# Patient Record
Sex: FEMALE | Race: WHITE | Employment: OTHER | URBAN - METROPOLITAN AREA
[De-identification: names, ages, dates, MRNs, and addresses within clinical notes are randomized per-mention and may not be internally consistent; named-entity substitution may affect disease eponyms.]

---

## 2017-02-07 ENCOUNTER — OFFICE VISIT (OUTPATIENT)
Dept: FAMILY MEDICINE CLINIC | Age: 72
End: 2017-02-07

## 2017-02-07 VITALS
RESPIRATION RATE: 12 BRPM | SYSTOLIC BLOOD PRESSURE: 108 MMHG | HEART RATE: 82 BPM | BODY MASS INDEX: 23.39 KG/M2 | DIASTOLIC BLOOD PRESSURE: 66 MMHG | HEIGHT: 64 IN | WEIGHT: 137 LBS

## 2017-02-07 DIAGNOSIS — L02.91 ABSCESS: Primary | ICD-10-CM

## 2017-02-07 PROCEDURE — 99213 OFFICE O/P EST LOW 20 MIN: CPT | Performed by: FAMILY MEDICINE

## 2017-02-07 PROCEDURE — 10060 I&D ABSCESS SIMPLE/SINGLE: CPT | Performed by: FAMILY MEDICINE

## 2017-02-07 RX ORDER — SULFAMETHOXAZOLE AND TRIMETHOPRIM 800; 160 MG/1; MG/1
1 TABLET ORAL 2 TIMES DAILY
Qty: 20 TABLET | Refills: 0 | Status: SHIPPED | OUTPATIENT
Start: 2017-02-07 | End: 2017-02-17

## 2017-03-05 DIAGNOSIS — J40 BRONCHITIS: ICD-10-CM

## 2017-03-05 DIAGNOSIS — R06.02 SOB (SHORTNESS OF BREATH): ICD-10-CM

## 2017-03-05 DIAGNOSIS — J45.901 REACTIVE AIRWAY DISEASE WITH ACUTE EXACERBATION: ICD-10-CM

## 2017-03-06 RX ORDER — DEXAMETHASONE 4 MG/1
TABLET ORAL
Qty: 12 G | Refills: 0 | Status: SHIPPED | OUTPATIENT
Start: 2017-03-06 | End: 2017-03-29 | Stop reason: SDUPTHER

## 2017-03-13 ENCOUNTER — OFFICE VISIT (OUTPATIENT)
Dept: FAMILY MEDICINE CLINIC | Age: 72
End: 2017-03-13

## 2017-03-13 ENCOUNTER — HOSPITAL ENCOUNTER (OUTPATIENT)
Dept: OTHER | Age: 72
Discharge: HOME | End: 2017-03-13
Attending: FAMILY MEDICINE | Admitting: FAMILY MEDICINE

## 2017-03-13 VITALS
HEIGHT: 65 IN | DIASTOLIC BLOOD PRESSURE: 64 MMHG | WEIGHT: 139 LBS | SYSTOLIC BLOOD PRESSURE: 134 MMHG | HEART RATE: 72 BPM | RESPIRATION RATE: 12 BRPM | BODY MASS INDEX: 23.16 KG/M2

## 2017-03-13 DIAGNOSIS — M53.3 COCCYGEAL PAIN: Primary | ICD-10-CM

## 2017-03-13 DIAGNOSIS — M53.3 COCCYGEAL PAIN: ICD-10-CM

## 2017-03-13 PROCEDURE — 99213 OFFICE O/P EST LOW 20 MIN: CPT | Performed by: FAMILY MEDICINE

## 2017-03-21 ENCOUNTER — OFFICE VISIT (OUTPATIENT)
Dept: PULMONOLOGY | Age: 72
End: 2017-03-21

## 2017-03-21 VITALS
SYSTOLIC BLOOD PRESSURE: 120 MMHG | HEART RATE: 79 BPM | DIASTOLIC BLOOD PRESSURE: 70 MMHG | HEIGHT: 64 IN | WEIGHT: 139 LBS | OXYGEN SATURATION: 98 % | TEMPERATURE: 97.4 F | BODY MASS INDEX: 23.73 KG/M2 | RESPIRATION RATE: 16 BRPM

## 2017-03-21 DIAGNOSIS — R06.02 SOB (SHORTNESS OF BREATH): ICD-10-CM

## 2017-03-21 DIAGNOSIS — J45.40 REACTIVE AIRWAY DISEASE, MODERATE PERSISTENT, UNCOMPLICATED: Primary | ICD-10-CM

## 2017-03-21 PROCEDURE — 99214 OFFICE O/P EST MOD 30 MIN: CPT | Performed by: INTERNAL MEDICINE

## 2017-03-21 RX ORDER — MONTELUKAST SODIUM 10 MG/1
10 TABLET ORAL NIGHTLY
Qty: 30 TABLET | Refills: 11 | Status: SHIPPED | OUTPATIENT
Start: 2017-03-21 | End: 2017-09-20 | Stop reason: SDUPTHER

## 2017-03-21 RX ORDER — NAPROXEN SODIUM 220 MG
220 TABLET ORAL 2 TIMES DAILY WITH MEALS
COMMUNITY
End: 2020-06-17 | Stop reason: ALTCHOICE

## 2017-03-21 RX ORDER — FLUTICASONE PROPIONATE 110 UG/1
2 AEROSOL, METERED RESPIRATORY (INHALATION) 2 TIMES DAILY
Qty: 1 INHALER | Refills: 11 | Status: SHIPPED | OUTPATIENT
Start: 2017-03-21 | End: 2018-04-09 | Stop reason: SDUPTHER

## 2017-03-21 ASSESSMENT — ASTHMA QUESTIONNAIRES
QUESTION_2 LAST FOUR WEEKS HOW OFTEN HAVE YOU HAD SHORTNESS OF BREATH: 4
QUESTION_4 LAST FOUR WEEKS HOW OFTEN HAVE YOU USED YOUR RESCUE INHALER OR NEBULIZER MEDICATION (SUCH AS ALBUTEROL): 2
QUESTION_3 LAST FOUR WEEKS HOW OFTEN DID YOUR ASTHMA SYMPTOMS (WHEEZING, COUGHING, SHORTNESS OF BREATH, CHEST TIGHTNESS OR PAIN) WAKE YOU UP AT NIGHT OR EARLIER THAN USUAL IN THE MORNING: 5
QUESTION_5 LAST FOUR WEEKS HOW WOULD YOU RATE YOUR ASTHMA CONTROL: 4
ACT_TOTALSCORE: 20
QUESTION_1 LAST FOUR WEEKS HOW MUCH OF THE TIME DID YOUR ASTHMA KEEP YOU FROM GETTING AS MUCH DONE AT WORK, SCHOOL OR AT HOME: 5

## 2017-03-28 ENCOUNTER — HOSPITAL ENCOUNTER (OUTPATIENT)
Dept: OTHER | Age: 72
Discharge: HOME | End: 2017-03-31
Attending: FAMILY MEDICINE | Admitting: FAMILY MEDICINE

## 2017-03-29 DIAGNOSIS — J45.901 REACTIVE AIRWAY DISEASE WITH ACUTE EXACERBATION: ICD-10-CM

## 2017-03-29 DIAGNOSIS — J40 BRONCHITIS: ICD-10-CM

## 2017-03-29 DIAGNOSIS — R06.02 SOB (SHORTNESS OF BREATH): ICD-10-CM

## 2017-03-30 RX ORDER — DEXAMETHASONE 4 MG/1
TABLET ORAL
Qty: 12 G | Refills: 0 | Status: SHIPPED | OUTPATIENT
Start: 2017-03-30 | End: 2017-04-25 | Stop reason: SDUPTHER

## 2017-04-25 DIAGNOSIS — R06.02 SOB (SHORTNESS OF BREATH): ICD-10-CM

## 2017-04-25 DIAGNOSIS — J40 BRONCHITIS: ICD-10-CM

## 2017-04-25 DIAGNOSIS — J45.901 REACTIVE AIRWAY DISEASE WITH ACUTE EXACERBATION: ICD-10-CM

## 2017-04-25 RX ORDER — DEXAMETHASONE 4 MG/1
TABLET ORAL
Qty: 12 G | Refills: 0 | Status: SHIPPED | OUTPATIENT
Start: 2017-04-25 | End: 2017-09-20 | Stop reason: SDUPTHER

## 2017-07-13 ENCOUNTER — HOSPITAL ENCOUNTER (OUTPATIENT)
Dept: WOMENS IMAGING | Age: 72
Discharge: HOME | End: 2017-07-13
Attending: OBSTETRICS & GYNECOLOGY | Admitting: OBSTETRICS & GYNECOLOGY

## 2017-07-13 DIAGNOSIS — Z12.31 VISIT FOR SCREENING MAMMOGRAM: ICD-10-CM

## 2017-08-22 ENCOUNTER — OFFICE VISIT (OUTPATIENT)
Dept: DERMATOLOGY | Age: 72
End: 2017-08-22

## 2017-08-22 DIAGNOSIS — D22.9 MULTIPLE NEVI: ICD-10-CM

## 2017-08-22 DIAGNOSIS — L73.9 FOLLICULITIS: ICD-10-CM

## 2017-08-22 DIAGNOSIS — L57.0 AK (ACTINIC KERATOSIS): Primary | ICD-10-CM

## 2017-08-22 PROCEDURE — 17000 DESTRUCT PREMALG LESION: CPT | Performed by: DERMATOLOGY

## 2017-08-22 PROCEDURE — 99213 OFFICE O/P EST LOW 20 MIN: CPT | Performed by: DERMATOLOGY

## 2017-08-22 RX ORDER — CLINDAMYCIN PHOSPHATE 11.9 MG/ML
SOLUTION TOPICAL
Qty: 60 ML | Refills: 3 | Status: SHIPPED | OUTPATIENT
Start: 2017-08-22 | End: 2019-10-15 | Stop reason: SDUPTHER

## 2017-09-20 ENCOUNTER — OFFICE VISIT (OUTPATIENT)
Dept: FAMILY MEDICINE CLINIC | Age: 72
End: 2017-09-20

## 2017-09-20 VITALS
BODY MASS INDEX: 23.82 KG/M2 | HEIGHT: 65 IN | WEIGHT: 143 LBS | DIASTOLIC BLOOD PRESSURE: 80 MMHG | HEART RATE: 60 BPM | RESPIRATION RATE: 12 BRPM | SYSTOLIC BLOOD PRESSURE: 120 MMHG

## 2017-09-20 DIAGNOSIS — H35.30 MACULAR DEGENERATION: ICD-10-CM

## 2017-09-20 DIAGNOSIS — J45.20 RAD (REACTIVE AIRWAY DISEASE), MILD INTERMITTENT, UNCOMPLICATED: Primary | ICD-10-CM

## 2017-09-20 DIAGNOSIS — Z23 NEED FOR INFLUENZA VACCINATION: ICD-10-CM

## 2017-09-20 DIAGNOSIS — K21.9 GASTROESOPHAGEAL REFLUX DISEASE WITHOUT ESOPHAGITIS: ICD-10-CM

## 2017-09-20 PROCEDURE — 90662 IIV NO PRSV INCREASED AG IM: CPT | Performed by: FAMILY MEDICINE

## 2017-09-20 PROCEDURE — 99214 OFFICE O/P EST MOD 30 MIN: CPT | Performed by: FAMILY MEDICINE

## 2017-09-20 PROCEDURE — G0008 ADMIN INFLUENZA VIRUS VAC: HCPCS | Performed by: FAMILY MEDICINE

## 2017-09-20 RX ORDER — RANITIDINE 150 MG/1
150 TABLET ORAL 2 TIMES DAILY
Qty: 60 TABLET | Refills: 3 | Status: SHIPPED | OUTPATIENT
Start: 2017-09-20 | End: 2020-06-17

## 2017-09-26 ENCOUNTER — OFFICE VISIT (OUTPATIENT)
Dept: PULMONOLOGY | Age: 72
End: 2017-09-26

## 2017-09-26 VITALS
HEART RATE: 81 BPM | RESPIRATION RATE: 16 BRPM | BODY MASS INDEX: 23.49 KG/M2 | HEIGHT: 65 IN | WEIGHT: 141 LBS | SYSTOLIC BLOOD PRESSURE: 122 MMHG | OXYGEN SATURATION: 96 % | DIASTOLIC BLOOD PRESSURE: 62 MMHG | TEMPERATURE: 97.7 F

## 2017-09-26 DIAGNOSIS — J45.40 REACTIVE AIRWAY DISEASE, MODERATE PERSISTENT, UNCOMPLICATED: Primary | ICD-10-CM

## 2017-09-26 PROCEDURE — 99213 OFFICE O/P EST LOW 20 MIN: CPT | Performed by: INTERNAL MEDICINE

## 2017-09-26 RX ORDER — FLUTICASONE PROPIONATE 110 UG/1
2 AEROSOL, METERED RESPIRATORY (INHALATION) 2 TIMES DAILY
Qty: 1 INHALER | Refills: 11 | Status: SHIPPED | OUTPATIENT
Start: 2017-09-26 | End: 2018-03-21 | Stop reason: SDUPTHER

## 2017-09-26 RX ORDER — ALBUTEROL SULFATE 90 UG/1
2 AEROSOL, METERED RESPIRATORY (INHALATION) EVERY 4 HOURS PRN
Qty: 1 INHALER | Refills: 11 | Status: SHIPPED | OUTPATIENT
Start: 2017-09-26 | End: 2018-10-19 | Stop reason: SDUPTHER

## 2017-09-26 RX ORDER — MONTELUKAST SODIUM 10 MG/1
10 TABLET ORAL NIGHTLY
Qty: 30 TABLET | Refills: 6 | Status: SHIPPED | OUTPATIENT
Start: 2017-09-26 | End: 2018-03-21 | Stop reason: SDUPTHER

## 2017-09-26 ASSESSMENT — ASTHMA QUESTIONNAIRES
QUESTION_3 LAST FOUR WEEKS HOW OFTEN DID YOUR ASTHMA SYMPTOMS (WHEEZING, COUGHING, SHORTNESS OF BREATH, CHEST TIGHTNESS OR PAIN) WAKE YOU UP AT NIGHT OR EARLIER THAN USUAL IN THE MORNING: 5
QUESTION_1 LAST FOUR WEEKS HOW MUCH OF THE TIME DID YOUR ASTHMA KEEP YOU FROM GETTING AS MUCH DONE AT WORK, SCHOOL OR AT HOME: 4
ACT_TOTALSCORE: 19
QUESTION_5 LAST FOUR WEEKS HOW WOULD YOU RATE YOUR ASTHMA CONTROL: 4
QUESTION_4 LAST FOUR WEEKS HOW OFTEN HAVE YOU USED YOUR RESCUE INHALER OR NEBULIZER MEDICATION (SUCH AS ALBUTEROL): 2
QUESTION_2 LAST FOUR WEEKS HOW OFTEN HAVE YOU HAD SHORTNESS OF BREATH: 4

## 2017-12-01 ENCOUNTER — TELEPHONE (OUTPATIENT)
Dept: PULMONOLOGY | Age: 72
End: 2017-12-01

## 2017-12-01 DIAGNOSIS — R05.9 COUGH: Primary | ICD-10-CM

## 2017-12-01 RX ORDER — BENZONATATE 100 MG/1
200 CAPSULE ORAL 3 TIMES DAILY PRN
Qty: 60 CAPSULE | Refills: 2 | Status: SHIPPED | OUTPATIENT
Start: 2017-12-01 | End: 2017-12-08

## 2017-12-01 NOTE — TELEPHONE ENCOUNTER
Pt called c/o productive cough clear but constant, no wheezing, no fevers, just coughing so much causing to hurt in chest area. Would like a cough med called in.

## 2018-03-21 ENCOUNTER — OFFICE VISIT (OUTPATIENT)
Dept: FAMILY MEDICINE CLINIC | Age: 73
End: 2018-03-21

## 2018-03-21 VITALS
DIASTOLIC BLOOD PRESSURE: 71 MMHG | HEART RATE: 80 BPM | WEIGHT: 139.4 LBS | HEIGHT: 65 IN | SYSTOLIC BLOOD PRESSURE: 137 MMHG | RESPIRATION RATE: 16 BRPM | BODY MASS INDEX: 23.22 KG/M2

## 2018-03-21 DIAGNOSIS — Z23 NEED FOR PROPHYLACTIC VACCINATION AND INOCULATION AGAINST VARICELLA: ICD-10-CM

## 2018-03-21 DIAGNOSIS — Z13.220 SCREENING FOR HYPERLIPIDEMIA: ICD-10-CM

## 2018-03-21 DIAGNOSIS — Z13.1 SCREENING FOR DIABETES MELLITUS: ICD-10-CM

## 2018-03-21 DIAGNOSIS — Z00.00 ROUTINE GENERAL MEDICAL EXAMINATION AT A HEALTH CARE FACILITY: Primary | ICD-10-CM

## 2018-03-21 LAB
CHOLESTEROL, TOTAL: 239 MG/DL (ref 0–199)
GLUCOSE BLD-MCNC: 92 MG/DL (ref 70–99)
HDLC SERPL-MCNC: 162 MG/DL (ref 40–60)
LDL CHOLESTEROL CALCULATED: 69 MG/DL
TRIGL SERPL-MCNC: 42 MG/DL (ref 0–150)
VLDLC SERPL CALC-MCNC: 8 MG/DL

## 2018-03-21 PROCEDURE — G0439 PPPS, SUBSEQ VISIT: HCPCS | Performed by: FAMILY MEDICINE

## 2018-03-21 PROCEDURE — 36415 COLL VENOUS BLD VENIPUNCTURE: CPT | Performed by: FAMILY MEDICINE

## 2018-03-21 ASSESSMENT — LIFESTYLE VARIABLES
AUDIT TOTAL SCORE: 1
HOW OFTEN DURING THE LAST YEAR HAVE YOU FOUND THAT YOU WERE NOT ABLE TO STOP DRINKING ONCE YOU HAD STARTED: 0
AUDIT-C TOTAL SCORE: 1
HOW OFTEN DURING THE LAST YEAR HAVE YOU FAILED TO DO WHAT WAS NORMALLY EXPECTED FROM YOU BECAUSE OF DRINKING: 0
HOW OFTEN DO YOU HAVE SIX OR MORE DRINKS ON ONE OCCASION: 0
HAS A RELATIVE, FRIEND, DOCTOR, OR ANOTHER HEALTH PROFESSIONAL EXPRESSED CONCERN ABOUT YOUR DRINKING OR SUGGESTED YOU CUT DOWN: 0
HOW OFTEN DURING THE LAST YEAR HAVE YOU BEEN UNABLE TO REMEMBER WHAT HAPPENED THE NIGHT BEFORE BECAUSE YOU HAD BEEN DRINKING: 0
HOW OFTEN DURING THE LAST YEAR HAVE YOU HAD A FEELING OF GUILT OR REMORSE AFTER DRINKING: 0
HOW OFTEN DO YOU HAVE A DRINK CONTAINING ALCOHOL: 1
HOW OFTEN DURING THE LAST YEAR HAVE YOU NEEDED AN ALCOHOLIC DRINK FIRST THING IN THE MORNING TO GET YOURSELF GOING AFTER A NIGHT OF HEAVY DRINKING: 0
HAVE YOU OR SOMEONE ELSE BEEN INJURED AS A RESULT OF YOUR DRINKING: 0
HOW MANY STANDARD DRINKS CONTAINING ALCOHOL DO YOU HAVE ON A TYPICAL DAY: 0

## 2018-03-21 ASSESSMENT — PATIENT HEALTH QUESTIONNAIRE - PHQ9
SUM OF ALL RESPONSES TO PHQ9 QUESTIONS 1 & 2: 0
SUM OF ALL RESPONSES TO PHQ QUESTIONS 1-9: 0
1. LITTLE INTEREST OR PLEASURE IN DOING THINGS: 0
SUM OF ALL RESPONSES TO PHQ QUESTIONS 1-9: 0
2. FEELING DOWN, DEPRESSED OR HOPELESS: 0

## 2018-03-21 ASSESSMENT — ANXIETY QUESTIONNAIRES: GAD7 TOTAL SCORE: 0

## 2018-03-21 NOTE — PROGRESS NOTES
Provider, MD   polyethyl glycol-propyl glycol 0.4-0.3 % (SYSTANE) 0.4-0.3 % ophthalmic solution 1 drop as needed for Dry Eyes. Yes Historical Provider, MD   Multiple Vitamins-Minerals (CENTRUM SILVER) TABS Take  by mouth daily. Yes Historical Provider, MD   cetirizine (ZYRTEC ALLERGY) 10 MG tablet Take 10 mg by mouth daily. Yes Historical Provider, MD   Calcium Carbonate-Vitamin D (CALTRATE 600+D PO) Take  by mouth. Yes Historical Provider, MD   aspirin 81 MG chewable tablet Take 81 mg by mouth daily. Yes Historical Provider, MD   ranitidine (ZANTAC) 150 MG tablet Take 1 tablet by mouth 2 times daily  Ami Villalobos MD   Spacer/Aero-Holding Chambers (E-Z SPACER) CONCEPCION 1 Device by Does not apply route daily as needed  Martine John DO   Spacer/Aero Chamber Mouthpiece MISC 1 each by Does not apply route once as needed  Magdiel Wheeler MD       Past Medical History:   Diagnosis Date    Actinic keratosis     goes to derm annually    Cataract mild    Diverticulosis     Dry eye     Hyperlipidemia     Macular degeneration     dr Shelley Lew Mitral valve prolapse     diagnosed [facility] early 1990s. asymptomatic    RAD (reactive airway disease)     treated by dr Jose López.     Seasonal allergies      Past Surgical History:   Procedure Laterality Date    APPENDECTOMY  1948    DILATION AND CURETTAGE OF UTERUS      1979    EYE SURGERY      to remove ptyergium    COCO AND BSO  1989    secondary to fibroids    TUBAL LIGATION  1979       Family History   Problem Relation Age of Onset    Cancer Mother 66     lung (was a smoker)    Heart Disease Mother     Diabetes Father     High Blood Pressure Sister     High Blood Pressure Sister     Dementia Sister      PARTIALLY    Diabetes Maternal Aunt     Diabetes Paternal Uncle        CareTeam (Including outside providers/suppliers regularly involved in providing care):   Patient Care Team:  Magdiel Wheeler MD as PCP - General ([facility]) 11/06/2015    Pneumococcal Polysaccharide ([id number]) 11/28/2011    Tdap (Boostrix, Adacel) 07/10/2006, 02/01/2009, 02/26/2009    Typhoid Vaccine, unspecified formulation 06/14/2008    Yellow Fever 08/30/2006    Zoster Live (Zostavax) 09/22/2011        Health Maintenance   Topic Date Due    Shingles Vaccine (1 of 2 - 2 Dose Series) 04/07/1994    DTaP/Tdap/Td vaccine (3 - Td) 02/26/2019    Breast cancer screen  07/13/2019    Lipid screen  05/06/2021    Colon cancer screen colonoscopy  02/08/2022    DEXA (modify frequency per FRAX score)  Completed    Flu vaccine  Completed    Pneumococcal low/med risk  Completed     Recommendations for Preventive Services Due: see orders.   Recommended screening schedule for the next 5-10 years is provided to the patient in written form: see Patient Instructions/AVS.

## 2018-03-26 ENCOUNTER — OFFICE VISIT (OUTPATIENT)
Dept: PULMONOLOGY | Age: 73
End: 2018-03-26

## 2018-03-26 VITALS
BODY MASS INDEX: 23.16 KG/M2 | SYSTOLIC BLOOD PRESSURE: 139 MMHG | OXYGEN SATURATION: 99 % | TEMPERATURE: 96.7 F | HEART RATE: 68 BPM | RESPIRATION RATE: 16 BRPM | DIASTOLIC BLOOD PRESSURE: 80 MMHG | WEIGHT: 139 LBS | HEIGHT: 65 IN

## 2018-03-26 DIAGNOSIS — J45.40 REACTIVE AIRWAY DISEASE, MODERATE PERSISTENT, UNCOMPLICATED: Primary | ICD-10-CM

## 2018-03-26 PROCEDURE — 99213 OFFICE O/P EST LOW 20 MIN: CPT | Performed by: INTERNAL MEDICINE

## 2018-03-26 ASSESSMENT — ASTHMA QUESTIONNAIRES
QUESTION_1 LAST FOUR WEEKS HOW MUCH OF THE TIME DID YOUR ASTHMA KEEP YOU FROM GETTING AS MUCH DONE AT WORK, SCHOOL OR AT HOME: 5
QUESTION_5 LAST FOUR WEEKS HOW WOULD YOU RATE YOUR ASTHMA CONTROL: 5
QUESTION_4 LAST FOUR WEEKS HOW OFTEN HAVE YOU USED YOUR RESCUE INHALER OR NEBULIZER MEDICATION (SUCH AS ALBUTEROL): 2
QUESTION_3 LAST FOUR WEEKS HOW OFTEN DID YOUR ASTHMA SYMPTOMS (WHEEZING, COUGHING, SHORTNESS OF BREATH, CHEST TIGHTNESS OR PAIN) WAKE YOU UP AT NIGHT OR EARLIER THAN USUAL IN THE MORNING: 5
QUESTION_2 LAST FOUR WEEKS HOW OFTEN HAVE YOU HAD SHORTNESS OF BREATH: 4
ACT_TOTALSCORE: 21

## 2018-03-26 NOTE — PROGRESS NOTES
Chief complaint  This is a 67y.o. year old female  who comes to see me with a chief complaint of   Chief Complaint   Patient presents with    Asthma       HPI  Here with cc on follow up on asthma/RADs. Doing well. A family member brought up the possibility she could have CAD when he saw her gasping for air the other day. Boyd Hammer became worried but she felt this was normal for her and normal for her asthma. Still uses albuterol on prn and prior to exercise and uses flovent. No other changes. Has enough refills    Past Medical History:   Diagnosis Date    Actinic keratosis     goes to derm annually    Cataract mild    Diverticulosis     Dry eye     Hyperlipidemia     Macular degeneration     dr Madeline Virk Mitral valve prolapse     diagnosed [facility] early 1990s. asymptomatic    RAD (reactive airway disease)     treated by dr Micah Watkins.     Seasonal allergies        Past Surgical History:   Procedure Laterality Date    APPENDECTOMY  1948    DILATION AND CURETTAGE OF UTERUS      1979    EYE SURGERY      to remove ptyergium    COCO AND BSO  1989    secondary to fibroids    TUBAL LIGATION  1979       Current Outpatient Prescriptions   Medication Sig Dispense Refill    albuterol sulfate  (90 Base) MCG/ACT inhaler Inhale 2 puffs into the lungs every 4 hours as needed for Wheezing or Shortness of Breath (or cough) 1 Inhaler 11    ranitidine (ZANTAC) 150 MG tablet Take 1 tablet by mouth 2 times daily 60 tablet 3    clindamycin (CLEOCIN T) 1 % external solution Apply to affected area BID prn flares [id number], pt prefers this one 60 mL 3    VENTOLIN  (90 BASE) MCG/ACT inhaler INHALE 2 PUFFS BY MOUTH EVERY 4 HOURS AS NEEDED FOR WHEEZING 18 g 0    naproxen sodium (ANAPROX) 220 MG tablet Take 220 mg by mouth 2 times daily (with meals)      montelukast (SINGULAIR) 10 MG tablet TAKE 1 TABLET BY MOUTH EVERY EVENING 30 tablet 4    Psyllium (METAMUCIL PO) Take by mouth     

## 2018-04-09 DIAGNOSIS — J45.40 REACTIVE AIRWAY DISEASE, MODERATE PERSISTENT, UNCOMPLICATED: ICD-10-CM

## 2018-04-09 RX ORDER — DEXAMETHASONE 4 MG/1
TABLET ORAL
Qty: 1 INHALER | Refills: 0 | Status: SHIPPED | OUTPATIENT
Start: 2018-04-09 | End: 2018-05-01 | Stop reason: SDUPTHER

## 2018-04-17 DIAGNOSIS — J45.40 REACTIVE AIRWAY DISEASE, MODERATE PERSISTENT, UNCOMPLICATED: ICD-10-CM

## 2018-04-17 RX ORDER — MONTELUKAST SODIUM 10 MG/1
TABLET ORAL
Qty: 30 TABLET | Refills: 5 | Status: SHIPPED | OUTPATIENT
Start: 2018-04-17 | End: 2018-05-19 | Stop reason: SDUPTHER

## 2018-05-01 DIAGNOSIS — J45.40 REACTIVE AIRWAY DISEASE, MODERATE PERSISTENT, UNCOMPLICATED: ICD-10-CM

## 2018-05-01 DIAGNOSIS — J45.909 REACTIVE AIRWAY DISEASE WITHOUT COMPLICATION, UNSPECIFIED ASTHMA SEVERITY, UNSPECIFIED WHETHER PERSISTENT: ICD-10-CM

## 2018-05-01 RX ORDER — DEXAMETHASONE 4 MG/1
TABLET ORAL
Qty: 12 G | Refills: 0 | Status: SHIPPED | OUTPATIENT
Start: 2018-05-01 | End: 2020-01-30 | Stop reason: SDUPTHER

## 2018-05-19 DIAGNOSIS — J45.40 REACTIVE AIRWAY DISEASE, MODERATE PERSISTENT, UNCOMPLICATED: ICD-10-CM

## 2018-05-21 RX ORDER — MONTELUKAST SODIUM 10 MG/1
10 TABLET ORAL NIGHTLY
Qty: 90 TABLET | Refills: 5 | Status: SHIPPED | OUTPATIENT
Start: 2018-05-21 | End: 2019-03-26 | Stop reason: SDUPTHER

## 2018-07-17 ENCOUNTER — HOSPITAL ENCOUNTER (OUTPATIENT)
Dept: WOMENS IMAGING | Age: 73
Discharge: HOME | End: 2018-07-17
Attending: OBSTETRICS & GYNECOLOGY | Admitting: OBSTETRICS & GYNECOLOGY

## 2018-07-17 DIAGNOSIS — Z12.39 BREAST CANCER SCREENING: ICD-10-CM

## 2018-08-09 ENCOUNTER — TELEPHONE (OUTPATIENT)
Dept: FAMILY MEDICINE CLINIC | Age: 73
End: 2018-08-09

## 2018-08-09 NOTE — TELEPHONE ENCOUNTER
Please call patient back, and let her know she should get vaccine at [facility]. Please document call and then close encounter.   thanks

## 2018-09-25 ENCOUNTER — TELEPHONE (OUTPATIENT)
Dept: FAMILY MEDICINE CLINIC | Age: 73
End: 2018-09-25

## 2018-09-25 ENCOUNTER — OFFICE VISIT (OUTPATIENT)
Dept: PULMONOLOGY | Age: 73
End: 2018-09-25
Payer: COMMERCIAL

## 2018-09-25 VITALS
SYSTOLIC BLOOD PRESSURE: 138 MMHG | RESPIRATION RATE: 16 BRPM | HEIGHT: 65 IN | DIASTOLIC BLOOD PRESSURE: 79 MMHG | OXYGEN SATURATION: 100 % | HEART RATE: 72 BPM | WEIGHT: 141 LBS | TEMPERATURE: 97.5 F | BODY MASS INDEX: 23.49 KG/M2

## 2018-09-25 DIAGNOSIS — R06.09 DOE (DYSPNEA ON EXERTION): Primary | ICD-10-CM

## 2018-09-25 DIAGNOSIS — J45.40 REACTIVE AIRWAY DISEASE, MODERATE PERSISTENT, UNCOMPLICATED: ICD-10-CM

## 2018-09-25 PROCEDURE — 99214 OFFICE O/P EST MOD 30 MIN: CPT | Performed by: INTERNAL MEDICINE

## 2018-09-25 ASSESSMENT — ASTHMA QUESTIONNAIRES
QUESTION_1 LAST FOUR WEEKS HOW MUCH OF THE TIME DID YOUR ASTHMA KEEP YOU FROM GETTING AS MUCH DONE AT WORK, SCHOOL OR AT HOME: 3
QUESTION_5 LAST FOUR WEEKS HOW WOULD YOU RATE YOUR ASTHMA CONTROL: 4
QUESTION_3 LAST FOUR WEEKS HOW OFTEN DID YOUR ASTHMA SYMPTOMS (WHEEZING, COUGHING, SHORTNESS OF BREATH, CHEST TIGHTNESS OR PAIN) WAKE YOU UP AT NIGHT OR EARLIER THAN USUAL IN THE MORNING: 5
QUESTION_4 LAST FOUR WEEKS HOW OFTEN HAVE YOU USED YOUR RESCUE INHALER OR NEBULIZER MEDICATION (SUCH AS ALBUTEROL): 2
QUESTION_2 LAST FOUR WEEKS HOW OFTEN HAVE YOU HAD SHORTNESS OF BREATH: 3
ACT_TOTALSCORE: 17

## 2018-09-25 NOTE — Clinical Note
Dr. Arleth Bello was in today. She more profoundly SOB with any exertion and it seems out of proportion to her lung disease.   I told her to call you about cardiac stress test.  If that is negative she will need cardio-pulmonary stress test at [facility] (I would order that)  Thanks Sacha Zambrano

## 2018-09-25 NOTE — PROGRESS NOTES
Chief complaint  This is a 75y.o. year old female  who comes to see me with a chief complaint of   Chief Complaint   Patient presents with    Asthma       HPI  Here with cc on follow up on asthma/RADs. Not doing well. She is profoundly SOB with any exertional activities. She went to [facility] and had trouble going up any flights of stairs without having to stop and catch her breath. She would stop, improve and then continue on again. She has been using flovent, albuterol and singulair. Did admit to the heat affecting her but did not seem to feel that albuterol would help these events. She was also noticing severe issues breathing when pulling her luggage. There were a lot of nurses with her on the trip and everyone had an opinion about what was wrong    Past Medical History:   Diagnosis Date    Actinic keratosis     goes to derm annually    Cataract mild    Diverticulosis     Dry eye     Hyperlipidemia     Macular degeneration     dr Rachael Abernathy Mitral valve prolapse     diagnosed [facility] early 1990s. asymptomatic    RAD (reactive airway disease)     treated by dr Blake Ferguson.     Seasonal allergies        Past Surgical History:   Procedure Laterality Date    APPENDECTOMY  1948    DILATION AND CURETTAGE OF UTERUS      1979    EYE SURGERY      to remove ptyergium    COCO AND BSO  1989    secondary to fibroids    TUBAL LIGATION  1979       Current Outpatient Prescriptions   Medication Sig Dispense Refill    montelukast (SINGULAIR) 10 MG tablet Take 1 tablet by mouth nightly 90 tablet 5    FLOVENT  MCG/ACT inhaler INHALE 2 PUFFS BY MOUTH TWICE DAILY 12 g 0    albuterol sulfate  (90 Base) MCG/ACT inhaler Inhale 2 puffs into the lungs every 4 hours as needed for Wheezing or Shortness of Breath (or cough) 1 Inhaler 11    ranitidine (ZANTAC) 150 MG tablet Take 1 tablet by mouth 2 times daily 60 tablet 3    clindamycin (CLEOCIN T) 1 % external solution Apply to affected area BID

## 2018-09-25 NOTE — TELEPHONE ENCOUNTER
Inform patient that Dr. Liudmila Uribe does not believe her shortness of breath is due to her lungs. He would like us to get a stress test.  I placed an order. Given number to patient to schedule. Please document call and then close encounter.   thanks

## 2018-10-01 ENCOUNTER — HOSPITAL ENCOUNTER (OUTPATIENT)
Dept: NON INVASIVE DIAGNOSTICS | Age: 73
Discharge: HOME | End: 2018-10-01
Payer: COMMERCIAL

## 2018-10-01 ENCOUNTER — TELEPHONE (OUTPATIENT)
Dept: PULMONOLOGY | Age: 73
End: 2018-10-01

## 2018-10-01 ENCOUNTER — TELEPHONE (OUTPATIENT)
Dept: FAMILY MEDICINE CLINIC | Age: 73
End: 2018-10-01

## 2018-10-01 DIAGNOSIS — R06.09 DOE (DYSPNEA ON EXERTION): ICD-10-CM

## 2018-10-01 LAB
LV EF: 79 %
LVEF MODALITY: NORMAL

## 2018-10-01 PROCEDURE — 78452 HT MUSCLE IMAGE SPECT MULT: CPT

## 2018-10-01 PROCEDURE — 3430000000 HC RX DIAGNOSTIC RADIOPHARMACEUTICAL: Performed by: FAMILY MEDICINE

## 2018-10-01 PROCEDURE — 6360000002 HC RX W HCPCS: Performed by: FAMILY MEDICINE

## 2018-10-01 PROCEDURE — A9502 TC99M TETROFOSMIN: HCPCS | Performed by: FAMILY MEDICINE

## 2018-10-01 PROCEDURE — 93017 CV STRESS TEST TRACING ONLY: CPT

## 2018-10-01 RX ADMIN — REGADENOSON 0.4 MG: 0.08 INJECTION, SOLUTION INTRAVENOUS at 10:53

## 2018-10-01 RX ADMIN — TETROFOSMIN 30 MILLICURIE: 0.23 INJECTION, POWDER, LYOPHILIZED, FOR SOLUTION INTRAVENOUS at 10:50

## 2018-10-01 RX ADMIN — TETROFOSMIN 10 MILLICURIE: 0.23 INJECTION, POWDER, LYOPHILIZED, FOR SOLUTION INTRAVENOUS at 09:35

## 2018-10-02 NOTE — TELEPHONE ENCOUNTER
I need to send her to [facility] for Cardiopulmonary testing . Can you get me a script and place on my desk so I can sign it and we can fax it over?   Thanks

## 2018-10-05 DIAGNOSIS — J45.40 REACTIVE AIRWAY DISEASE, MODERATE PERSISTENT, UNCOMPLICATED: ICD-10-CM

## 2018-10-05 RX ORDER — DEXAMETHASONE 4 MG/1
TABLET ORAL
Qty: 12 G | Refills: 3 | Status: SHIPPED | OUTPATIENT
Start: 2018-10-05 | End: 2019-02-01 | Stop reason: SDUPTHER

## 2018-10-11 ENCOUNTER — TELEPHONE (OUTPATIENT)
Dept: PULMONOLOGY | Age: 73
End: 2018-10-11

## 2018-10-11 ENCOUNTER — OFFICE VISIT (OUTPATIENT)
Dept: DERMATOLOGY | Age: 73
End: 2018-10-11
Payer: COMMERCIAL

## 2018-10-11 DIAGNOSIS — D48.5 NEOPLASM OF UNCERTAIN BEHAVIOR OF SKIN: ICD-10-CM

## 2018-10-11 DIAGNOSIS — L57.0 AK (ACTINIC KERATOSIS): ICD-10-CM

## 2018-10-11 DIAGNOSIS — D22.9 MULTIPLE NEVI: Primary | ICD-10-CM

## 2018-10-11 DIAGNOSIS — L73.9 FOLLICULITIS: ICD-10-CM

## 2018-10-11 PROCEDURE — 99213 OFFICE O/P EST LOW 20 MIN: CPT | Performed by: DERMATOLOGY

## 2018-10-11 PROCEDURE — 17000 DESTRUCT PREMALG LESION: CPT | Performed by: DERMATOLOGY

## 2018-10-11 PROCEDURE — 17003 DESTRUCT PREMALG LES 2-14: CPT | Performed by: DERMATOLOGY

## 2018-10-11 PROCEDURE — 11100 PR BIOPSY OF SKIN LESION: CPT | Performed by: DERMATOLOGY

## 2018-10-11 NOTE — PROGRESS NOTES
[facility]  Tyler Patiño MD  [phone]      Ro Hammond  1945    75 y.o. female     Date of Visit: 10/11/2018    Chief Complaint: moles, f/u AK's  Chief Complaint   Patient presents with    Skin Exam     TBSE  No new concerns     Last seen: 8-2017  she previously had seen Dr. Patricia Lyn and Dr. Asad Carrillo at [facility]    History of Present Illness:    1. Here for evaluation of multiple asx pigmented lesions on the trunk and extremities, present for many years; no change in size/shape/color of any lesions; no bleeding lesions. 2. She has a history of AK's. She has a new rough lesion on the FH. Asx. She has had previous lesions treated with efudex and cryotherapy. 3. F/u scalp folliculitis  - intermittent itchy lesions on the scalp (going on for several years) -Theramycin (erythromycin) helped some in the past and she has used topical clinda more recently. No triggers noted. Well controlled with use of clinda. 4. She has a persistent pink lesion on the L nasal tip x several mos. Asx. Review of Systems:  Gen: Feels well, good sense of health. Skin: No changing moles or lesions. Past Medical History, Family History, Surgical History, Medications and Allergies reviewed. Past Medical History:   Diagnosis Date    Actinic keratosis     goes to derm annually    Cataract mild    Diverticulosis     Dry eye     Hyperlipidemia     Macular degeneration     dr Rick Maravilla Mitral valve prolapse     diagnosed [facility] early 1990s. asymptomatic    RAD (reactive airway disease)     treated by dr Neisha Mari.     Seasonal allergies        Past Surgical History:   Procedure Laterality Date    APPENDECTOMY  1948    DILATION AND CURETTAGE OF UTERUS      1979    EYE SURGERY      to remove ptyergium    COCO AND BSO  1989    secondary to fibroids    TUBAL LIGATION  1979       Outpatient Prescriptions Marked as Taking for the 10/11/18 encounter (Office Visit) with Josie Norton

## 2018-10-15 LAB — DERMATOLOGY PATHOLOGY REPORT: NORMAL

## 2018-10-19 DIAGNOSIS — J45.40 REACTIVE AIRWAY DISEASE, MODERATE PERSISTENT, UNCOMPLICATED: ICD-10-CM

## 2018-11-20 DIAGNOSIS — J45.40 REACTIVE AIRWAY DISEASE, MODERATE PERSISTENT, UNCOMPLICATED: ICD-10-CM

## 2018-11-20 RX ORDER — MONTELUKAST SODIUM 10 MG/1
TABLET ORAL
Qty: 90 TABLET | Refills: 5 | Status: SHIPPED | OUTPATIENT
Start: 2018-11-20 | End: 2020-02-03

## 2019-02-01 DIAGNOSIS — J45.40 REACTIVE AIRWAY DISEASE, MODERATE PERSISTENT, UNCOMPLICATED: ICD-10-CM

## 2019-02-04 RX ORDER — DEXAMETHASONE 4 MG/1
TABLET ORAL
Qty: 1 INHALER | Refills: 3 | Status: SHIPPED | OUTPATIENT
Start: 2019-02-04 | End: 2019-06-05 | Stop reason: SDUPTHER

## 2019-03-26 ENCOUNTER — OFFICE VISIT (OUTPATIENT)
Dept: PULMONOLOGY | Age: 74
End: 2019-03-26
Payer: COMMERCIAL

## 2019-03-26 VITALS
HEART RATE: 71 BPM | BODY MASS INDEX: 23.82 KG/M2 | HEIGHT: 65 IN | OXYGEN SATURATION: 100 % | TEMPERATURE: 97 F | SYSTOLIC BLOOD PRESSURE: 124 MMHG | DIASTOLIC BLOOD PRESSURE: 79 MMHG | WEIGHT: 143 LBS | RESPIRATION RATE: 16 BRPM

## 2019-03-26 DIAGNOSIS — J45.40 REACTIVE AIRWAY DISEASE, MODERATE PERSISTENT, UNCOMPLICATED: ICD-10-CM

## 2019-03-26 DIAGNOSIS — R06.09 DOE (DYSPNEA ON EXERTION): Primary | ICD-10-CM

## 2019-03-26 PROCEDURE — 99213 OFFICE O/P EST LOW 20 MIN: CPT | Performed by: INTERNAL MEDICINE

## 2019-03-26 ASSESSMENT — ASTHMA QUESTIONNAIRES
QUESTION_4 LAST FOUR WEEKS HOW OFTEN HAVE YOU USED YOUR RESCUE INHALER OR NEBULIZER MEDICATION (SUCH AS ALBUTEROL): 5
QUESTION_2 LAST FOUR WEEKS HOW OFTEN HAVE YOU HAD SHORTNESS OF BREATH: 5
ACT_TOTALSCORE: 25
QUESTION_3 LAST FOUR WEEKS HOW OFTEN DID YOUR ASTHMA SYMPTOMS (WHEEZING, COUGHING, SHORTNESS OF BREATH, CHEST TIGHTNESS OR PAIN) WAKE YOU UP AT NIGHT OR EARLIER THAN USUAL IN THE MORNING: 5
QUESTION_5 LAST FOUR WEEKS HOW WOULD YOU RATE YOUR ASTHMA CONTROL: 5
QUESTION_1 LAST FOUR WEEKS HOW MUCH OF THE TIME DID YOUR ASTHMA KEEP YOU FROM GETTING AS MUCH DONE AT WORK, SCHOOL OR AT HOME: 5

## 2019-04-01 ENCOUNTER — OFFICE VISIT (OUTPATIENT)
Dept: FAMILY MEDICINE CLINIC | Age: 74
End: 2019-04-01
Payer: COMMERCIAL

## 2019-04-01 VITALS
BODY MASS INDEX: 23.66 KG/M2 | SYSTOLIC BLOOD PRESSURE: 119 MMHG | WEIGHT: 142 LBS | DIASTOLIC BLOOD PRESSURE: 70 MMHG | HEART RATE: 83 BPM | HEIGHT: 65 IN

## 2019-04-01 DIAGNOSIS — Z00.00 ROUTINE GENERAL MEDICAL EXAMINATION AT A HEALTH CARE FACILITY: Primary | ICD-10-CM

## 2019-04-01 PROCEDURE — G0439 PPPS, SUBSEQ VISIT: HCPCS | Performed by: FAMILY MEDICINE

## 2019-04-01 ASSESSMENT — ANXIETY QUESTIONNAIRES: GAD7 TOTAL SCORE: 6

## 2019-04-01 ASSESSMENT — LIFESTYLE VARIABLES
HOW OFTEN DURING THE LAST YEAR HAVE YOU HAD A FEELING OF GUILT OR REMORSE AFTER DRINKING: 0
HAVE YOU OR SOMEONE ELSE BEEN INJURED AS A RESULT OF YOUR DRINKING: 0
HOW OFTEN DURING THE LAST YEAR HAVE YOU NEEDED AN ALCOHOLIC DRINK FIRST THING IN THE MORNING TO GET YOURSELF GOING AFTER A NIGHT OF HEAVY DRINKING: 0
AUDIT TOTAL SCORE: 1
HOW MANY STANDARD DRINKS CONTAINING ALCOHOL DO YOU HAVE ON A TYPICAL DAY: 0
AUDIT-C TOTAL SCORE: 1
HOW OFTEN DURING THE LAST YEAR HAVE YOU BEEN UNABLE TO REMEMBER WHAT HAPPENED THE NIGHT BEFORE BECAUSE YOU HAD BEEN DRINKING: 0
HOW OFTEN DURING THE LAST YEAR HAVE YOU FOUND THAT YOU WERE NOT ABLE TO STOP DRINKING ONCE YOU HAD STARTED: 0
HOW OFTEN DURING THE LAST YEAR HAVE YOU FAILED TO DO WHAT WAS NORMALLY EXPECTED FROM YOU BECAUSE OF DRINKING: 0
HAS A RELATIVE, FRIEND, DOCTOR, OR ANOTHER HEALTH PROFESSIONAL EXPRESSED CONCERN ABOUT YOUR DRINKING OR SUGGESTED YOU CUT DOWN: 0
HOW OFTEN DO YOU HAVE A DRINK CONTAINING ALCOHOL: 1
HOW OFTEN DO YOU HAVE SIX OR MORE DRINKS ON ONE OCCASION: 0

## 2019-04-01 ASSESSMENT — PATIENT HEALTH QUESTIONNAIRE - PHQ9
SUM OF ALL RESPONSES TO PHQ QUESTIONS 1-9: 0
SUM OF ALL RESPONSES TO PHQ QUESTIONS 1-9: 0

## 2019-04-01 NOTE — PROGRESS NOTES
Medicare Annual Wellness Visit  Name: Roslyn Vazquez Date: 2019   MRN: [id number] Sex: Female   Age: 75 y.o. Ethnicity: Non-/Non    : 1945 Race: Nicko Bush is here for Medicare AWV    Screenings for behavioral, psychosocial and functional/safety risks, and cognitive dysfunction are all negative except as indicated below. These results, as well as other patient data from the [address] [facility] form, are documented in Flowsheets linked to this Encounter. No Known Allergies  Prior to Visit Medications    Medication Sig Taking?  Authorizing Provider   [address] [facility]  MCG/ACT inhaler INHALE 2 PUFFS BY MOUTH INTO THE LUNGS TWICE DAILY Yes Genene Riedel, DO   montelukast (SINGULAIR) 10 MG tablet TAKE 1 TABLET BY MOUTH EVERY NIGHT Yes Genene Riedel, DO   FLOVENT  MCG/ACT inhaler INHALE 2 PUFFS BY MOUTH TWICE DAILY Yes Genene Riedel, DO   Spacer/Aero Chamber Mouthpiece [facility] 1 each by Does not apply route once as needed (sob) Yes Genene Riedel, DO   ranitidine (ZANTAC) 150 MG tablet Take 1 tablet by mouth 2 times daily Yes Ami Craven MD   clindamycin (CLEOCIN T) 1 % external solution Apply to affected area BID prn flares [id number], pt prefers this one Yes Mike Nettles MD   VENTOLIN  (90 BASE) MCG/ACT inhaler INHALE 2 PUFFS BY MOUTH EVERY 4 HOURS AS NEEDED FOR WHEEZING Yes Genene Riedel, DO   naproxen sodium (ANAPROX) 220 MG tablet Take 220 mg by mouth 2 times daily (with meals) Yes Historical Provider, MD   Psyllium (METAMUCIL PO) Take by mouth Yes Historical Provider, MD   Spacer/Aero-Holding Chambers (E-Z SPACER) CONCEPCION 1 Device by Does not apply route daily as needed Yes Genene Riedel, DO   Omega-3 Fatty Acids (FISH OIL PO) Take by mouth Yes Historical Provider, MD   Multiple Vitamins-Minerals (PRESERVISION AREDS 2) CAPS Take by mouth Yes Historical Provider, MD   Vitamin D (CHOLECALCIFEROL) 1000 UNITS CAPS capsule Take 1,000 Units by mouth daily Yes Historical Provider, MD   polyethyl glycol-propyl glycol 0.4-0.3 % (SYSTANE) 0.4-0.3 % ophthalmic solution 1 drop as needed for Dry Eyes. Yes Historical Provider, MD   Multiple Vitamins-Minerals (CENTRUM SILVER) TABS Take  by mouth daily. Yes Historical Provider, MD   cetirizine (ZYRTEC ALLERGY) 10 MG tablet Take 10 mg by mouth daily. Yes Historical Provider, MD   Calcium Carbonate-Vitamin D (CALTRATE 600+D PO) Take  by mouth. Yes Historical Provider, MD   aspirin 81 MG chewable tablet Take 81 mg by mouth daily. Yes Historical Provider, MD     Past Medical History:   Diagnosis Date    Actinic keratosis     goes to derm annually    Cataract mild    Diverticulosis     Dry eye     Hyperlipidemia     Macular degeneration     dr Reid Rosas Mitral valve prolapse     diagnosed [facility] early 1990s. asymptomatic    RAD (reactive airway disease)     treated by dr Angle Quintero.     Seasonal allergies      Past Surgical History:   Procedure Laterality Date    APPENDECTOMY  1948    DILATION AND CURETTAGE OF UTERUS      1979    EYE SURGERY      to remove ptyergium    COCO AND BSO  1989    secondary to fibroids    TUBAL LIGATION  1979     Family History   Problem Relation Age of Onset    Cancer Mother 66        lung (was a smoker)    Heart Disease Mother     Diabetes Father     High Blood Pressure Sister     High Blood Pressure Sister     Dementia Sister         PARTIALLY    Diabetes Maternal Aunt     Diabetes Paternal Uncle        CareTeam (Including outside providers/suppliers regularly involved in providing care):   Patient Care Team:  Jose Armando Faith MD as PCP - General (Family Medicine)  Jose Armando Faith MD as PCP - S Attributed Provider  Eduardo Asher MD as Consulting Physician (Obstetrics & Gynecology)  Namrata Baker MD as Consulting Physician (Ophthalmology)  Corinna Centeno MD as

## 2019-04-01 NOTE — PATIENT INSTRUCTIONS
Personalized Preventive Plan for [facility] - 4/1/2019  Medicare offers a range of preventive health benefits. Some of the tests and screenings are paid in full while other may be subject to a deductible, co-insurance, and/or copay. Some of these benefits include a comprehensive review of your medical history including lifestyle, illnesses that may run in your family, and various assessments and screenings as appropriate. After reviewing your medical record and screening and assessments performed today your provider may have ordered immunizations, labs, imaging, and/or referrals for you. A list of these orders (if applicable) as well as your Preventive Care list are included within your After Visit Summary for your review. Other Preventive Recommendations:    · A preventive eye exam performed by an eye specialist is recommended every 1-2 years to screen for glaucoma; cataracts, macular degeneration, and other eye disorders. · A preventive dental visit is recommended every 6 months. · Try to get at least 150 minutes of exercise per week or 10,000 steps per day on a pedometer . · Order or download the FREE \"Exercise & Physical Activity: Your Everyday Guide\" from [employer]. Call 7-870.311.6869 or search [employer] online. · You need 1956-6875 mg of calcium and 5168-2281 IU of vitamin D per day. It is possible to meet your calcium requirement with diet alone, but a vitamin D supplement is usually necessary to meet this goal.  · When exposed to the sun, use a sunscreen that protects against both UVA and UVB radiation with an SPF of 30 or greater. Reapply every 2 to 3 hours or after sweating, drying off with a towel, or swimming. · Always wear a seat belt when traveling in a car. Always wear a helmet when riding a bicycle or motorcycle.

## 2019-06-05 ENCOUNTER — OFFICE VISIT (OUTPATIENT)
Dept: FAMILY MEDICINE CLINIC | Age: 74
End: 2019-06-05
Payer: COMMERCIAL

## 2019-06-05 VITALS
SYSTOLIC BLOOD PRESSURE: 123 MMHG | HEIGHT: 65 IN | BODY MASS INDEX: 23.59 KG/M2 | OXYGEN SATURATION: 96 % | HEART RATE: 71 BPM | TEMPERATURE: 97.6 F | DIASTOLIC BLOOD PRESSURE: 72 MMHG | RESPIRATION RATE: 18 BRPM | WEIGHT: 141.6 LBS

## 2019-06-05 DIAGNOSIS — J02.9 SORE THROAT: Primary | ICD-10-CM

## 2019-06-05 DIAGNOSIS — R49.0 HOARSENESS: ICD-10-CM

## 2019-06-05 PROCEDURE — 99213 OFFICE O/P EST LOW 20 MIN: CPT | Performed by: FAMILY MEDICINE

## 2019-06-05 NOTE — PROGRESS NOTES
PROGRESS NOTE     Froylan Sapp MD  [facility] [address] [place] [address]  [phone] office  [phone] fax    Date of Service:  6/5/2019    Subjective:      Patient ID: Susan Doyle is a 75 y.o. female      CC: sore throat, hoarsness    HPI    78-year-old white female here with 2 weeks of pain in her throat, pills getting stuck in her upper throat with swallowing, and intermittent hoarseness. She states talking and swallowing does not cause her throat hurt. She denies any rhinitis, nasal congestion, postnasal drip, cough, fever, chest pain, or shortness of breath. She is on Flovent for reactive airway disease, and is concerned that she may have thrush. She does rinse her mouth out after using Flovent every time. Vitals:    06/05/19 1258   BP: 123/72   Pulse: 71   Resp: 18   Temp: 97.6 °F (36.4 °C)   SpO2: 96%   Weight: 141 lb 9.6 oz (64.2 kg)   Height: 5' 4.5\" (1.638 m)       Outpatient Medications Marked as Taking for the 6/5/19 encounter (Office Visit) with Natacha Lisa MD   Medication Sig Dispense Refill    montelukast (SINGULAIR) 10 MG tablet TAKE 1 TABLET BY MOUTH EVERY NIGHT 90 tablet 5    FLOVENT  MCG/ACT inhaler INHALE 2 PUFFS BY MOUTH TWICE DAILY 12 g 0    ranitidine (ZANTAC) 150 MG tablet Take 1 tablet by mouth 2 times daily 60 tablet 3    naproxen sodium (ANAPROX) 220 MG tablet Take 220 mg by mouth 2 times daily (with meals)      Psyllium (METAMUCIL PO) Take by mouth      Spacer/Aero-Holding Chambers (E-Z SPACER) CONCEPCION 1 Device by Does not apply route daily as needed 1 Device 0    Omega-3 Fatty Acids (FISH OIL PO) Take by mouth      Multiple Vitamins-Minerals (PRESERVISION AREDS 2) CAPS Take by mouth      Vitamin D (CHOLECALCIFEROL) 1000 UNITS CAPS capsule Take 1,000 Units by mouth daily      polyethyl glycol-propyl glycol 0.4-0.3 % (SYSTANE) 0.4-0.3 % ophthalmic solution 1 drop as needed for Dry Eyes.       Multiple Vitamins-Minerals (CENTRUM SILVER) TABS Take  by mouth daily.  cetirizine (ZYRTEC ALLERGY) 10 MG tablet Take 10 mg by mouth daily.  Calcium Carbonate-Vitamin D (CALTRATE 600+D PO) Take  by mouth.  aspirin 81 MG chewable tablet Take 81 mg by mouth daily. Past Medical History:   Diagnosis Date    Actinic keratosis     goes to derm annually    Cataract mild    Diverticulosis     Dry eye     Hyperlipidemia     Macular degeneration     dr Nancy Sidhu Mitral valve prolapse     diagnosed [facility] early 1990s. asymptomatic    RAD (reactive airway disease)     treated by dr Blanche John.  Seasonal allergies        Past Surgical History:   Procedure Laterality Date    APPENDECTOMY  1948    DILATION AND CURETTAGE OF UTERUS      1979    EYE SURGERY      to remove ptyergium    COCO AND BSO  1989    secondary to fibroids    TUBAL LIGATION  1979       Social History     Tobacco Use    Smoking status: Never Smoker    Smokeless tobacco: Never Used   Substance Use Topics    Alcohol use:  Yes     Alcohol/week: 0.0 oz     Comment: rarely       Family History   Problem Relation Age of Onset    Cancer Mother 66        lung (was a smoker)    Heart Disease Mother     Diabetes Father     High Blood Pressure Sister     High Blood Pressure Sister     Dementia Sister         PARTIALLY    Diabetes Maternal Aunt     Diabetes Paternal Uncle            Review of Systems  See hpi      Objective:   Constitutional:   · Reviewed vitals above  · Well Nourished, well developed, no distress       HENT:  · Normal external nose without lesions  · Bilateral TMs translucent with normal light reflex and bony landmarks  · Normal oropharynx without erythema or exudate  · Normal nasal mucosa without swelling or erythema  · No pain with palpation of sinuses  Neck:  · Symmetric and without masses  · No thyromegaly  Resp:  · Normal effort  · Clear to auscultation bilaterally without rhonchi, wheezing or

## 2019-06-10 DIAGNOSIS — J45.40 REACTIVE AIRWAY DISEASE, MODERATE PERSISTENT, UNCOMPLICATED: ICD-10-CM

## 2019-06-10 RX ORDER — FLUTICASONE PROPIONATE 110 UG/1
2 AEROSOL, METERED RESPIRATORY (INHALATION) 2 TIMES DAILY
Qty: 1 INHALER | Refills: 5 | Status: SHIPPED | OUTPATIENT
Start: 2019-06-10 | End: 2019-12-05 | Stop reason: SDUPTHER

## 2019-07-18 ENCOUNTER — HOSPITAL ENCOUNTER (OUTPATIENT)
Dept: WOMENS IMAGING | Age: 74
Discharge: HOME | End: 2019-07-18
Payer: COMMERCIAL

## 2019-07-18 DIAGNOSIS — Z12.31 VISIT FOR SCREENING MAMMOGRAM: ICD-10-CM

## 2019-07-18 PROCEDURE — 77063 BREAST TOMOSYNTHESIS BI: CPT

## 2019-09-24 ENCOUNTER — OFFICE VISIT (OUTPATIENT)
Dept: PULMONOLOGY | Age: 74
End: 2019-09-24
Payer: COMMERCIAL

## 2019-09-24 VITALS
WEIGHT: 145 LBS | HEART RATE: 92 BPM | DIASTOLIC BLOOD PRESSURE: 78 MMHG | RESPIRATION RATE: 16 BRPM | HEIGHT: 65 IN | TEMPERATURE: 97.1 F | BODY MASS INDEX: 24.16 KG/M2 | OXYGEN SATURATION: 99 % | SYSTOLIC BLOOD PRESSURE: 129 MMHG

## 2019-09-24 DIAGNOSIS — J30.89 SEASONAL ALLERGIC RHINITIS DUE TO OTHER ALLERGIC TRIGGER: ICD-10-CM

## 2019-09-24 DIAGNOSIS — R06.09 DOE (DYSPNEA ON EXERTION): ICD-10-CM

## 2019-09-24 DIAGNOSIS — J45.40 REACTIVE AIRWAY DISEASE, MODERATE PERSISTENT, UNCOMPLICATED: Primary | ICD-10-CM

## 2019-09-24 PROCEDURE — 99213 OFFICE O/P EST LOW 20 MIN: CPT | Performed by: INTERNAL MEDICINE

## 2019-09-24 RX ORDER — ALBUTEROL SULFATE 90 UG/1
2 AEROSOL, METERED RESPIRATORY (INHALATION) EVERY 4 HOURS PRN
Qty: 1 INHALER | Refills: 4 | Status: SHIPPED | OUTPATIENT
Start: 2019-09-24 | End: 2022-10-11

## 2019-09-24 ASSESSMENT — ASTHMA QUESTIONNAIRES
QUESTION_2 LAST FOUR WEEKS HOW OFTEN HAVE YOU HAD SHORTNESS OF BREATH: 4
QUESTION_5 LAST FOUR WEEKS HOW WOULD YOU RATE YOUR ASTHMA CONTROL: 5
QUESTION_3 LAST FOUR WEEKS HOW OFTEN DID YOUR ASTHMA SYMPTOMS (WHEEZING, COUGHING, SHORTNESS OF BREATH, CHEST TIGHTNESS OR PAIN) WAKE YOU UP AT NIGHT OR EARLIER THAN USUAL IN THE MORNING: 5
QUESTION_1 LAST FOUR WEEKS HOW MUCH OF THE TIME DID YOUR ASTHMA KEEP YOU FROM GETTING AS MUCH DONE AT WORK, SCHOOL OR AT HOME: 5
ACT_TOTALSCORE: 24
QUESTION_4 LAST FOUR WEEKS HOW OFTEN HAVE YOU USED YOUR RESCUE INHALER OR NEBULIZER MEDICATION (SUCH AS ALBUTEROL): 5

## 2019-09-24 NOTE — PROGRESS NOTES
Chief complaint  This is a 75y.o. year old female  who comes to see me with a chief complaint of   Chief Complaint   Patient presents with    Asthma       HPI  Here with cc on follow up on asthma/RADs. She is doing well. Remains on flovent bid with prn albuterol. Has back off a little on albuterol and still doing well. Having a lot of runny nose lately. Has a lot of allergens in backyard. Has not tried any intra-nasal medications and wanted to make sure she can take an intra-nasal steroid while on inhaled steroid. NO other issues. Still on singulair at night    Past Medical History:   Diagnosis Date    Actinic keratosis     goes to derm annually    Cataract mild    Diverticulosis     Dry eye     Hyperlipidemia     Macular degeneration     dr Ary Bradley Mitral valve prolapse     diagnosed [facility] early 1990s. asymptomatic    RAD (reactive airway disease)     treated by dr Maksim Velasco.     Seasonal allergies        Past Surgical History:   Procedure Laterality Date    APPENDECTOMY  1948    DILATION AND CURETTAGE OF UTERUS      1979    EYE SURGERY      to remove ptyergium    COCO AND BSO  1989    secondary to fibroids    TUBAL LIGATION  1979       Current Outpatient Medications   Medication Sig Dispense Refill    fluticasone (FLOVENT HFA) 110 MCG/ACT inhaler Inhale 2 puffs into the lungs 2 times daily 1 Inhaler 5    montelukast (SINGULAIR) 10 MG tablet TAKE 1 TABLET BY MOUTH EVERY NIGHT 90 tablet 5    FLOVENT  MCG/ACT inhaler INHALE 2 PUFFS BY MOUTH TWICE DAILY 12 g 0    ranitidine (ZANTAC) 150 MG tablet Take 1 tablet by mouth 2 times daily 60 tablet 3    clindamycin (CLEOCIN T) 1 % external solution Apply to affected area BID prn flares [id number], pt prefers this one 60 mL 3    VENTOLIN  (90 BASE) MCG/ACT inhaler INHALE 2 PUFFS BY MOUTH EVERY 4 HOURS AS NEEDED FOR WHEEZING 18 g 0    naproxen sodium (ANAPROX) 220 MG tablet Take 220 mg by mouth 2 times daily (with

## 2019-09-24 NOTE — PATIENT INSTRUCTIONS
Continue with flovent    Use albuterol as needed    Continue with singulair    Ok to use over the counter steroids sprays     Follow up in 6 month

## 2019-10-15 ENCOUNTER — OFFICE VISIT (OUTPATIENT)
Dept: DERMATOLOGY | Age: 74
End: 2019-10-15
Payer: COMMERCIAL

## 2019-10-15 DIAGNOSIS — L57.0 AK (ACTINIC KERATOSIS): ICD-10-CM

## 2019-10-15 DIAGNOSIS — D22.9 MULTIPLE NEVI: Primary | ICD-10-CM

## 2019-10-15 DIAGNOSIS — D48.5 NEOPLASM OF UNCERTAIN BEHAVIOR OF SKIN: ICD-10-CM

## 2019-10-15 DIAGNOSIS — L73.9 FOLLICULITIS: ICD-10-CM

## 2019-10-15 PROCEDURE — 17000 DESTRUCT PREMALG LESION: CPT | Performed by: DERMATOLOGY

## 2019-10-15 PROCEDURE — 11103 TANGNTL BX SKIN EA SEP/ADDL: CPT | Performed by: DERMATOLOGY

## 2019-10-15 PROCEDURE — 17003 DESTRUCT PREMALG LES 2-14: CPT | Performed by: DERMATOLOGY

## 2019-10-15 PROCEDURE — 99213 OFFICE O/P EST LOW 20 MIN: CPT | Performed by: DERMATOLOGY

## 2019-10-15 PROCEDURE — 11102 TANGNTL BX SKIN SINGLE LES: CPT | Performed by: DERMATOLOGY

## 2019-10-16 RX ORDER — CLINDAMYCIN PHOSPHATE 11.9 MG/ML
SOLUTION TOPICAL
Qty: 60 ML | Refills: 3 | Status: SHIPPED | OUTPATIENT
Start: 2019-10-16 | End: 2020-10-20 | Stop reason: SDUPTHER

## 2019-10-17 LAB — DERMATOLOGY PATHOLOGY REPORT: NORMAL

## 2019-10-29 RX ORDER — FLUOROURACIL 50 MG/G
CREAM TOPICAL
Qty: 40 G | Refills: 0 | Status: SHIPPED | OUTPATIENT
Start: 2019-10-29 | End: 2020-06-17 | Stop reason: ALTCHOICE

## 2019-10-30 ENCOUNTER — TELEPHONE (OUTPATIENT)
Dept: DERMATOLOGY | Age: 74
End: 2019-10-30

## 2019-11-25 ENCOUNTER — TELEPHONE (OUTPATIENT)
Dept: DERMATOLOGY | Age: 74
End: 2019-11-25

## 2019-12-05 DIAGNOSIS — J45.40 REACTIVE AIRWAY DISEASE, MODERATE PERSISTENT, UNCOMPLICATED: ICD-10-CM

## 2019-12-05 RX ORDER — DEXAMETHASONE 4 MG/1
TABLET ORAL
Qty: 12 G | Refills: 0 | Status: SHIPPED | OUTPATIENT
Start: 2019-12-05 | End: 2020-01-02

## 2020-01-02 RX ORDER — DEXAMETHASONE 4 MG/1
TABLET ORAL
Qty: 12 G | Refills: 0 | Status: SHIPPED | OUTPATIENT
Start: 2020-01-02 | End: 2020-01-16

## 2020-01-16 ENCOUNTER — OFFICE VISIT (OUTPATIENT)
Dept: DERMATOLOGY | Age: 75
End: 2020-01-16
Payer: COMMERCIAL

## 2020-01-16 PROCEDURE — 17000 DESTRUCT PREMALG LESION: CPT | Performed by: DERMATOLOGY

## 2020-01-16 PROCEDURE — 99212 OFFICE O/P EST SF 10 MIN: CPT | Performed by: DERMATOLOGY

## 2020-01-16 NOTE — PROGRESS NOTES
[facility]  Laurita Dowd MD  [phone]      Loretta Galindo  1945    75 y.o. female     Date of Visit: 1/16/2020    Chief Complaint: f/u AK's  Chief Complaint   Patient presents with    Skin Lesion     Efudex F/U- forehead     Last seen:   she previously had seen Dr. Nila Galloway and Dr. Jeanne Mclean at [facility]    History of Present Illness:    Here for f/u for:  1. R medial lower FH - AK, diffusely transected at deep margin   2. L upper lateral FH - Hypertrophic actinic keratosis, diffusely transected at deep margin     Treated both with efudex for 4 weeks; completed treatment around the end of November 2019. Had a lot of inflammation, crusting and focal bleeding with treatment but have healed well. Asymptomatic now. 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 Skin: No changing moles or lesions. Past Medical History, Family History, Surgical History, Medications and Allergies reviewed. Past Medical History:   Diagnosis Date    Actinic keratosis     goes to derm annually    Cataract mild    Diverticulosis     Dry eye     Hyperlipidemia     Macular degeneration     dr Yana Choe Mitral valve prolapse     diagnosed [facility] early 1990s. asymptomatic    RAD (reactive airway disease)     treated by dr Felton Merlin.  Seasonal allergies        Past Surgical History:   Procedure Laterality Date    APPENDECTOMY  1948    DILATION AND CURETTAGE OF UTERUS      1979    EYE SURGERY      to remove ptyergium    COCO AND BSO  1989    secondary to fibroids    TUBAL LIGATION  1979       Outpatient Medications Marked as Taking for the 1/16/20 encounter (Office Visit) with Radha Wang MD   Medication Sig Dispense Refill    fluorouracil (EFUDEX) 5 % cream Apply twice daily to affected area areas for up to 4-5 weeks. Use until significant inflammation and wash hands after use.  40 g 0    clindamycin (CLEOCIN T) 1 % external solution Apply to affected area BID prn flares [id number], pt prefers this one 60 mL 3    albuterol sulfate  (90 Base) MCG/ACT inhaler Inhale 2 puffs into the lungs every 4 hours as needed for Wheezing or Shortness of Breath (or cough) 1 Inhaler 4    montelukast (SINGULAIR) 10 MG tablet TAKE 1 TABLET BY MOUTH EVERY NIGHT 90 tablet 5    FLOVENT  MCG/ACT inhaler INHALE 2 PUFFS BY MOUTH TWICE DAILY 12 g 0    VENTOLIN  (90 BASE) MCG/ACT inhaler INHALE 2 PUFFS BY MOUTH EVERY 4 HOURS AS NEEDED FOR WHEEZING 18 g 0    naproxen sodium (ANAPROX) 220 MG tablet Take 220 mg by mouth 2 times daily (with meals)      Psyllium (METAMUCIL PO) Take by mouth      Spacer/Aero-Holding Chambers (E-Z SPACER) CONCEPCION 1 Device by Does not apply route daily as needed 1 Device 0    Omega-3 Fatty Acids (FISH OIL PO) Take by mouth      Multiple Vitamins-Minerals (PRESERVISION AREDS 2) CAPS Take by mouth      Vitamin D (CHOLECALCIFEROL) 1000 UNITS CAPS capsule Take 1,000 Units by mouth daily      polyethyl glycol-propyl glycol 0.4-0.3 % (SYSTANE) 0.4-0.3 % ophthalmic solution 1 drop as needed for Dry Eyes.  Multiple Vitamins-Minerals (CENTRUM SILVER) TABS Take  by mouth daily.  cetirizine (ZYRTEC ALLERGY) 10 MG tablet Take 10 mg by mouth daily.  Calcium Carbonate-Vitamin D (CALTRATE 600+D PO) Take  by mouth.  aspirin 81 MG chewable tablet Take 81 mg by mouth daily.          No Known Allergies      Physical Examination     Gen, well-appearing    R medial lower FH and L upper latearl FH with faint erythema and barely visible biopsy sites but smooth    1 new roughly scaled pink macule on the left cheek            Assessment and Plan     1. R medial lower FH - AK, diffusely transected at deep margin   2. L upper lateral FH - Hypertrophic actinic keratosis, diffusely transected at deep margin   - Treated with efudex for 4 weeks and appears clear  -Continue sun protection and reevaluate at

## 2020-01-28 RX ORDER — ALBUTEROL SULFATE 90 UG/1
AEROSOL, METERED RESPIRATORY (INHALATION)
Qty: 18 G | Refills: 3 | Status: SHIPPED | OUTPATIENT
Start: 2020-01-28 | End: 2020-01-28

## 2020-01-28 RX ORDER — ALBUTEROL SULFATE 90 UG/1
AEROSOL, METERED RESPIRATORY (INHALATION)
Qty: 3 INHALER | Refills: 1 | Status: ON HOLD | OUTPATIENT
Start: 2020-01-28 | End: 2021-04-12

## 2020-01-28 NOTE — TELEPHONE ENCOUNTER
LOV 09/2019 NOV 03/2020  Script was sent over 1/28/2020 for 30 days and patient is requesting 90 day

## 2020-01-30 RX ORDER — FLUTICASONE PROPIONATE 110 UG/1
AEROSOL, METERED RESPIRATORY (INHALATION)
Qty: 12 G | Refills: 0 | Status: SHIPPED | OUTPATIENT
Start: 2020-01-30 | End: 2020-03-13 | Stop reason: SDUPTHER

## 2020-02-03 RX ORDER — MONTELUKAST SODIUM 10 MG/1
TABLET ORAL
Qty: 90 TABLET | Refills: 5 | Status: SHIPPED | OUTPATIENT
Start: 2020-02-03 | End: 2021-02-08

## 2020-03-13 RX ORDER — FLUTICASONE PROPIONATE 110 UG/1
AEROSOL, METERED RESPIRATORY (INHALATION)
Qty: 12 G | Refills: 3 | Status: SHIPPED | OUTPATIENT
Start: 2020-03-13 | End: 2020-06-16 | Stop reason: SDUPTHER

## 2020-03-13 NOTE — TELEPHONE ENCOUNTER
Last office visit 9/24/2020;  next appt 3/24/2020    Patient is in [place] [address] for another week and would like her Flovent refilled. She will have [facility] here transfer to a phamacy down there.

## 2020-06-16 RX ORDER — FLUTICASONE PROPIONATE 110 UG/1
AEROSOL, METERED RESPIRATORY (INHALATION)
Qty: 12 G | Refills: 3 | Status: SHIPPED | OUTPATIENT
Start: 2020-06-16 | End: 2020-06-17 | Stop reason: SDUPTHER

## 2020-06-16 NOTE — PROGRESS NOTES
gerardo    Mitral valve prolapse     diagnosed [facility] early 1990s. asymptomatic    RAD (reactive airway disease)     treated by dr Khushboo Isbell.  Seasonal allergies        Past Surgical History:   Procedure Laterality Date    APPENDECTOMY  1948    DILATION AND CURETTAGE OF UTERUS      1979    EYE SURGERY      to remove ptyergium    COCO AND BSO  1989    secondary to fibroids    TUBAL LIGATION  1979       Social History     Tobacco Use    Smoking status: Never Smoker    Smokeless tobacco: Never Used   Substance Use Topics    Alcohol use: Yes     Alcohol/week: 0.0 standard drinks     Comment: rarely       Family History   Problem Relation Age of Onset    Cancer Mother 66        lung (was a smoker)    Heart Disease Mother     Diabetes Father     High Blood Pressure Sister     High Blood Pressure Sister     Dementia Sister         PARTIALLY    Diabetes Maternal Aunt     Diabetes Paternal Uncle            Review of Systems  See hpi    Objective:   Physical Exam  Constitutional:   · Reviewed vitals above  · Well Nourished, well developed, no distress       HENT:  · Normal external nose without lesions  · Normal oropharynx without erythema or exudate  Neck:  · Symmetric and without masses  · No thyromegaly  Resp:  · Normal effort  · Clear to auscultation bilaterally without rhonchi, wheezing or crackles  Cardiovascular:  · On auscultation, normal S1 and S2 without murmurs, rubs or gallops  · No bruits of bilateral carotids and no JVD  Gastrointestinal:  · Nontender, nondistended, and no masses  · No hepatosplenomegaly  Musculoskeletal:  · All extremities without clubbing, cyanosis or edema  Skin:  · No rashes on inspection  Psych:  · Normal mood and affect  · Normal insight and judgement    Assessment:      See below      Plan:      1. RAD (reactive airway disease), mild intermittent, uncomplicated  Well controlled on flovent, singulair albuterol prn. Has f/u appt with dr Khushboo Isbell this afternoon.     2.

## 2020-06-17 ENCOUNTER — OFFICE VISIT (OUTPATIENT)
Dept: FAMILY MEDICINE CLINIC | Age: 75
End: 2020-06-17
Payer: COMMERCIAL

## 2020-06-17 ENCOUNTER — OFFICE VISIT (OUTPATIENT)
Dept: PULMONOLOGY | Age: 75
End: 2020-06-17
Payer: COMMERCIAL

## 2020-06-17 VITALS
WEIGHT: 141 LBS | HEIGHT: 64 IN | HEART RATE: 95 BPM | BODY MASS INDEX: 24.07 KG/M2 | TEMPERATURE: 97.8 F | RESPIRATION RATE: 16 BRPM | OXYGEN SATURATION: 98 % | DIASTOLIC BLOOD PRESSURE: 76 MMHG | SYSTOLIC BLOOD PRESSURE: 109 MMHG

## 2020-06-17 VITALS
HEART RATE: 95 BPM | OXYGEN SATURATION: 97 % | WEIGHT: 144 LBS | RESPIRATION RATE: 16 BRPM | SYSTOLIC BLOOD PRESSURE: 138 MMHG | DIASTOLIC BLOOD PRESSURE: 84 MMHG | HEIGHT: 64 IN | TEMPERATURE: 97.2 F | BODY MASS INDEX: 24.59 KG/M2

## 2020-06-17 PROCEDURE — 99213 OFFICE O/P EST LOW 20 MIN: CPT | Performed by: INTERNAL MEDICINE

## 2020-06-17 PROCEDURE — 99214 OFFICE O/P EST MOD 30 MIN: CPT | Performed by: FAMILY MEDICINE

## 2020-06-17 RX ORDER — AZELASTINE 1 MG/ML
1 SPRAY, METERED NASAL 2 TIMES DAILY
Qty: 1 BOTTLE | Refills: 5 | Status: SHIPPED | OUTPATIENT
Start: 2020-06-17 | End: 2021-06-07

## 2020-06-17 RX ORDER — FAMOTIDINE 20 MG/1
20 TABLET, FILM COATED ORAL DAILY
Status: ON HOLD | COMMUNITY
End: 2021-06-30 | Stop reason: HOSPADM

## 2020-06-17 RX ORDER — FLUTICASONE PROPIONATE 110 UG/1
AEROSOL, METERED RESPIRATORY (INHALATION)
Qty: 3 INHALER | Refills: 1 | Status: SHIPPED | OUTPATIENT
Start: 2020-06-17 | End: 2020-12-30

## 2020-06-17 RX ORDER — IBUPROFEN 200 MG
200 TABLET ORAL EVERY 6 HOURS PRN
COMMUNITY

## 2020-06-17 ASSESSMENT — ASTHMA QUESTIONNAIRES
QUESTION_2 LAST FOUR WEEKS HOW OFTEN HAVE YOU HAD SHORTNESS OF BREATH: 4
QUESTION_4 LAST FOUR WEEKS HOW OFTEN HAVE YOU USED YOUR RESCUE INHALER OR NEBULIZER MEDICATION (SUCH AS ALBUTEROL): 4
QUESTION_1 LAST FOUR WEEKS HOW MUCH OF THE TIME DID YOUR ASTHMA KEEP YOU FROM GETTING AS MUCH DONE AT WORK, SCHOOL OR AT HOME: 5
QUESTION_3 LAST FOUR WEEKS HOW OFTEN DID YOUR ASTHMA SYMPTOMS (WHEEZING, COUGHING, SHORTNESS OF BREATH, CHEST TIGHTNESS OR PAIN) WAKE YOU UP AT NIGHT OR EARLIER THAN USUAL IN THE MORNING: 5
ACT_TOTALSCORE: 22
QUESTION_5 LAST FOUR WEEKS HOW WOULD YOU RATE YOUR ASTHMA CONTROL: 4

## 2020-06-17 NOTE — PROGRESS NOTES
11/2018  Moderately reduced exercise achievement at 67 % maximum oxygen   predicted with maximal work load of only 48% and 2.8 METS   achieved. AT ( anaerobic threshold ) achieved at around < 20%   predicted suggestive of muscle deconditioning and or decreased   cardiac out put which is less likely with normal VO 2 pulse. Correlate clinically    Cardiac parameters within normal limits with normal HR response   and oxygen pulse achievement but elevated HRR also suggestive of   some deconditioning. Normal BP response at VO 2 max. EKG normal   with no ischemic changes or cardiac arrhthymias. Correlate   clinically    Ventilatory parameters within normal limits overall with no   obstructive or restrictive lung disease seen. Higher minute   ventilation likely related to some tachypnea but other wise   normal. Ventilatory efficiency for Co 2 and O 2 were within   normal limits with no obvious dead space ventilation and no Co 2   retention post exercise. ABG post exercise normal with normal A-a   gradient. Correlate clinically    Note: Patient stopped test due to legs could not pedal anymore   and Shortness of breath. No results found for: WBC    No results found for: BNP    No results found for: CREATININE     Total IgE Level:  60  Significant allergies:  Cow's milk    ASTHMA CONTROL TEST 6/17/2020 9/24/2019 3/26/2019 9/25/2018 3/26/2018 9/26/2017 3/21/2017   In the past 4 weeks, how much of the time did your asthma keep you from getting as much done at work, school or at home? 5 5 5 3 5 4 5   During the past 4 weeks, how often have you had shortness of breath? 4 4 5 3 4 4 4   During the past 4 weeks, how often did your asthma symptoms (wheezing, coughing, shortness of breath, chest tightness or pain) wake you up at night or earlier than usual in the morning? 5 5 5 5 5 5 5   During the past 4 weeks, how often have you used your rescue inhaler or nebulizer medication (such as albuterol)?  4 5 5 2 2 2 2   How would you rate your asthma control during the past 4 weeks? 4 5 5 4 5 4 4   Asthma Control Test Total Score 22 24 25 17 21 19 20       Assessment/Plan:  1. Reactive airway disease, moderate persistent, uncomplicated  Controlled with flovent and albuterol. Continue with this  - fluticasone (FLOVENT HFA) 110 MCG/ACT inhaler; INHALE 2 PUFFS BY MOUTH TWICE DAILY  Dispense: 3 Inhaler; Refill: 1    2. Seasonal allergic rhinitis due to other allergic trigger  Try using astelin. Start off qd and work up to bid due to trend toward significant dryness  - azelastine (ASTELIN) 0.1 % nasal spray; 1 spray by Nasal route 2 times daily Use in each nostril as directed  Dispense: 1 Bottle;  Refill: 5      Follow up in 6 months

## 2020-07-09 ENCOUNTER — VIRTUAL VISIT (OUTPATIENT)
Dept: FAMILY MEDICINE CLINIC | Age: 75
End: 2020-07-09
Payer: COMMERCIAL

## 2020-07-09 PROCEDURE — G0439 PPPS, SUBSEQ VISIT: HCPCS | Performed by: NURSE PRACTITIONER

## 2020-07-09 ASSESSMENT — LIFESTYLE VARIABLES
HOW OFTEN DURING THE LAST YEAR HAVE YOU NEEDED AN ALCOHOLIC DRINK FIRST THING IN THE MORNING TO GET YOURSELF GOING AFTER A NIGHT OF HEAVY DRINKING: 0
HAVE YOU OR SOMEONE ELSE BEEN INJURED AS A RESULT OF YOUR DRINKING: 0
HOW OFTEN DURING THE LAST YEAR HAVE YOU FAILED TO DO WHAT WAS NORMALLY EXPECTED FROM YOU BECAUSE OF DRINKING: 0
AUDIT-C TOTAL SCORE: 1
HOW OFTEN DO YOU HAVE A DRINK CONTAINING ALCOHOL: 1
HOW MANY STANDARD DRINKS CONTAINING ALCOHOL DO YOU HAVE ON A TYPICAL DAY: 0
HOW OFTEN DURING THE LAST YEAR HAVE YOU BEEN UNABLE TO REMEMBER WHAT HAPPENED THE NIGHT BEFORE BECAUSE YOU HAD BEEN DRINKING: 0
HOW OFTEN DURING THE LAST YEAR HAVE YOU HAD A FEELING OF GUILT OR REMORSE AFTER DRINKING: 0
HAS A RELATIVE, FRIEND, DOCTOR, OR ANOTHER HEALTH PROFESSIONAL EXPRESSED CONCERN ABOUT YOUR DRINKING OR SUGGESTED YOU CUT DOWN: 0
AUDIT TOTAL SCORE: 1
HOW OFTEN DURING THE LAST YEAR HAVE YOU FOUND THAT YOU WERE NOT ABLE TO STOP DRINKING ONCE YOU HAD STARTED: 0
HOW OFTEN DO YOU HAVE SIX OR MORE DRINKS ON ONE OCCASION: 0

## 2020-07-09 ASSESSMENT — PATIENT HEALTH QUESTIONNAIRE - PHQ9
SUM OF ALL RESPONSES TO PHQ QUESTIONS 1-9: 0
SUM OF ALL RESPONSES TO PHQ QUESTIONS 1-9: 0

## 2020-07-09 NOTE — PROGRESS NOTES
Medicare Annual Wellness Visit  Name: Suzy Hill Date: 2020   MRN: [id number] Sex: Female   Age: 67 y.o. Ethnicity: Non-/Non    : 1945 Race: Bassam Reyna is here for Medicare AWV    Screenings for behavioral, psychosocial and functional/safety risks, and cognitive dysfunction are all negative except as indicated below. These results, as well as other patient data from the [address] [facility] [place] by [facility] [address] form, are documented in Flowsheets linked to this Encounter. No Known Allergies    Prior to Visit Medications    Medication Sig Taking?  Authorizing Provider   famotidine (PEPCID) 20 MG tablet Take 20 mg by mouth daily as needed  Historical Provider, MD   ibuprofen (ADVIL;MOTRIN) 200 MG tablet Take 200 mg by mouth every 6 hours as needed for Pain  Historical Provider, MD   Pseudoephedrine HCl (SUDAFED 12 HOUR PO) Take by mouth  Historical Provider, MD   fluticasone (FLOVENT HFA) 110 MCG/ACT inhaler INHALE 2 PUFFS BY MOUTH TWICE DAILY  Javid Swanson, DO   azelastine (ASTELIN) 0.1 % nasal spray 1 spray by Nasal route 2 times daily Use in each nostril as directed  Javid Swanson DO   montelukast (SINGULAIR) 10 MG tablet TAKE 1 TABLET BY MOUTH EVERY NIGHT  Javid Swanson, DO   albuterol sulfate  (90 Base) MCG/ACT inhaler INHALE 2 PUFFS BY MOUTH EVERY 4 HOURS AS NEEDED FOR WHEEZING  Javid Swanson, DO   clindamycin (CLEOCIN T) 1 % external solution Apply to affected area BID prn flares [id number], pt prefers this one  Livia Engle MD   albuterol sulfate  (90 Base) MCG/ACT inhaler Inhale 2 puffs into the lungs every 4 hours as needed for Wheezing or Shortness of Breath (or cough)  Javid Swanson, DO   Psyllium (METAMUCIL PO) Take by mouth  Historical Provider, MD   Spacer/Aero-Holding Chambers (E-Z SPACER) CONCEPCION 1 Device by Does not apply route daily as needed  Javid Swanson DO   Omega-3 Fatty Acids (FISH OIL PO) Take by mouth  Historical Provider, MD   Multiple Vitamins-Minerals (PRESERVISION AREDS 2) CAPS Take by mouth  Historical Provider, MD   Vitamin D (CHOLECALCIFEROL) 1000 UNITS CAPS capsule Take 1,000 Units by mouth daily  Historical Provider, MD   polyethyl glycol-propyl glycol 0.4-0.3 % (SYSTANE) 0.4-0.3 % ophthalmic solution 1 drop as needed for Dry Eyes. Historical Provider, MD   Multiple Vitamins-Minerals (CENTRUM SILVER) TABS Take  by mouth daily. Historical Provider, MD   cetirizine (ZYRTEC ALLERGY) 10 MG tablet Take 10 mg by mouth daily. Historical Provider, MD   Calcium Carbonate-Vitamin D (CALTRATE 600+D PO) Take  by mouth. Historical Provider, MD   aspirin 81 MG chewable tablet Take 81 mg by mouth daily. Historical Provider, MD       Past Medical History:   Diagnosis Date    Actinic keratosis     goes to derm annually    Cataract mild    Diverticulosis     Dry eye     Hyperlipidemia     Macular degeneration     dr Ina Denney Mitral valve prolapse     diagnosed [facility] early 1990s. asymptomatic    RAD (reactive airway disease)     treated by dr Prasanth Lomax.     Seasonal allergies        Past Surgical History:   Procedure Laterality Date    APPENDECTOMY  1948    DILATION AND CURETTAGE OF UTERUS      1979    EYE SURGERY      to remove ptyergium    COCO AND BSO  1989    secondary to fibroids    TUBAL LIGATION  1979       Family History   Problem Relation Age of Onset    Cancer Mother 66        lung (was a smoker)    Heart Disease Mother     Diabetes Father     High Blood Pressure Sister     High Blood Pressure Sister     Dementia Sister         PARTIALLY    Diabetes Maternal Aunt     Diabetes Paternal Uncle        CareTeam (Including outside providers/suppliers regularly involved in providing care):   Patient Care Team:  Catracho Ramos MD as PCP - General (Family Medicine)  Catracho Ramos MD as PCP - [facility] Provider  Aliyah Hammond MD as Consulting Physician (Obstetrics & Gynecology)  Jill Fenton MD as Consulting Physician (Ophthalmology)  David Rodriguez MD as Surgeon (Orthopedic Surgery)  Lara Pappas MD as Consulting Physician (Gastroenterology)  Velva Severance, DO as Consulting Physician (Pulmonology)  Lorie Millard MD (Dermatology)    Wt Readings from Last 3 Encounters:   06/17/20 141 lb (64 kg)   06/17/20 144 lb (65.3 kg)   09/24/19 145 lb (65.8 kg)     There were no vitals filed for this visit. There is no height or weight on file to calculate BMI. Based upon direct observation of the patient, evaluation of cognition reveals recent and remote memory intact. Patient's complete Health Risk Assessment and screening values have been reviewed and are found in Flowsheets. The following problems were reviewed today and where indicated follow up appointments were made and/or referrals ordered. Positive Risk Factor Screenings with Interventions:     Health Habits/Nutrition:  Health Habits/Nutrition  Do you exercise for at least 20 minutes 2-3 times per week?: (!) No  Have you lost any weight without trying in the past 3 months?: No  Do you eat fewer than 2 meals per day?: No  Have you seen a dentist within the past year?: Yes  There is no height or weight on file to calculate BMI.   Health Habits/Nutrition Interventions:  · Inadequate physical activity:  patient agrees to increase physical activity as follows: take more walks    Personalized Preventive Plan   Current Health Maintenance Status  Immunization History   Administered Date(s) Administered    Hepatitis A 08/30/2006, 03/08/2007    Influenza 10/09/2013, 10/06/2014    Influenza Vaccine, unspecified formulation 11/07/2010, 11/28/2011, 09/11/2012, 10/09/2013, 10/06/2014    Influenza Virus Vaccine 10/22/2007, 10/20/2008, 10/06/2014, 10/06/2015, 10/06/2015    Influenza, High Dose (Fluzone 65 yrs and older) 09/15/2016, 09/20/2017, 09/04/2018    Pneumococcal Conjugate 13-valent ([id number]) 11/06/2015    Pneumococcal Polysaccharide ([id number]) 11/28/2011    Polio IPV (IPOL) 03/08/2006    Tdap (Boostrix, Adacel) 07/10/2006, 02/01/2009, 02/26/2009, 04/01/2019    Typhoid Vaccine 06/14/2008    Yellow Fever (YF-Vax) 08/30/2006    Zoster Live (Zostavax) 09/22/2011    Zoster Recombinant (Shingrix) 03/21/2018, 05/23/2018        Health Maintenance   Topic Date Due    Annual Wellness Visit (AWV)  05/29/2019    Flu vaccine (1) 09/01/2020    DTaP/Tdap/Td vaccine (5 - Td) 04/01/2029    DEXA (modify frequency per FRAX score)  Completed    Shingles Vaccine  Completed    Pneumococcal 65+ years Vaccine  Completed    Hepatitis A vaccine  Aged Out    Hepatitis B vaccine  Aged Out    Hib vaccine  Aged Out    Meningococcal (ACWY) vaccine  Aged Out     Recommendations for [place] Due: see orders and patient instructions/AVS.  . Recommended screening schedule for the next 5-10 years is provided to the patient in written form: see Patient Shlomo Rangel was seen today for medicare awv. Diagnoses and all orders for this visit:    Pure hypercholesterolemia  -     Lipid Panel; Future    Vitamin D deficiency  -     Vitamin D 25 Hydroxy; Future    Screening for diabetes mellitus  -     Glucose; Future    Routine general medical examination at a health care facility      HM: Patient is already scheduled for DEXA scan and mammogram.         Chilo Haskins is a 67 y.o. female being evaluated by a Virtual Visit (video and audio) encounter to address concerns as mentioned above. A caregiver was present when appropriate. Due to this being a TeleHealth encounter (During JRXJN-89 public health emergency), evaluation of the following organ systems was limited: Vitals/Constitutional/EENT/Resp/CV/GI//MS/Neuro/Skin/Heme-Lymph-Imm.   Pursuant to the emergency declaration under the [address] and the National Emergencies Act, [facility] waiver authority and the [employer] Act, this Virtual Visit was conducted with patient's (and/or legal guardian's) consent, to reduce the patient's risk of exposure to COVID-19 and provide necessary medical care. The patient (and/or legal guardian) has also been advised to contact this office for worsening conditions or problems, and seek emergency medical treatment and/or call 911 if deemed necessary. Patient identification was verified at the start of the visit: Yes    Services were provided through a video synchronous discussion virtually to substitute for in-person clinic visit. Patient and provider were located at their individual homes. --AAMIR Shea CNP on 7/9/2020 at 10:44 AM    An electronic signature was used to authenticate this note.

## 2020-07-09 NOTE — PATIENT INSTRUCTIONS
Personalized Preventive Plan for Hollis Bras - 7/9/2020  Medicare offers a range of preventive health benefits. Some of the tests and screenings are paid in full while other may be subject to a deductible, co-insurance, and/or copay. Some of these benefits include a comprehensive review of your medical history including lifestyle, illnesses that may run in your family, and various assessments and screenings as appropriate. After reviewing your medical record and screening and assessments performed today your provider may have ordered immunizations, labs, imaging, and/or referrals for you. A list of these orders (if applicable) as well as your Preventive Care list are included within your After Visit Summary for your review. Other Preventive Recommendations:    · A preventive eye exam performed by an eye specialist is recommended every 1-2 years to screen for glaucoma; cataracts, macular degeneration, and other eye disorders. · A preventive dental visit is recommended every 6 months. · Try to get at least 150 minutes of exercise per week or 10,000 steps per day on a pedometer . · Order or download the FREE \"Exercise & Physical Activity: Your Everyday Guide\" from [employer]. Call 3-300.255.7933 or search [employer] online. · You need 4726-2839 mg of calcium and 8925-4905 IU of vitamin D per day. It is possible to meet your calcium requirement with diet alone, but a vitamin D supplement is usually necessary to meet this goal.  · When exposed to the sun, use a sunscreen that protects against both UVA and UVB radiation with an SPF of 30 or greater. Reapply every 2 to 3 hours or after sweating, drying off with a towel, or swimming. · Always wear a seat belt when traveling in a car. Always wear a helmet when riding a bicycle or motorcycle.

## 2020-07-10 ENCOUNTER — NURSE ONLY (OUTPATIENT)
Dept: FAMILY MEDICINE CLINIC | Age: 75
End: 2020-07-10
Payer: COMMERCIAL

## 2020-07-10 VITALS — TEMPERATURE: 96.4 F

## 2020-07-10 LAB
CHOLESTEROL, TOTAL: 223 MG/DL (ref 0–199)
GLUCOSE BLD-MCNC: 93 MG/DL (ref 70–99)
HDLC SERPL-MCNC: 118 MG/DL (ref 40–60)
LDL CHOLESTEROL CALCULATED: 98 MG/DL
TRIGL SERPL-MCNC: 36 MG/DL (ref 0–150)
VITAMIN D 25-HYDROXY: 47.5 NG/ML
VLDLC SERPL CALC-MCNC: 7 MG/DL

## 2020-07-10 PROCEDURE — 36415 COLL VENOUS BLD VENIPUNCTURE: CPT | Performed by: NURSE PRACTITIONER

## 2020-07-21 ENCOUNTER — HOSPITAL ENCOUNTER (OUTPATIENT)
Dept: WOMENS IMAGING | Age: 75
Discharge: HOME | End: 2020-07-21
Payer: COMMERCIAL

## 2020-07-21 PROCEDURE — 77080 DXA BONE DENSITY AXIAL: CPT

## 2020-07-21 PROCEDURE — 77063 BREAST TOMOSYNTHESIS BI: CPT

## 2020-07-23 ENCOUNTER — TELEPHONE (OUTPATIENT)
Dept: FAMILY MEDICINE CLINIC | Age: 75
End: 2020-07-23

## 2020-07-23 NOTE — TELEPHONE ENCOUNTER
[facility] calling in wanting a copy of PT's recent mammogram.      Best call [phone]

## 2020-08-04 ENCOUNTER — HOSPITAL ENCOUNTER (OUTPATIENT)
Dept: ULTRASOUND IMAGING | Age: 75
Discharge: HOME | End: 2020-08-04
Payer: COMMERCIAL

## 2020-08-04 ENCOUNTER — HOSPITAL ENCOUNTER (OUTPATIENT)
Dept: WOMENS IMAGING | Age: 75
Discharge: HOME | End: 2020-08-04
Payer: COMMERCIAL

## 2020-08-04 PROCEDURE — G0279 TOMOSYNTHESIS, MAMMO: HCPCS

## 2020-08-04 PROCEDURE — 76642 ULTRASOUND BREAST LIMITED: CPT

## 2020-09-21 ENCOUNTER — NURSE TRIAGE (OUTPATIENT)
Dept: OTHER | Facility: CLINIC | Age: 75
End: 2020-09-21

## 2020-09-21 ENCOUNTER — OFFICE VISIT (OUTPATIENT)
Dept: FAMILY MEDICINE CLINIC | Age: 75
End: 2020-09-21
Payer: COMMERCIAL

## 2020-09-21 VITALS
DIASTOLIC BLOOD PRESSURE: 70 MMHG | HEART RATE: 89 BPM | HEIGHT: 64 IN | BODY MASS INDEX: 23.9 KG/M2 | WEIGHT: 140 LBS | SYSTOLIC BLOOD PRESSURE: 133 MMHG

## 2020-09-21 LAB
A/G RATIO: 2 (ref 1.1–2.2)
ALBUMIN SERPL-MCNC: 4.5 G/DL (ref 3.4–5)
ALP BLD-CCNC: 125 U/L (ref 40–129)
ALT SERPL-CCNC: 23 U/L (ref 10–40)
ANION GAP SERPL CALCULATED.3IONS-SCNC: 15 MMOL/L (ref 3–16)
AST SERPL-CCNC: 29 U/L (ref 15–37)
BASOPHILS ABSOLUTE: 0 K/UL (ref 0–0.2)
BASOPHILS RELATIVE PERCENT: 0.5 %
BILIRUB SERPL-MCNC: <0.2 MG/DL (ref 0–1)
BUN BLDV-MCNC: 10 MG/DL (ref 7–20)
CALCIUM SERPL-MCNC: 9.7 MG/DL (ref 8.3–10.6)
CHLORIDE BLD-SCNC: 104 MMOL/L (ref 99–110)
CO2: 26 MMOL/L (ref 21–32)
CREAT SERPL-MCNC: <0.5 MG/DL (ref 0.6–1.2)
EOSINOPHILS ABSOLUTE: 0.1 K/UL (ref 0–0.6)
EOSINOPHILS RELATIVE PERCENT: 1.3 %
GFR AFRICAN AMERICAN: >60
GFR NON-AFRICAN AMERICAN: >60
GLOBULIN: 2.2 G/DL
GLUCOSE BLD-MCNC: 97 MG/DL (ref 70–99)
HCT VFR BLD CALC: 39.9 % (ref 36–48)
HEMOGLOBIN: 13.3 G/DL (ref 12–16)
LYMPHOCYTES ABSOLUTE: 0.9 K/UL (ref 1–5.1)
LYMPHOCYTES RELATIVE PERCENT: 19.3 %
MCH RBC QN AUTO: 28.7 PG (ref 26–34)
MCHC RBC AUTO-ENTMCNC: 33.3 G/DL (ref 31–36)
MCV RBC AUTO: 86.4 FL (ref 80–100)
MONOCYTES ABSOLUTE: 0.4 K/UL (ref 0–1.3)
MONOCYTES RELATIVE PERCENT: 8.3 %
NEUTROPHILS ABSOLUTE: 3.4 K/UL (ref 1.7–7.7)
NEUTROPHILS RELATIVE PERCENT: 70.6 %
PDW BLD-RTO: 13.9 % (ref 12.4–15.4)
PLATELET # BLD: 256 K/UL (ref 135–450)
PMV BLD AUTO: 7.6 FL (ref 5–10.5)
POTASSIUM SERPL-SCNC: 4.1 MMOL/L (ref 3.5–5.1)
RBC # BLD: 4.62 M/UL (ref 4–5.2)
SODIUM BLD-SCNC: 145 MMOL/L (ref 136–145)
TOTAL PROTEIN: 6.7 G/DL (ref 6.4–8.2)
TSH REFLEX: 1.04 UIU/ML (ref 0.27–4.2)
WBC # BLD: 4.9 K/UL (ref 4–11)

## 2020-09-21 PROCEDURE — 36415 COLL VENOUS BLD VENIPUNCTURE: CPT | Performed by: FAMILY MEDICINE

## 2020-09-21 PROCEDURE — 93000 ELECTROCARDIOGRAM COMPLETE: CPT | Performed by: FAMILY MEDICINE

## 2020-09-21 PROCEDURE — 99214 OFFICE O/P EST MOD 30 MIN: CPT | Performed by: FAMILY MEDICINE

## 2020-09-21 NOTE — PROGRESS NOTES
PROGRESS NOTE     Miki Orozco MD  [facility] [place] [address]  [phone] office  [phone] fax    Date of Service:  9/21/2020    Subjective:      Patient ID: .Jacey Gifford is a 67 y.o. female       CC: dizziness episode, LOC    HPI     Dizziness:   3 days ago, patient walked from the dining room to her kitchen when she suddenly felt like she was spinning. She remembers turing around to get to the kitchen chair, but the next thing she remembers is waking up laying on her right hip on the kitchen chair with her body extending off of it. She is not sure if she passed out, but back was a little sore, and she is wondering if she likely fell. When she did wake up, her dizziness was completely resolved. She had a mild frontal HA later that afternoon, achy and heavy in nature. HA went away, and has been coming and going since. It is always mild, resolves with ibuprofen. No associated symptoms. She has not had any dizziness since. (She was diagnosed with a viral labyrinthitis about 15 years ago, but does not usually have any chronic vertiginous issues)    She has had intermittent \"buzzing\" in her ears for 1-2 years, but has been worse the last 3 months. She states she did not have any buzzing in her ears 3 days ago when she had the dizziness and the fall         She denies ear pain/pressure decreased hearing upper respiratory symptoms fevers/night sweats visual change nausea/vomiting change in speech cognitive/personality change paresthesias weakness fatigue/lethargy exertional chest pain/pressure dyspnea on exertion palpitations pre-syncope edema orthopnea/PND diarrhea polydipsia/polyuria involuntary weight loss unexplained weight gain.            Vitals:    09/21/20 1300 09/21/20 1307   BP: (!) 171/103 133/70   Pulse: 103 89   Weight: 140 lb (63.5 kg)    Height: 5' 4\" (1.626 m)        Outpatient Medications Marked as Taking for the 9/21/20 oropharynx without erythema or exudate  · Normal nasal mucosa without swelling or erythema  · No pain with palpation of sinuses  NEURO  · Cranial nerves II through XII intact  · Negative Romberg  · No dysmetria  · No dysdiadochokinesia  · Negative [place]-Hallpike bilaterally  · No signs of dizziness when asked to extend neck for 30 seconds. Neck:  · Symmetric and without masses  · No thyromegaly  Resp:  · Normal effort  · Clear to auscultation bilaterally without rhonchi, wheezing or crackles  Cardiovascular:  · On auscultation, normal S1 and S2 without murmurs, rubs or gallops  · No bruits of bilateral carotids and no JVD  Gastrointestinal:  · Nontender, nondistended, and no masses  · No hepatosplenomegaly  Musculoskeletal:  · Normal Gait  · All extremities without clubbing, cyanosis or edema  Skin:  · No rashes on inspection  · No areas of increased heat or induration on palpation  Psych:  · Normal mood and affect  · Normal insight and judgement    Assessment / Plan:     1. Syncope, unspecified syncope type  Unclear etiology. Patient is certain she did not have presyncopal symptoms. She is certain she felt like she was spinning and it was true dizziness. TIA would be in differential, but unlikely to only have occurred for seconds, without any short-term neurologic deficits. EKG was completed and showed normal sinus rhythm. No change when compared to EKG from June 2015. She did not have any coexisting cardiac symptoms suggestive of an underlying occult arrhythmia or structural issue. Will let cardiology decide whether or not further cardiac testing is needed. Referring to neurologist and cardiologist for further evaluation.  - Geeta Henry MD, Neurology, Ren Benjamin MD, Cardiology, [facility]    2. Dizziness  Unclear etiology.   Does have buzzing in her ears is been going on for quite some time, but short quick incident would be inconsistent with Ménière's disease. May eventually need ENT referral, but because Pt patient passed out, will start with evaluation with neurology and cardiology, to rule out central neurologic deficit and cardiac condition. May need to rule out schwannoma as well. No dizziness today with neck extension, making arterial disease in vertebral arteries unlikely. No signs of BPPV on exam.  Labyrinthitis would not be so quick.     She has completely normal neurologic exam today, with no signs of dizziness on exam.    Checking the following to ensure within normal limits and not indicative of a different underlying etiology:  - CBC Auto Differential  - Comprehensive Metabolic Panel  - TSH with Reflex

## 2020-09-22 ENCOUNTER — PATIENT MESSAGE (OUTPATIENT)
Dept: FAMILY MEDICINE CLINIC | Age: 75
End: 2020-09-22

## 2020-09-22 ENCOUNTER — TELEPHONE (OUTPATIENT)
Dept: FAMILY MEDICINE CLINIC | Age: 75
End: 2020-09-22

## 2020-09-22 NOTE — TELEPHONE ENCOUNTER
I called and got the patient an appointment for September the 24th at [address] [place] [address] at the [facility], I called and informed the patient

## 2020-09-22 NOTE — TELEPHONE ENCOUNTER
DENICEI     Pt is scheduled w/ the Cardiologist Dr. Yissel Jacobo on 9/29/20 @ 8:30am    Pt also scheduled w/ [facility] Neurology Dr Dorcas Reaves on 10/28/20 @ 8:30am    You had asked her to contact you if she couldn't get in with Neurology in the next 3 weeks. Pt wanting to know if there is anyone else you recommend? The only request from the pt is that she will NOT see any of the Dr. Richardson Bud.      Please advise

## 2020-09-22 NOTE — TELEPHONE ENCOUNTER
From: Zbigniew Badillo  To: Nikita Mcallister MD  Sent: 9/22/2020 11:00 AM EDT  Subject: Visit Follow-Up Question    Dr Kathy Ponce,  Re: cardio & neuro appts:   Cardiology appt is w/ Dr Sara Wu on Tues Sept 29 8:30am   Neurology appt is w/ Dr Eli Rowe on Wed Oct 28 8:30am is the earliest available   If you are able to get me in earlier, I do not want to see Dr Esha Chester. ..not any of the 3 of them  [facility]

## 2020-09-22 NOTE — TELEPHONE ENCOUNTER
Please call [facility] and see if patient can be seen sooner. Please document call and then close encounter.   thanks

## 2020-09-26 ENCOUNTER — HOSPITAL ENCOUNTER (OUTPATIENT)
Dept: MRI IMAGING | Age: 75
Discharge: HOME | End: 2020-09-26
Payer: COMMERCIAL

## 2020-09-26 PROCEDURE — 70551 MRI BRAIN STEM W/O DYE: CPT

## 2020-09-28 NOTE — PROGRESS NOTES
[facility]      Cardiology Consult    Bethany Hollis  [birth year]    September 29, 2020    Referring Physician: Neisha Cisse MD  Reason for Referral: Syncope     CC: \"I passed out. \"     HPI:  The patient is 67 y.o. female with no known cardiac history who presents today for evaluation of syncope. She reports a history of migraines and saw neurology after the event as well. She states she had a headache and felt \"foggy\" but denies typical prodromal symptoms. She felt like she needed to sit down but at the time was unaware that she would pass out. She awoke in a chair and denied any injuries. She reported a headache for 2 days after which was unrelieved by APAP or NSAIDs. She saw her neurologist who thought the episode may be likely related to a migraine. She completed an MRI that showed nothing acute. She reports a prior history of vertigo and states this episode was not similar. She also had an episode of more typical sounding vasovagal syncope as a child when she passed out in [employer]. She denies any recurrent syncope, lightheadedness, or vertigo. She reports compliance with her medications and tolerating. She denies any palpitations or sensation of her heart racing. Patient denies exertional chest pain/pressure, dyspnea at rest, worsening BARRIOS, PND, orthopnea, weight changes, changes in LE edema. Past Medical History:   Diagnosis Date    Actinic keratosis     goes to derm annually    Cataract mild    Diverticulosis     Dry eye     Hyperlipidemia     Macular degeneration     dr Preet Tee Mitral valve prolapse     diagnosed [facility] early 1990s. asymptomatic    Osteopenia     RAD (reactive airway disease)     treated by dr Indu Chau.     Seasonal allergies      Past Surgical History:   Procedure Laterality Date    APPENDECTOMY  1948    DILATION AND CURETTAGE OF UTERUS      1979    EYE SURGERY      to remove ptyergium    COCO AND BSO  1989    secondary to fibroids    TUBAL LIGATION  1979     Family History   Problem Relation Age of Onset    Cancer Mother 66        lung (was a smoker)    Heart Disease Mother     Diabetes Father     High Blood Pressure Sister     High Blood Pressure Sister     Dementia Sister         PARTIALLY    Diabetes Maternal Aunt     Diabetes Paternal Uncle      Social History     Tobacco Use    Smoking status: Never Smoker    Smokeless tobacco: Never Used   Substance Use Topics    Alcohol use:  Yes     Alcohol/week: 0.0 standard drinks     Comment: rarely    Drug use: No       No Known Allergies  Current Outpatient Medications   Medication Sig Dispense Refill    famotidine (PEPCID) 20 MG tablet Take 20 mg by mouth daily as needed      ibuprofen (ADVIL;MOTRIN) 200 MG tablet Take 200 mg by mouth every 6 hours as needed for Pain      Pseudoephedrine HCl (SUDAFED 12 HOUR PO) Take by mouth      fluticasone (FLOVENT HFA) 110 MCG/ACT inhaler INHALE 2 PUFFS BY MOUTH TWICE DAILY 3 Inhaler 1    azelastine (ASTELIN) 0.1 % nasal spray 1 spray by Nasal route 2 times daily Use in each nostril as directed 1 Bottle 5    montelukast (SINGULAIR) 10 MG tablet TAKE 1 TABLET BY MOUTH EVERY NIGHT 90 tablet 5    albuterol sulfate  (90 Base) MCG/ACT inhaler INHALE 2 PUFFS BY MOUTH EVERY 4 HOURS AS NEEDED FOR WHEEZING 3 Inhaler 1    clindamycin (CLEOCIN T) 1 % external solution Apply to affected area BID prn flares [id number], pt prefers this one 60 mL 3    albuterol sulfate  (90 Base) MCG/ACT inhaler Inhale 2 puffs into the lungs every 4 hours as needed for Wheezing or Shortness of Breath (or cough) 1 Inhaler 4    Psyllium (METAMUCIL PO) Take by mouth      Spacer/Aero-Holding Chambers (E-Z SPACER) CONCEPCION 1 Device by Does not apply route daily as needed 1 Device 0    Omega-3 Fatty Acids (FISH OIL PO) Take by mouth      Multiple Vitamins-Minerals (PRESERVISION AREDS 2) CAPS Take by mouth      Vitamin D (CHOLECALCIFEROL) 1000 UNITS CAPS capsule Take 1,000 Units by mouth daily      polyethyl glycol-propyl glycol 0.4-0.3 % (SYSTANE) 0.4-0.3 % ophthalmic solution 1 drop as needed for Dry Eyes.  Multiple Vitamins-Minerals (CENTRUM SILVER) TABS Take  by mouth daily.  cetirizine (ZYRTEC ALLERGY) 10 MG tablet Take 10 mg by mouth daily.  Calcium Carbonate-Vitamin D (CALTRATE 600+D PO) Take  by mouth.  aspirin 81 MG chewable tablet Take 81 mg by mouth daily. No current facility-administered medications for this visit. Review of Systems:  · Constitutional: No unanticipated weight loss. There's been no change in energy level, sleep pattern, or activity level. No fevers, chills. · Eyes: No visual changes or diplopia. No scleral icterus. · ENT: No Headaches, hearing loss or vertigo. No mouth sores or sore throat. · Cardiovascular: as reviewed in HPI  · Respiratory: No cough or wheezing, no sputum production. No hemoptysis. · Gastrointestinal: No abdominal pain, appetite loss, blood in stools. No change in bowel or bladder habits. · Genitourinary: No dysuria, trouble voiding, or hematuria. · Musculoskeletal:  No gait disturbance, no joint complaints. · Integumentary: No rash or pruritis. · Neurological: No headache, diplopia, change in muscle strength, numbness or tingling. · Psychiatric: No anxiety or depression. · Endocrine: No temperature intolerance. No excessive thirst, fluid intake, or urination. No tremor. · Hematologic/Lymphatic: No abnormal bruising or bleeding, blood clots or swollen lymph nodes. · Allergic/Immunologic: No nasal congestion or hives. Physical Exam:   BP (!) 156/86   Temp 97.7 °F (36.5 °C)   Ht 5' 4\" (1.626 m)   Wt 139 lb 9.6 oz (63.3 kg) Comment: with shoes  SpO2 98%   BMI 23.96 kg/m²   Wt Readings from Last 3 Encounters:   09/29/20 139 lb 9.6 oz (63.3 kg)   09/21/20 140 lb (63.5 kg)   06/17/20 141 lb (64 kg)     Constitutional: She is oriented to person, place, and time.  She appears well-developed and well-nourished. In no acute distress. Head: Normocephalic and atraumatic. Pupils equal and round. Neck: Neck supple. No JVP or carotid bruit appreciated. No mass and no thyromegaly present. No lymphadenopathy present. Cardiovascular: Normal rate. Normal heart sounds. Exam reveals no gallop and no friction rub. No murmur heard. Pulmonary/Chest: Effort normal and breath sounds normal. No respiratory distress. She has no wheezes, rhonchi or rales. Abdominal: Soft, non-tender. Bowel sounds are normal. She exhibits no organomegaly, mass or bruit. Extremities: No edema. No cyanosis or clubbing. Pulses are 2+ radial/carotid bilaterally. Neurological: No gross cranial nerve deficit. Coordination normal.   Skin: Skin is warm and dry. There is no rash or diaphoresis. Psychiatric: She has a normal mood and affect. Her speech is normal and behavior is normal.     Lab Review:   FLP:    Lab Results   Component Value Date    TRIG 36 07/10/2020     07/10/2020    LDLCALC 98 07/10/2020    LABVLDL 7 07/10/2020     BUN/Creatinine:    Lab Results   Component Value Date    BUN 10 09/21/2020    CREATININE <0.5 09/21/2020     EKG Interpretation: 9/21/20 Sinus rhythm. Incomplete RBBB. Possible left atrial enlargement. Image Review:  Echo 12/1999  Technically adequate study which demonstrates normal left ventricular size and contractility with no segmental abnormalities and a left ventricular ejection fraction of approximately 60%.     Borderline right ventricular enlargement. Mitral valve prolapse. Trivial mitral and tricuspid insufficiency.     Echo 6/11/15  Normal LV size and systolic function: EF is 33%. Grade I diastolic dysfunction. Moderate annular calcification with trace to mild mitral regurgitation is present. Very mild prolapse of the posterior leaflet is noted. Trace aortic regurgitation is present. There is trace tricuspid regurgitation with RVSP estimated at 26 mmHg.   Trivial pulmonic regurgitation present. Stress test 10/1/18   Normal myocardial perfusion study. Normal LV size and systolic function. Overall findings represent a low risk study. Assessment/Plan:   1) Syncope and collapse. She reports neurologist felt the event may have been related to a migraine. Discussed completing an echocardiogram or wearing an event monitor but description of event does not sound classic for cardiac syncope. She would prefer to defer testing which seems reasonable. Instructed to call with recurrent symptoms to call the office and can arrange for further testing. 2) Elevated blood pressure. Denies a diagnosis of HTN. Encouraged to follow up with primary care physician. Follow up on an as needed basis. Thank you very much for allowing me to participate in the care of your patient. Please do not hesitate to contact me if you have any questions. Sincerely,  Gian Verdugo. Danika Harris, [address] [place] [address]  Ph: [phone]  Fax: [phone]    This note was scribed in the presence of Dr Danika Harris MD by Mele Hart RN. Physician Attestation: The scribes documentation has been prepared under my direction and personally reviewed by me in its entirety. I confirm that the note above accurately reflects all work, treatment, procedures, and medical decision making performed by me. All portions of the note including but not limited to the chief complaint, history of present illness, physical exam, assessment and plan/medical decision making were personally reviewed, edited, and updated on the day of the visit.

## 2020-09-29 ENCOUNTER — OFFICE VISIT (OUTPATIENT)
Dept: CARDIOLOGY CLINIC | Age: 75
End: 2020-09-29
Payer: COMMERCIAL

## 2020-09-29 VITALS
HEIGHT: 64 IN | SYSTOLIC BLOOD PRESSURE: 156 MMHG | WEIGHT: 139.6 LBS | DIASTOLIC BLOOD PRESSURE: 86 MMHG | OXYGEN SATURATION: 98 % | BODY MASS INDEX: 23.83 KG/M2 | TEMPERATURE: 97.7 F

## 2020-09-29 PROCEDURE — 99204 OFFICE O/P NEW MOD 45 MIN: CPT | Performed by: INTERNAL MEDICINE

## 2020-10-20 ENCOUNTER — OFFICE VISIT (OUTPATIENT)
Dept: DERMATOLOGY | Age: 75
End: 2020-10-20
Payer: COMMERCIAL

## 2020-10-20 VITALS — TEMPERATURE: 97.2 F

## 2020-10-20 PROCEDURE — 17000 DESTRUCT PREMALG LESION: CPT | Performed by: DERMATOLOGY

## 2020-10-20 PROCEDURE — 17003 DESTRUCT PREMALG LES 2-14: CPT | Performed by: DERMATOLOGY

## 2020-10-20 PROCEDURE — 99214 OFFICE O/P EST MOD 30 MIN: CPT | Performed by: DERMATOLOGY

## 2020-10-20 RX ORDER — CLINDAMYCIN PHOSPHATE 11.9 MG/ML
SOLUTION TOPICAL
Qty: 60 ML | Refills: 3 | Status: SHIPPED | OUTPATIENT
Start: 2020-10-20 | End: 2021-11-04 | Stop reason: SDUPTHER

## 2020-10-20 NOTE — PATIENT INSTRUCTIONS
Cryosurgery (Freezing) Wound Care Instructions    AFTER THE PROCEDURE:    You will notice swelling and redness around the site. This is normal.    You may experience a sharp or sore feeling for the next several days. For this discomfort, you may take acetaminophen (Tylenol©).  A blister may develop at the treated area, sometimes as soon as by the end of the day. After several days, the blister will subside and a scab will form.  If the area is bumped or traumatized during the first few days following freezing, you may develop bleeding into the blister, forming a blood blister. This is nothing to be alarmed about.  If the blister is tense, uncomfortable, or much larger than the site that was frozen, you may pop the blister along its edge with a sterile needle (boiled, heated under a flame, or cleaned with alcohol) to allow the fluid to drain out. If the blister does not bother you, no treatment is needed.  Do NOT peel off the top of the blister roof. It will act as a dressing on top of your wound. WOUND CARE:    You may shower or bathe as usual, but avoid scrubbing the areas that have been frozen.  Cleanse the site twice a day with mild soapy water, and then apply a thin film of white petrolatum (Vaseline©).  You do not need to cover the area, but can if you prefer.  Do NOT allow the site to become dry or crusted, or attempt to dry it out with rubbing alcohol or hydrogen peroxide.  Continue this regimen until the area is pink and healed. Depending on the size and location of your cryosurgery site, healing may take 2 to 4 weeks.  The area may continue to be pink for several weeks, and over the next few months may become darker or lighter than the surrounding skin. This may be a permanent change. Protecting Yourself From the Sun    · Apply broad spectrum water resistant sunscreen with an SPF of at least 30 to exposed areas of the skin. Dont forget the ears and lips!  Remember to reapply sunscreen about every 2 hours and after swimming or sweating. · Wear sun protective clothing. Swim shirts (aka. rash guards) are a great idea and negates the need to reapply sunscreen in those areas.      · Seek the shade whenever possible especially between the hours of 10 am and 4 pm when the suns rays are the strongest.     · Avoid tanning beds  ·

## 2020-10-20 NOTE — PROGRESS NOTES
[facility]  Rolanda Salazar MD  [phone]      Marisol Sherman  1945    67 y.o. female     Date of Visit: 10/20/2020    Chief Complaint: moles, f/u AK's  Chief Complaint   Patient presents with    Skin Lesion     FSE     HX: AK, NMSC     Last seen:   she previously had seen Dr. Devin Huertas and Dr. Christina Mari at [facility]    History of Present Illness:    1. Here for evaluation of multiple asx pigmented lesions on the trunk and extremities, present for many years; no change in size/shape/color of any lesions; no bleeding lesions. 2. She has a history of AK's. She has a few new rough lesions on R shoulder and face. Asx. She has had previous lesions treated with efudex and cryotherapy. *R medial lower FH - AK, diffusely transected at deep margin   *L upper lateral FH - Hypertrophic actinic keratosis, diffusely transected at deep margin      Treated both with efudex for 4 weeks; completed treatment around the end of November 2019. Had a lot of inflammation, crusting and focal bleeding with treatment but healed well. Asymptomatic now but focally rough again on the L upper FH. 3. F/u scalp folliculitis  - intermittent itchy lesions on the scalp (going on for several years) -Theramycin (erythromycin) helped some in the past and she has used topical clinda more recently. No triggers noted. Well controlled overall with use of clinda. Flaring mildly recently. 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 Skin: No changing moles or lesions. Past Medical History, Family History, Surgical History, Medications and Allergies reviewed. Past Medical History:   Diagnosis Date    Actinic keratosis     goes to derm annually    Cataract mild    Diverticulosis     Dry eye     Hyperlipidemia     Macular degeneration     dr Jackelyn Blandon Mitral valve prolapse     diagnosed [facility] early 1990s.   asymptomatic    Osteopenia  RAD (reactive airway disease)     treated by dr Luisana Noguera.  Seasonal allergies        Past Surgical History:   Procedure Laterality Date    APPENDECTOMY  1948    DILATION AND CURETTAGE OF UTERUS      1979    EYE SURGERY      to remove ptyergium    COCO AND BSO  1989    secondary to fibroids    TUBAL LIGATION  1979       Outpatient Medications Marked as Taking for the 10/20/20 encounter (Office Visit) with Suzy Lezama MD   Medication Sig Dispense Refill    famotidine (PEPCID) 20 MG tablet Take 20 mg by mouth daily as needed      ibuprofen (ADVIL;MOTRIN) 200 MG tablet Take 200 mg by mouth every 6 hours as needed for Pain      Pseudoephedrine HCl (SUDAFED 12 HOUR PO) Take by mouth      fluticasone (FLOVENT HFA) 110 MCG/ACT inhaler INHALE 2 PUFFS BY MOUTH TWICE DAILY 3 Inhaler 1    azelastine (ASTELIN) 0.1 % nasal spray 1 spray by Nasal route 2 times daily Use in each nostril as directed 1 Bottle 5    montelukast (SINGULAIR) 10 MG tablet TAKE 1 TABLET BY MOUTH EVERY NIGHT 90 tablet 5    albuterol sulfate  (90 Base) MCG/ACT inhaler INHALE 2 PUFFS BY MOUTH EVERY 4 HOURS AS NEEDED FOR WHEEZING 3 Inhaler 1    clindamycin (CLEOCIN T) 1 % external solution Apply to affected area BID prn flares NDC:[address]-09, pt prefers this one 60 mL 3    albuterol sulfate  (90 Base) MCG/ACT inhaler Inhale 2 puffs into the lungs every 4 hours as needed for Wheezing or Shortness of Breath (or cough) 1 Inhaler 4    Psyllium (METAMUCIL PO) Take by mouth      Spacer/Aero-Holding Chambers (E-Z SPACER) CONCEPCION 1 Device by Does not apply route daily as needed 1 Device 0    Omega-3 Fatty Acids (FISH OIL PO) Take by mouth      Multiple Vitamins-Minerals (PRESERVISION AREDS 2) CAPS Take by mouth      Vitamin D (CHOLECALCIFEROL) 1000 UNITS CAPS capsule Take 1,000 Units by mouth daily      polyethyl glycol-propyl glycol 0.4-0.3 % (SYSTANE) 0.4-0.3 % ophthalmic solution 1 drop as needed for Dry Eyes.       Multiple Vitamins-Minerals (CENTRUM SILVER) TABS Take  by mouth daily.  cetirizine (ZYRTEC ALLERGY) 10 MG tablet Take 10 mg by mouth daily.  Calcium Carbonate-Vitamin D (CALTRATE 600+D PO) Take  by mouth.  aspirin 81 MG chewable tablet Take 81 mg by mouth daily. No Known Allergies      Physical Examination     Gen, well-appearing  The following were examined and determined to be normal: Psych/Neuro, Conjunctivae/eyelids, Gums/teeth/lips, Neck, Breast/axilla/chest, Abdomen, Back, RUE, LUE, RLE, LLE, Nails/digits and buttocks. Scalp/hair  The following were examined and determined to be abnormal: Head/face, RUE, LUE  trunk and extremities with scattered brown macules and papules   R shoulder and FH with erythematous roughly scaled macules  Scalp with few crusted small excoriations                    Assessment and Plan     1. Benign-appearing nevi  - educ re ABCD's of MM   educ sun protection   encouraged skin check yearly (sooner if indicated), self checks    2  AK's - R shoulder, FH x 5  - 6 lesion(s) treated with liquid nitrogen x 2 cycles. Patient educated on risk of blister, hypopigmentation/scar and wound care. *R medial lower FH - AK, s/p 5-FU- appears clear  *L upper lateral FH - Hypertrophic actinic keratosis, s/p 5-FU, focally recurrent     Treated both with efudex for 4 weeks; completed treatment around the end of November 2019. Had a lot of inflammation, crusting and focal bleeding with treatment but have healed well. Asymptomatic now.     3. Scalp Folliculitis and excoriations - minimal today  - clinda lotion/solution (likes the vehicle better)  - consider oral abx if worsening

## 2020-12-12 RX ORDER — TIZANIDINE 4 MG/1
TABLET ORAL
Qty: 12 TABLET | Refills: 0 | Status: SHIPPED | OUTPATIENT
Start: 2020-12-12 | End: 2021-01-13 | Stop reason: SDUPTHER

## 2020-12-17 ENCOUNTER — OFFICE VISIT (OUTPATIENT)
Dept: PULMONOLOGY | Age: 75
End: 2020-12-17
Payer: COMMERCIAL

## 2020-12-17 VITALS
TEMPERATURE: 96.1 F | SYSTOLIC BLOOD PRESSURE: 136 MMHG | DIASTOLIC BLOOD PRESSURE: 84 MMHG | OXYGEN SATURATION: 98 % | HEIGHT: 64 IN | BODY MASS INDEX: 24.59 KG/M2 | RESPIRATION RATE: 16 BRPM | WEIGHT: 144 LBS | HEART RATE: 91 BPM

## 2020-12-17 PROCEDURE — 99213 OFFICE O/P EST LOW 20 MIN: CPT | Performed by: INTERNAL MEDICINE

## 2020-12-17 ASSESSMENT — ASTHMA QUESTIONNAIRES
QUESTION_2 LAST FOUR WEEKS HOW OFTEN HAVE YOU HAD SHORTNESS OF BREATH: 4
QUESTION_5 LAST FOUR WEEKS HOW WOULD YOU RATE YOUR ASTHMA CONTROL: 5
QUESTION_3 LAST FOUR WEEKS HOW OFTEN DID YOUR ASTHMA SYMPTOMS (WHEEZING, COUGHING, SHORTNESS OF BREATH, CHEST TIGHTNESS OR PAIN) WAKE YOU UP AT NIGHT OR EARLIER THAN USUAL IN THE MORNING: 5
QUESTION_4 LAST FOUR WEEKS HOW OFTEN HAVE YOU USED YOUR RESCUE INHALER OR NEBULIZER MEDICATION (SUCH AS ALBUTEROL): 5
QUESTION_1 LAST FOUR WEEKS HOW MUCH OF THE TIME DID YOUR ASTHMA KEEP YOU FROM GETTING AS MUCH DONE AT WORK, SCHOOL OR AT HOME: 5
ACT_TOTALSCORE: 24

## 2020-12-17 NOTE — PROGRESS NOTES
Chief complaint  This is a 67y.o. year old female  who comes to see me with a chief complaint of   Chief Complaint   Patient presents with    Asthma     f/u       HPI  Here with cc on follow up on asthma/RADs. Doing great. No new issues. Flovent bid with prn albuterol    Astelin for rhinitis     Flu shot up to date         Past Medical History:   Diagnosis Date    Actinic keratosis     goes to derm annually    Cataract mild    Diverticulosis     Dry eye     Hyperlipidemia     Macular degeneration     dr Luis Enrique Lazaro Mitral valve prolapse     diagnosed [facility] early 1990s. asymptomatic    Osteopenia     RAD (reactive airway disease)     treated by dr Tasha Krishna.     Seasonal allergies        Past Surgical History:   Procedure Laterality Date    APPENDECTOMY  1948    DILATION AND CURETTAGE OF UTERUS      1979    EYE SURGERY      to remove ptyergium    COCO AND BSO  1989    secondary to fibroids    TUBAL LIGATION  1979       Current Outpatient Medications   Medication Sig Dispense Refill    tiZANidine (ZANAFLEX) 4 MG tablet 1/2 to 1 tid prn muscle spasm 12 tablet 0    clindamycin (CLEOCIN T) 1 % external solution Apply to affected area BID prn flares NDC:[address]-81, pt prefers this one 60 mL 3    famotidine (PEPCID) 20 MG tablet Take 20 mg by mouth daily as needed      ibuprofen (ADVIL;MOTRIN) 200 MG tablet Take 200 mg by mouth every 6 hours as needed for Pain      Pseudoephedrine HCl (SUDAFED 12 HOUR PO) Take by mouth      fluticasone (FLOVENT HFA) 110 MCG/ACT inhaler INHALE 2 PUFFS BY MOUTH TWICE DAILY 3 Inhaler 1    azelastine (ASTELIN) 0.1 % nasal spray 1 spray by Nasal route 2 times daily Use in each nostril as directed 1 Bottle 5    montelukast (SINGULAIR) 10 MG tablet TAKE 1 TABLET BY MOUTH EVERY NIGHT 90 tablet 5    albuterol sulfate  (90 Base) MCG/ACT inhaler INHALE 2 PUFFS BY MOUTH EVERY 4 HOURS AS NEEDED FOR WHEEZING 3 Inhaler 1    albuterol sulfate  (90 Base) achieved at around < 20%   predicted suggestive of muscle deconditioning and or decreased   cardiac out put which is less likely with normal VO 2 pulse. Correlate clinically    Cardiac parameters within normal limits with normal HR response   and oxygen pulse achievement but elevated HRR also suggestive of   some deconditioning. Normal BP response at VO 2 max. EKG normal   with no ischemic changes or cardiac arrhthymias. Correlate   clinically    Ventilatory parameters within normal limits overall with no   obstructive or restrictive lung disease seen. Higher minute   ventilation likely related to some tachypnea but other wise   normal. Ventilatory efficiency for Co 2 and O 2 were within   normal limits with no obvious dead space ventilation and no Co 2   retention post exercise. ABG post exercise normal with normal A-a   gradient. Correlate clinically    Note: Patient stopped test due to legs could not pedal anymore   and Shortness of breath. Lab Results   Component Value Date    WBC 4.9 09/21/2020       No results found for: BNP    Lab Results   Component Value Date    CREATININE <0.5 (L) 09/21/2020        Total IgE Level:  60  Significant allergies:  Cow's milk    ASTHMA CONTROL TEST 12/17/2020 6/17/2020 9/24/2019 3/26/2019 9/25/2018 3/26/2018 9/26/2017   In the past 4 weeks, how much of the time did your asthma keep you from getting as much done at work, school or at home? 5 5 5 5 3 5 4   During the past 4 weeks, how often have you had shortness of breath? 4 4 4 5 3 4 4   During the past 4 weeks, how often did your asthma symptoms (wheezing, coughing, shortness of breath, chest tightness or pain) wake you up at night or earlier than usual in the morning? 5 5 5 5 5 5 5   During the past 4 weeks, how often have you used your rescue inhaler or nebulizer medication (such as albuterol)? 5 4 5 5 2 2 2   How would you rate your asthma control during the past 4 weeks?  5 4 5 5 4 5 4   Asthma Control Test Total Score 24 22 24 25 17 21 19       Assessment/Plan:  1. Reactive airway disease, moderate persistent, uncomplicated  Controlled on flovent and albuterol    2.  Seasonal allergic rhinitis due to other allergic trigger  Controlled on astelin       Flu shot up to date     Follow up in 6 months

## 2020-12-30 RX ORDER — FLUTICASONE PROPIONATE 110 UG/1
AEROSOL, METERED RESPIRATORY (INHALATION)
Qty: 36 G | Refills: 3 | Status: SHIPPED | OUTPATIENT
Start: 2020-12-30 | End: 2022-03-25

## 2021-01-11 ENCOUNTER — TELEPHONE (OUTPATIENT)
Dept: FAMILY MEDICINE CLINIC | Age: 76
End: 2021-01-11

## 2021-01-11 NOTE — TELEPHONE ENCOUNTER
Ok to schedule in a same day visit Wednesday or Friday    Please document call and then close encounter.   thanks

## 2021-01-13 ENCOUNTER — TELEPHONE (OUTPATIENT)
Dept: FAMILY MEDICINE CLINIC | Age: 76
End: 2021-01-13

## 2021-01-13 ENCOUNTER — HOSPITAL ENCOUNTER (OUTPATIENT)
Age: 76
Discharge: HOME | End: 2021-01-13
Payer: COMMERCIAL

## 2021-01-13 ENCOUNTER — OFFICE VISIT (OUTPATIENT)
Dept: FAMILY MEDICINE CLINIC | Age: 76
End: 2021-01-13
Payer: COMMERCIAL

## 2021-01-13 ENCOUNTER — HOSPITAL ENCOUNTER (OUTPATIENT)
Dept: GENERAL RADIOLOGY | Age: 76
Discharge: HOME | End: 2021-01-13
Payer: COMMERCIAL

## 2021-01-13 VITALS
WEIGHT: 142 LBS | SYSTOLIC BLOOD PRESSURE: 128 MMHG | TEMPERATURE: 97.3 F | HEART RATE: 106 BPM | BODY MASS INDEX: 24.24 KG/M2 | HEIGHT: 64 IN | DIASTOLIC BLOOD PRESSURE: 84 MMHG

## 2021-01-13 DIAGNOSIS — M54.50 ACUTE RIGHT-SIDED LOW BACK PAIN WITHOUT SCIATICA: ICD-10-CM

## 2021-01-13 DIAGNOSIS — M25.551 RIGHT HIP PAIN: ICD-10-CM

## 2021-01-13 DIAGNOSIS — Z79.899 MEDICATION MANAGEMENT: ICD-10-CM

## 2021-01-13 DIAGNOSIS — M54.50 ACUTE RIGHT-SIDED LOW BACK PAIN WITHOUT SCIATICA: Primary | ICD-10-CM

## 2021-01-13 DIAGNOSIS — R03.0 TRANSIENT ELEVATED BLOOD PRESSURE: ICD-10-CM

## 2021-01-13 LAB
ANION GAP SERPL CALCULATED.3IONS-SCNC: 13 MMOL/L (ref 3–16)
BUN BLDV-MCNC: 13 MG/DL (ref 7–20)
CALCIUM SERPL-MCNC: 9.5 MG/DL (ref 8.3–10.6)
CHLORIDE BLD-SCNC: 97 MMOL/L (ref 99–110)
CO2: 28 MMOL/L (ref 21–32)
CREAT SERPL-MCNC: <0.5 MG/DL (ref 0.6–1.2)
GFR AFRICAN AMERICAN: >60
GFR NON-AFRICAN AMERICAN: >60
GLUCOSE BLD-MCNC: 84 MG/DL (ref 70–99)
POTASSIUM SERPL-SCNC: 3.9 MMOL/L (ref 3.5–5.1)
SODIUM BLD-SCNC: 138 MMOL/L (ref 136–145)

## 2021-01-13 PROCEDURE — 72100 X-RAY EXAM L-S SPINE 2/3 VWS: CPT

## 2021-01-13 PROCEDURE — 73502 X-RAY EXAM HIP UNI 2-3 VIEWS: CPT

## 2021-01-13 PROCEDURE — 36415 COLL VENOUS BLD VENIPUNCTURE: CPT | Performed by: FAMILY MEDICINE

## 2021-01-13 PROCEDURE — 99214 OFFICE O/P EST MOD 30 MIN: CPT | Performed by: FAMILY MEDICINE

## 2021-01-13 RX ORDER — TIZANIDINE 4 MG/1
TABLET ORAL
Qty: 20 TABLET | Refills: 0 | Status: SHIPPED | OUTPATIENT
Start: 2021-01-13 | End: 2021-04-29 | Stop reason: SDUPTHER

## 2021-01-13 ASSESSMENT — PATIENT HEALTH QUESTIONNAIRE - PHQ9: DEPRESSION UNABLE TO ASSESS: URGENT/EMERGENT SITUATION

## 2021-01-13 NOTE — TELEPHONE ENCOUNTER
Let patient know we can mail them to her. Sherlyn Jon I printed a new AVS with her back instructions on them. Please mail them to her house. Let patient know she should not do any of the exercise that worsen her pain. I also want to make sure her back x-ray shows no signs of fracture before she starts the exercises. Please document call and then close encounter.   thanks

## 2021-01-13 NOTE — PROGRESS NOTES
PROGRESS NOTE     Kassie Huynh MD  [facility]  [address] [place] [address]  [phone] office  [phone] fax    Date of Service:  1/13/2021     Patient ID: Dino Cullen is a 67 y.o. female      Assessment / Plan:     1. Acute right-sided low back pain without sciatica  New diagnosis. Already showing great improvement over the last month, but has new signs of right groin pain. Likely some degenerative disc disease. Starting with x-ray of lumbar spine. Did discuss that this will not show the nerves or other soft tissue but only the bones. I do feel that her right sided groin pain could be an L2 radiculopathy, as her back pain is located around L2, and sounds dermatomal.  - XR LUMBAR SPINE (2-3 VIEWS); Future    Because she is not having any numbness or weakness, no signs of neurologic compromise that would make MRI important at this time. Okay to continue Tylenol and ibuprofen, checking BMP to ensure no kidney insufficiency. Refilling patient's tizanidine to use as needed. 2. Right hip pain  Hip exam was normal, making it more likely that the groin pain is lumbar radiculopathy. Did discuss that hip arthritis does usually present in the groin however. For this reason checking x-ray. - XR HIP RIGHT (2-3 VIEWS); Future    3. Transient elevated blood pressure  Initially elevated, likely due to anxiety. Patient felt much calmer after my discussion with her, and blood pressure returned to normal.                Subjective:     CC: Acute right back pain, with radiation to right groin    HPI      On 12/12/20, felt pain in right lower back when extended back while gargling. At the time right back pain was severe. Pain has slowly lessened in intensity and frequency. Only happening intermittently now, only mild to moderate in nature when it does occur.   She is taking 200 mg of ibuprofen once or twice a day, 500 mg of Tylenol daily, and as needed tizanidine, which was phoned in from on-call doctor. All of these medications are helping. She denies any numbness or weakness in her lower extremities. No saddle anesthesia, no new bowel or bladder issues. The reason she is coming in today for evaluation is she has developed intermittent right groin pain, associated with her right back pain. Pain is sharp in nature, intermittent, and moderate in intensity. It occurs a couple times a week, and last a couple hours at a time. Nothing seems to make the pain better or worse. She has never had pain in her groin before.     Vitals:    01/13/21 1137 01/13/21 1145 01/13/21 1211   BP: (!) 177/83 (!) 162/82 128/84   Pulse: 120  106   Temp: 97.3 °F (36.3 °C)     TempSrc: Infrared     Weight: 142 lb (64.4 kg)     Height: 5' 4\" (1.626 m)         Outpatient Medications Marked as Taking for the 1/13/21 encounter (Office Visit) with Angella Hernández MD   Medication Sig Dispense Refill    tiZANidine (ZANAFLEX) 4 MG tablet 1/2 to 1 tid prn muscle spasm 20 tablet 0    fluticasone (FLOVENT HFA) 110 MCG/ACT inhaler INHALE 2 PUFFS BY MOUTH TWICE DAILY 36 g 3    clindamycin (CLEOCIN T) 1 % external solution Apply to affected area BID prn flares NDC:[address]-21, pt prefers this one 60 mL 3    famotidine (PEPCID) 20 MG tablet Take 20 mg by mouth daily as needed      ibuprofen (ADVIL;MOTRIN) 200 MG tablet Take 200 mg by mouth every 6 hours as needed for Pain      Pseudoephedrine HCl (SUDAFED 12 HOUR PO) Take by mouth      azelastine (ASTELIN) 0.1 % nasal spray 1 spray by Nasal route 2 times daily Use in each nostril as directed 1 Bottle 5    montelukast (SINGULAIR) 10 MG tablet TAKE 1 TABLET BY MOUTH EVERY NIGHT 90 tablet 5    albuterol sulfate  (90 Base) MCG/ACT inhaler INHALE 2 PUFFS BY MOUTH EVERY 4 HOURS AS NEEDED FOR WHEEZING 3 Inhaler 1    albuterol sulfate  (90 Base) MCG/ACT inhaler Inhale 2 puffs into the lungs every 4 hours as needed for Wheezing or palpation  Psych:  · Normal mood and affect  · Normal insight and judgement

## 2021-01-13 NOTE — TELEPHONE ENCOUNTER
PT called in stating that Dr. Kirk Kwon was supposed to give her exercises for her back and she left without getting them. PT would like to know if she needs to come back out to get them or what she should do.      Please call PT back: [phone]

## 2021-01-13 NOTE — PATIENT INSTRUCTIONS
Patient Education        Low Back Pain: Exercises  Introduction  Here are some examples of exercises for you to try. The exercises may be suggested for a condition or for rehabilitation. Start each exercise slowly. Ease off the exercises if you start to have pain. You will be told when to start these exercises and which ones will work best for you. How to do the exercises  Press-up   1. Lie on your stomach, supporting your body with your forearms. 2. Press your elbows down into the floor to raise your upper back. As you do this, relax your stomach muscles and allow your back to arch without using your back muscles. As your press up, do not let your hips or pelvis come off the floor. 3. Hold for 15 to 30 seconds, then relax. 4. Repeat 2 to 4 times. Alternate arm and leg (bird dog) exercise   Do this exercise slowly. Try to keep your body straight at all times, and do not let one hip drop lower than the other. 1. Start on the floor, on your hands and knees. 2. Tighten your belly muscles. 3. Raise one leg off the floor, and hold it straight out behind you. Be careful not to let your hip drop down, because that will twist your trunk. 4. Hold for about 6 seconds, then lower your leg and switch to the other leg. 5. Repeat 8 to 12 times on each leg. 6. Over time, work up to holding for 10 to 30 seconds each time. 7. If you feel stable and secure with your leg raised, try raising the opposite arm straight out in front of you at the same time. Knee-to-chest exercise   1. Lie on your back with your knees bent and your feet flat on the floor. 2. Bring one knee to your chest, keeping the other foot flat on the floor (or keeping the other leg straight, whichever feels better on your lower back). 3. Keep your lower back pressed to the floor. Hold for at least 15 to 30 seconds. 4. Relax, and lower the knee to the starting position. 5. Repeat with the other leg. Repeat 2 to 4 times with each leg.   6. To get more stretch, put your other leg flat on the floor while pulling your knee to your chest.    Curl-ups   1. Lie on the floor on your back with your knees bent at a 90-degree angle. Your feet should be flat on the floor, about 12 inches from your buttocks. 2. Cross your arms over your chest. If this bothers your neck, try putting your hands behind your neck (not your head), with your elbows spread apart. 3. Slowly tighten your belly muscles and raise your shoulder blades off the floor. 4. Keep your head in line with your body, and do not press your chin to your chest.  5. Hold this position for 1 or 2 seconds, then slowly lower yourself back down to the floor. 6. Repeat 8 to 12 times. Pelvic tilt exercise   1. Lie on your back with your knees bent. 2. \"Brace\" your stomach. This means to tighten your muscles by pulling in and imagining your belly button moving toward your spine. You should feel like your back is pressing to the floor and your hips and pelvis are rocking back. 3. Hold for about 6 seconds while you breathe smoothly. 4. Repeat 8 to 12 times. Heel dig bridging   1. Lie on your back with both knees bent and your ankles bent so that only your heels are digging into the floor. Your knees should be bent about 90 degrees. 2. Then push your heels into the floor, squeeze your buttocks, and lift your hips off the floor until your shoulders, hips, and knees are all in a straight line. 3. Hold for about 6 seconds as you continue to breathe normally, and then slowly lower your hips back down to the floor and rest for up to 10 seconds. 4. Do 8 to 12 repetitions. Hamstring stretch in doorway   1. Lie on your back in a doorway, with one leg through the open door. 2. Slide your leg up the wall to straighten your knee. You should feel a gentle stretch down the back of your leg. 3. Hold the stretch for at least 15 to 30 seconds. Do not arch your back, point your toes, or bend either knee.  Keep one heel touching the floor and the other heel touching the wall. 4. Repeat with your other leg. 5. Do 2 to 4 times for each leg. Hip flexor stretch   1. Kneel on the floor with one knee bent and one leg behind you. Place your forward knee over your foot. Keep your other knee touching the floor. 2. Slowly push your hips forward until you feel a stretch in the upper thigh of your rear leg. 3. Hold the stretch for at least 15 to 30 seconds. Repeat with your other leg. 4. Do 2 to 4 times on each side. Wall sit   1. Stand with your back 10 to 12 inches away from a wall. 2. Lean into the wall until your back is flat against it. 3. Slowly slide down until your knees are slightly bent, pressing your lower back into the wall. 4. Hold for about 6 seconds, then slide back up the wall. 5. Repeat 8 to 12 times. Follow-up care is a key part of your treatment and safety. Be sure to make and go to all appointments, and call your doctor if you are having problems. It's also a good idea to know your test results and keep a list of the medicines you take. Where can you learn more? Go to [email or web address] and sign in to your [employer] account. Enter Y322 in the [employer] box to learn more about \"Low Back Pain: Exercises. \"     If you do not have an account, please click on the \"Sign Up Now\" link. Current as of: March 2, 2020               Content Version: 12.6  © 2006-2020 [employer]. Care instructions adapted under license by [facility] ([facility]). If you have questions about a medical condition or this instruction, always ask your healthcare professional. [place] [address] any warranty or liability for your use of this information.

## 2021-02-01 ENCOUNTER — TELEPHONE (OUTPATIENT)
Dept: FAMILY MEDICINE CLINIC | Age: 76
End: 2021-02-01

## 2021-02-01 NOTE — TELEPHONE ENCOUNTER
----- Message from [facility] sent at 2/1/2021  9:57 AM EST -----  Subject: Message to Provider    QUESTIONS  Information for Provider? Requesting a call from front office to switch   providers please follow up to advise   ---------------------------------------------------------------------------  --------------  CALL BACK INFO  What is the best way for the office to contact you? OK to leave message on   voicemail  Preferred Call Back Phone Number? [phone]  ---------------------------------------------------------------------------  --------------  SCRIPT ANSWERS  Relationship to Patient?  Self

## 2021-02-08 DIAGNOSIS — J45.40 REACTIVE AIRWAY DISEASE, MODERATE PERSISTENT, UNCOMPLICATED: ICD-10-CM

## 2021-02-08 RX ORDER — MONTELUKAST SODIUM 10 MG/1
10 TABLET ORAL NIGHTLY
Qty: 90 TABLET | Refills: 3 | Status: SHIPPED | OUTPATIENT
Start: 2021-02-08 | End: 2022-04-12

## 2021-04-11 ENCOUNTER — HOSPITAL ENCOUNTER (INPATIENT)
Age: 76
LOS: 2 days | Discharge: HOME | DRG: 390 | End: 2021-04-14
Attending: SURGERY | Admitting: SURGERY
Payer: COMMERCIAL

## 2021-04-11 ENCOUNTER — APPOINTMENT (OUTPATIENT)
Dept: CT IMAGING | Age: 76
DRG: 390 | End: 2021-04-11
Payer: COMMERCIAL

## 2021-04-11 DIAGNOSIS — K56.609 SBO (SMALL BOWEL OBSTRUCTION) (HCC): Primary | ICD-10-CM

## 2021-04-11 LAB
BASOPHILS ABSOLUTE: 0 K/UL (ref 0–0.2)
BASOPHILS RELATIVE PERCENT: 0.5 %
EOSINOPHILS ABSOLUTE: 0 K/UL (ref 0–0.6)
EOSINOPHILS RELATIVE PERCENT: 0.4 %
HCT VFR BLD CALC: 39.8 % (ref 36–48)
HEMOGLOBIN: 13.6 G/DL (ref 12–16)
LYMPHOCYTES ABSOLUTE: 0.9 K/UL (ref 1–5.1)
LYMPHOCYTES RELATIVE PERCENT: 10 %
MCH RBC QN AUTO: 29 PG (ref 26–34)
MCHC RBC AUTO-ENTMCNC: 34 G/DL (ref 31–36)
MCV RBC AUTO: 85.2 FL (ref 80–100)
MONOCYTES ABSOLUTE: 0.5 K/UL (ref 0–1.3)
MONOCYTES RELATIVE PERCENT: 5.9 %
NEUTROPHILS ABSOLUTE: 7.4 K/UL (ref 1.7–7.7)
NEUTROPHILS RELATIVE PERCENT: 83.2 %
PDW BLD-RTO: 13.8 % (ref 12.4–15.4)
PLATELET # BLD: 241 K/UL (ref 135–450)
PMV BLD AUTO: 7.1 FL (ref 5–10.5)
RBC # BLD: 4.67 M/UL (ref 4–5.2)
WBC # BLD: 8.9 K/UL (ref 4–11)

## 2021-04-11 PROCEDURE — 85025 COMPLETE CBC W/AUTO DIFF WBC: CPT

## 2021-04-11 PROCEDURE — 96375 TX/PRO/DX INJ NEW DRUG ADDON: CPT

## 2021-04-11 PROCEDURE — 96374 THER/PROPH/DIAG INJ IV PUSH: CPT

## 2021-04-11 PROCEDURE — 83690 ASSAY OF LIPASE: CPT

## 2021-04-11 PROCEDURE — 96376 TX/PRO/DX INJ SAME DRUG ADON: CPT

## 2021-04-11 PROCEDURE — 84484 ASSAY OF TROPONIN QUANT: CPT

## 2021-04-11 PROCEDURE — 93005 ELECTROCARDIOGRAM TRACING: CPT | Performed by: NURSE PRACTITIONER

## 2021-04-11 PROCEDURE — 83735 ASSAY OF MAGNESIUM: CPT

## 2021-04-11 PROCEDURE — 99284 EMERGENCY DEPT VISIT MOD MDM: CPT

## 2021-04-11 PROCEDURE — 83605 ASSAY OF LACTIC ACID: CPT

## 2021-04-11 PROCEDURE — 80053 COMPREHEN METABOLIC PANEL: CPT

## 2021-04-11 PROCEDURE — 36415 COLL VENOUS BLD VENIPUNCTURE: CPT

## 2021-04-11 PROCEDURE — 6360000002 HC RX W HCPCS: Performed by: NURSE PRACTITIONER

## 2021-04-11 PROCEDURE — 2580000003 HC RX 258: Performed by: NURSE PRACTITIONER

## 2021-04-11 RX ORDER — 0.9 % SODIUM CHLORIDE 0.9 %
1000 INTRAVENOUS SOLUTION INTRAVENOUS ONCE
Status: COMPLETED | OUTPATIENT
Start: 2021-04-11 | End: 2021-04-12

## 2021-04-11 RX ORDER — ONDANSETRON 2 MG/ML
4 INJECTION INTRAMUSCULAR; INTRAVENOUS ONCE
Status: COMPLETED | OUTPATIENT
Start: 2021-04-11 | End: 2021-04-11

## 2021-04-11 RX ORDER — MORPHINE SULFATE 4 MG/ML
4 INJECTION, SOLUTION INTRAMUSCULAR; INTRAVENOUS ONCE
Status: COMPLETED | OUTPATIENT
Start: 2021-04-11 | End: 2021-04-11

## 2021-04-11 RX ADMIN — MORPHINE SULFATE 4 MG: 4 INJECTION, SOLUTION INTRAMUSCULAR; INTRAVENOUS at 23:40

## 2021-04-11 RX ADMIN — SODIUM CHLORIDE 1000 ML: 9 INJECTION, SOLUTION INTRAVENOUS at 23:40

## 2021-04-11 RX ADMIN — ONDANSETRON 4 MG: 2 INJECTION INTRAMUSCULAR; INTRAVENOUS at 23:40

## 2021-04-11 ASSESSMENT — PAIN SCALES - GENERAL: PAINLEVEL_OUTOF10: 8

## 2021-04-11 ASSESSMENT — ENCOUNTER SYMPTOMS
NAUSEA: 1
TROUBLE SWALLOWING: 0
CONSTIPATION: 0
ABDOMINAL PAIN: 1
SHORTNESS OF BREATH: 0
COLOR CHANGE: 0
DIARRHEA: 0
VOMITING: 1
ABDOMINAL DISTENTION: 0
BLOOD IN STOOL: 0
BACK PAIN: 0
COUGH: 0

## 2021-04-11 ASSESSMENT — PAIN DESCRIPTION - ORIENTATION: ORIENTATION: UPPER

## 2021-04-12 ENCOUNTER — APPOINTMENT (OUTPATIENT)
Dept: CT IMAGING | Age: 76
DRG: 390 | End: 2021-04-12
Payer: COMMERCIAL

## 2021-04-12 PROBLEM — K56.609 SBO (SMALL BOWEL OBSTRUCTION) (HCC): Status: ACTIVE | Noted: 2021-04-12

## 2021-04-12 LAB
A/G RATIO: 1.7 (ref 1.1–2.2)
ALBUMIN SERPL-MCNC: 4.4 G/DL (ref 3.4–5)
ALP BLD-CCNC: 102 U/L (ref 40–129)
ALT SERPL-CCNC: 23 U/L (ref 10–40)
ANION GAP SERPL CALCULATED.3IONS-SCNC: 11 MMOL/L (ref 3–16)
ANION GAP SERPL CALCULATED.3IONS-SCNC: 13 MMOL/L (ref 3–16)
AST SERPL-CCNC: 25 U/L (ref 15–37)
BILIRUB SERPL-MCNC: 0.3 MG/DL (ref 0–1)
BILIRUBIN URINE: NEGATIVE
BLOOD, URINE: NEGATIVE
BUN BLDV-MCNC: 12 MG/DL (ref 7–20)
BUN BLDV-MCNC: 9 MG/DL (ref 7–20)
CALCIUM SERPL-MCNC: 9.1 MG/DL (ref 8.3–10.6)
CALCIUM SERPL-MCNC: 9.6 MG/DL (ref 8.3–10.6)
CHLORIDE BLD-SCNC: 102 MMOL/L (ref 99–110)
CHLORIDE BLD-SCNC: 99 MMOL/L (ref 99–110)
CLARITY: CLEAR
CO2: 25 MMOL/L (ref 21–32)
CO2: 25 MMOL/L (ref 21–32)
COLOR: YELLOW
CREAT SERPL-MCNC: <0.5 MG/DL (ref 0.6–1.2)
CREAT SERPL-MCNC: <0.5 MG/DL (ref 0.6–1.2)
EKG ATRIAL RATE: 72 BPM
EKG DIAGNOSIS: NORMAL
EKG P AXIS: 67 DEGREES
EKG P-R INTERVAL: 156 MS
EKG Q-T INTERVAL: 404 MS
EKG QRS DURATION: 80 MS
EKG QTC CALCULATION (BAZETT): 442 MS
EKG R AXIS: 57 DEGREES
EKG T AXIS: 59 DEGREES
EKG VENTRICULAR RATE: 72 BPM
GFR AFRICAN AMERICAN: >60
GFR AFRICAN AMERICAN: >60
GFR NON-AFRICAN AMERICAN: >60
GFR NON-AFRICAN AMERICAN: >60
GLOBULIN: 2.6 G/DL
GLUCOSE BLD-MCNC: 129 MG/DL (ref 70–99)
GLUCOSE BLD-MCNC: 130 MG/DL (ref 70–99)
GLUCOSE URINE: NEGATIVE MG/DL
HCT VFR BLD CALC: 36.5 % (ref 36–48)
HEMOGLOBIN: 12.4 G/DL (ref 12–16)
KETONES, URINE: 40 MG/DL
LACTIC ACID: 1.3 MMOL/L (ref 0.4–2)
LEUKOCYTE ESTERASE, URINE: NEGATIVE
LIPASE: 32 U/L (ref 13–60)
MAGNESIUM: 2.3 MG/DL (ref 1.8–2.4)
MCH RBC QN AUTO: 29 PG (ref 26–34)
MCHC RBC AUTO-ENTMCNC: 34.1 G/DL (ref 31–36)
MCV RBC AUTO: 85.2 FL (ref 80–100)
MICROSCOPIC EXAMINATION: ABNORMAL
NITRITE, URINE: NEGATIVE
PDW BLD-RTO: 13.7 % (ref 12.4–15.4)
PH UA: 8.5 (ref 5–8)
PLATELET # BLD: 212 K/UL (ref 135–450)
PMV BLD AUTO: 7.4 FL (ref 5–10.5)
POTASSIUM REFLEX MAGNESIUM: 3.3 MMOL/L (ref 3.5–5.1)
POTASSIUM SERPL-SCNC: 3.6 MMOL/L (ref 3.5–5.1)
PROTEIN UA: NEGATIVE MG/DL
RBC # BLD: 4.29 M/UL (ref 4–5.2)
SODIUM BLD-SCNC: 137 MMOL/L (ref 136–145)
SODIUM BLD-SCNC: 138 MMOL/L (ref 136–145)
SPECIFIC GRAVITY UA: >1.03 (ref 1–1.03)
TOTAL PROTEIN: 7 G/DL (ref 6.4–8.2)
TROPONIN: <0.01 NG/ML
URINE REFLEX TO CULTURE: ABNORMAL
URINE TYPE: ABNORMAL
UROBILINOGEN, URINE: 0.2 E.U./DL
WBC # BLD: 7.8 K/UL (ref 4–11)

## 2021-04-12 PROCEDURE — G0378 HOSPITAL OBSERVATION PER HR: HCPCS

## 2021-04-12 PROCEDURE — APPNB180 APP NON BILLABLE TIME > 60 MINS: Performed by: PHYSICIAN ASSISTANT

## 2021-04-12 PROCEDURE — 96372 THER/PROPH/DIAG INJ SC/IM: CPT

## 2021-04-12 PROCEDURE — 93010 ELECTROCARDIOGRAM REPORT: CPT | Performed by: INTERNAL MEDICINE

## 2021-04-12 PROCEDURE — 99222 1ST HOSP IP/OBS MODERATE 55: CPT | Performed by: SURGERY

## 2021-04-12 PROCEDURE — 74177 CT ABD & PELVIS W/CONTRAST: CPT

## 2021-04-12 PROCEDURE — 6360000004 HC RX CONTRAST MEDICATION: Performed by: NURSE PRACTITIONER

## 2021-04-12 PROCEDURE — 96376 TX/PRO/DX INJ SAME DRUG ADON: CPT

## 2021-04-12 PROCEDURE — 94760 N-INVAS EAR/PLS OXIMETRY 1: CPT

## 2021-04-12 PROCEDURE — 6360000002 HC RX W HCPCS: Performed by: SURGERY

## 2021-04-12 PROCEDURE — APPSS180 APP SPLIT SHARED TIME > 60 MINUTES: Performed by: PHYSICIAN ASSISTANT

## 2021-04-12 PROCEDURE — 2500000003 HC RX 250 WO HCPCS: Performed by: SURGERY

## 2021-04-12 PROCEDURE — 85027 COMPLETE CBC AUTOMATED: CPT

## 2021-04-12 PROCEDURE — 36415 COLL VENOUS BLD VENIPUNCTURE: CPT

## 2021-04-12 PROCEDURE — 94640 AIRWAY INHALATION TREATMENT: CPT

## 2021-04-12 PROCEDURE — 81003 URINALYSIS AUTO W/O SCOPE: CPT

## 2021-04-12 PROCEDURE — 6360000002 HC RX W HCPCS: Performed by: NURSE PRACTITIONER

## 2021-04-12 PROCEDURE — 2580000003 HC RX 258: Performed by: SURGERY

## 2021-04-12 PROCEDURE — 1200000000 HC SEMI PRIVATE

## 2021-04-12 PROCEDURE — 96375 TX/PRO/DX INJ NEW DRUG ADDON: CPT

## 2021-04-12 PROCEDURE — 6370000000 HC RX 637 (ALT 250 FOR IP): Performed by: PHYSICIAN ASSISTANT

## 2021-04-12 PROCEDURE — 2500000003 HC RX 250 WO HCPCS: Performed by: NURSE PRACTITIONER

## 2021-04-12 PROCEDURE — 6370000000 HC RX 637 (ALT 250 FOR IP): Performed by: SURGERY

## 2021-04-12 PROCEDURE — 80048 BASIC METABOLIC PNL TOTAL CA: CPT

## 2021-04-12 RX ORDER — ASPIRIN 81 MG/1
81 TABLET, CHEWABLE ORAL DAILY
Status: DISCONTINUED | OUTPATIENT
Start: 2021-04-13 | End: 2021-04-14 | Stop reason: HOSPADM

## 2021-04-12 RX ORDER — MONTELUKAST SODIUM 10 MG/1
10 TABLET ORAL NIGHTLY
Status: DISCONTINUED | OUTPATIENT
Start: 2021-04-12 | End: 2021-04-14 | Stop reason: HOSPADM

## 2021-04-12 RX ORDER — LIDOCAINE HYDROCHLORIDE 20 MG/ML
15 SOLUTION OROPHARYNGEAL ONCE
Status: DISCONTINUED | OUTPATIENT
Start: 2021-04-12 | End: 2021-04-12

## 2021-04-12 RX ORDER — SODIUM CHLORIDE 0.9 % (FLUSH) 0.9 %
5-40 SYRINGE (ML) INJECTION PRN
Status: DISCONTINUED | OUTPATIENT
Start: 2021-04-12 | End: 2021-04-14 | Stop reason: HOSPADM

## 2021-04-12 RX ORDER — MORPHINE SULFATE 4 MG/ML
4 INJECTION, SOLUTION INTRAMUSCULAR; INTRAVENOUS ONCE
Status: COMPLETED | OUTPATIENT
Start: 2021-04-12 | End: 2021-04-12

## 2021-04-12 RX ORDER — SODIUM CHLORIDE 9 MG/ML
25 INJECTION, SOLUTION INTRAVENOUS PRN
Status: DISCONTINUED | OUTPATIENT
Start: 2021-04-12 | End: 2021-04-14 | Stop reason: HOSPADM

## 2021-04-12 RX ORDER — LIDOCAINE HYDROCHLORIDE 10 MG/ML
5 INJECTION, SOLUTION INFILTRATION; PERINEURAL ONCE
Status: COMPLETED | OUTPATIENT
Start: 2021-04-12 | End: 2021-04-12

## 2021-04-12 RX ORDER — ONDANSETRON 2 MG/ML
4 INJECTION INTRAMUSCULAR; INTRAVENOUS EVERY 6 HOURS PRN
Status: DISCONTINUED | OUTPATIENT
Start: 2021-04-12 | End: 2021-04-14 | Stop reason: HOSPADM

## 2021-04-12 RX ORDER — SODIUM CHLORIDE, SODIUM LACTATE, POTASSIUM CHLORIDE, CALCIUM CHLORIDE 600; 310; 30; 20 MG/100ML; MG/100ML; MG/100ML; MG/100ML
INJECTION, SOLUTION INTRAVENOUS CONTINUOUS
Status: DISCONTINUED | OUTPATIENT
Start: 2021-04-12 | End: 2021-04-14 | Stop reason: HOSPADM

## 2021-04-12 RX ORDER — ALBUTEROL SULFATE 2.5 MG/3ML
2.5 SOLUTION RESPIRATORY (INHALATION) EVERY 4 HOURS PRN
Status: DISCONTINUED | OUTPATIENT
Start: 2021-04-12 | End: 2021-04-14 | Stop reason: HOSPADM

## 2021-04-12 RX ORDER — SODIUM CHLORIDE 0.9 % (FLUSH) 0.9 %
5-40 SYRINGE (ML) INJECTION EVERY 12 HOURS SCHEDULED
Status: DISCONTINUED | OUTPATIENT
Start: 2021-04-12 | End: 2021-04-14 | Stop reason: HOSPADM

## 2021-04-12 RX ORDER — SODIUM CHLORIDE AND POTASSIUM CHLORIDE .9; .15 G/100ML; G/100ML
SOLUTION INTRAVENOUS ONCE
Status: COMPLETED | OUTPATIENT
Start: 2021-04-12 | End: 2021-04-12

## 2021-04-12 RX ORDER — ACETAMINOPHEN 325 MG/1
650 TABLET ORAL EVERY 6 HOURS PRN
Status: DISCONTINUED | OUTPATIENT
Start: 2021-04-12 | End: 2021-04-14 | Stop reason: HOSPADM

## 2021-04-12 RX ORDER — ONDANSETRON 4 MG/1
4 TABLET, ORALLY DISINTEGRATING ORAL EVERY 8 HOURS PRN
Status: DISCONTINUED | OUTPATIENT
Start: 2021-04-12 | End: 2021-04-14 | Stop reason: HOSPADM

## 2021-04-12 RX ORDER — CETIRIZINE HYDROCHLORIDE 10 MG/1
10 TABLET ORAL DAILY
Status: DISCONTINUED | OUTPATIENT
Start: 2021-04-13 | End: 2021-04-14 | Stop reason: HOSPADM

## 2021-04-12 RX ORDER — MORPHINE SULFATE 2 MG/ML
2 INJECTION, SOLUTION INTRAMUSCULAR; INTRAVENOUS
Status: DISCONTINUED | OUTPATIENT
Start: 2021-04-12 | End: 2021-04-14 | Stop reason: HOSPADM

## 2021-04-12 RX ORDER — FLUTICASONE PROPIONATE 110 UG/1
2 AEROSOL, METERED RESPIRATORY (INHALATION) 2 TIMES DAILY
Status: DISCONTINUED | OUTPATIENT
Start: 2021-04-12 | End: 2021-04-14 | Stop reason: HOSPADM

## 2021-04-12 RX ADMIN — MORPHINE SULFATE 4 MG: 4 INJECTION, SOLUTION INTRAMUSCULAR; INTRAVENOUS at 01:36

## 2021-04-12 RX ADMIN — FAMOTIDINE 20 MG: 10 INJECTION, SOLUTION INTRAVENOUS at 21:09

## 2021-04-12 RX ADMIN — LIDOCAINE HYDROCHLORIDE 5 ML: 10 INJECTION, SOLUTION INFILTRATION; PERINEURAL at 02:26

## 2021-04-12 RX ADMIN — ENOXAPARIN SODIUM 40 MG: 40 INJECTION SUBCUTANEOUS at 09:03

## 2021-04-12 RX ADMIN — SODIUM CHLORIDE, POTASSIUM CHLORIDE, SODIUM LACTATE AND CALCIUM CHLORIDE: 600; 310; 30; 20 INJECTION, SOLUTION INTRAVENOUS at 03:26

## 2021-04-12 RX ADMIN — Medication 10 ML: at 21:12

## 2021-04-12 RX ADMIN — SODIUM CHLORIDE, POTASSIUM CHLORIDE, SODIUM LACTATE AND CALCIUM CHLORIDE: 600; 310; 30; 20 INJECTION, SOLUTION INTRAVENOUS at 12:10

## 2021-04-12 RX ADMIN — IOHEXOL 50 ML: 240 INJECTION, SOLUTION INTRATHECAL; INTRAVASCULAR; INTRAVENOUS; ORAL at 00:49

## 2021-04-12 RX ADMIN — IOPAMIDOL 75 ML: 755 INJECTION, SOLUTION INTRAVENOUS at 00:48

## 2021-04-12 RX ADMIN — POTASSIUM CHLORIDE AND SODIUM CHLORIDE: 900; 150 INJECTION, SOLUTION INTRAVENOUS at 02:52

## 2021-04-12 RX ADMIN — Medication 10 ML: at 08:48

## 2021-04-12 RX ADMIN — FLUTICASONE PROPIONATE 2 PUFF: 110 AEROSOL, METERED RESPIRATORY (INHALATION) at 20:21

## 2021-04-12 RX ADMIN — FAMOTIDINE 20 MG: 10 INJECTION, SOLUTION INTRAVENOUS at 08:48

## 2021-04-12 RX ADMIN — SODIUM CHLORIDE, POTASSIUM CHLORIDE, SODIUM LACTATE AND CALCIUM CHLORIDE: 600; 310; 30; 20 INJECTION, SOLUTION INTRAVENOUS at 21:09

## 2021-04-12 RX ADMIN — MONTELUKAST 10 MG: 10 TABLET, FILM COATED ORAL at 21:09

## 2021-04-12 ASSESSMENT — PAIN SCALES - GENERAL
PAINLEVEL_OUTOF10: 0
PAINLEVEL_OUTOF10: 1
PAINLEVEL_OUTOF10: 0
PAINLEVEL_OUTOF10: 0

## 2021-04-12 ASSESSMENT — PAIN DESCRIPTION - LOCATION: LOCATION: ABDOMEN

## 2021-04-12 ASSESSMENT — PAIN DESCRIPTION - PROGRESSION: CLINICAL_PROGRESSION: GRADUALLY IMPROVING

## 2021-04-12 ASSESSMENT — PAIN DESCRIPTION - PAIN TYPE: TYPE: ACUTE PAIN

## 2021-04-12 NOTE — CARE COORDINATION
INITIAL CASE MANAGEMENT ASSESSMENT    Reviewed chart, met with patient to assess possible discharge needs. Explained Case Management role/services. Living Situation: Confirmed address, lives in bilevel house w/ daughter Natalee Nearing, 6 steps w/ railing to enter    ADLs: Independent - daughter assists w/ homemaking     DME: No DME -  occasionally uses her walking stick due to back pain    PT/OT Recs: Not ordered     Active Services: None     Transportation: Active  - only drives short distances     Medications: Uses [facility] on [address] - no issues w/ purchasing meds    PCP: Floyd Branham MD      HD/PD: n/a    PLAN/COMMENTS: Patient will return home w/ daughter. Denies needs. Daughter will transport home. CM provided contact information for patient or family to call with any questions. CM will follow and assist as needed.   Electronically signed by Larisa Del Rio RN Case Management [phone] on 4/12/2021 at 10:42 AM

## 2021-04-12 NOTE — H&P
to Admission medications    Medication Sig Start Date End Date Taking? Authorizing Provider   montelukast (SINGULAIR) 10 MG tablet Take 1 tablet by mouth nightly 2/8/21   Kay Overall, DO   tiZANidine (ZANAFLEX) 4 MG tablet 1/2 to 1 tid prn muscle spasm 1/13/21   Viral Payne MD   fluticasone [facility] HFA) 110 MCG/ACT inhaler INHALE 2 PUFFS BY MOUTH TWICE DAILY 12/30/20   Kay Overall, DO   clindamycin (CLEOCIN T) 1 % external solution Apply to affected area BID prn flares NDC:[address]-25, pt prefers this one 10/20/20   Nahed Callahan MD   famotidine (PEPCID) 20 MG tablet Take 20 mg by mouth daily as needed    Historical Provider, MD   ibuprofen (ADVIL;MOTRIN) 200 MG tablet Take 200 mg by mouth every 6 hours as needed for Pain    Historical Provider, MD   Pseudoephedrine HCl (SUDAFED 12 HOUR PO) Take by mouth    Historical Provider, MD   azelastine (ASTELIN) 0.1 % nasal spray 1 spray by Nasal route 2 times daily Use in each nostril as directed 6/17/20   Kay Overall, DO   albuterol sulfate  (90 Base) MCG/ACT inhaler Inhale 2 puffs into the lungs every 4 hours as needed for Wheezing or Shortness of Breath (or cough) 9/24/19   Kay Overall, DO   Psyllium (METAMUCIL PO) Take by mouth    Historical Provider, MD   Spacer/Aero-Holding Chambers (E-Z SPACER) CONCEPCION 1 Device by Does not apply route daily as needed 6/6/16   Kay Overall, DO   Omega-3 Fatty Acids (FISH OIL PO) Take by mouth    Historical Provider, MD   Multiple Vitamins-Minerals (PRESERVISION AREDS 2) CAPS Take by mouth    Historical Provider, MD   Vitamin D (CHOLECALCIFEROL) 1000 UNITS CAPS capsule Take 1,000 Units by mouth daily    Historical Provider, MD   Multiple Vitamins-Minerals (CENTRUM SILVER) TABS Take  by mouth daily. Historical Provider, MD   cetirizine (ZYRTEC ALLERGY) 10 MG tablet Take 10 mg by mouth daily.     Historical Provider, MD   Calcium Carbonate-Vitamin D (CALTRATE 600+D PO) Take  by mouth. Historical Provider, MD   aspirin 81 MG chewable tablet Take 81 mg by mouth daily. Historical Provider, MD    Scheduled Meds:   montelukast  10 mg Oral Nightly    sodium chloride flush  5-40 mL Intravenous 2 times per day    enoxaparin  40 mg Subcutaneous Daily    famotidine (PEPCID) injection  20 mg Intravenous BID     Continuous Infusions:   sodium chloride      lactated ringers 125 mL/hr at 04/12/21 1210     PRN Meds:.albuterol, sodium chloride flush, sodium chloride, ondansetron **OR** ondansetron, acetaminophen, morphine  Allergies  has No Known Allergies. Family History  Reviewedfamily history includes Cancer (age of onset: 66) in her mother; Dementia in her sister; Diabetes in her father, maternal aunt, and paternal uncle; Heart Disease in her mother; High Blood Pressure in her sister and sister. Social History   reports that she has never smoked. She has never used smokeless tobacco. She reports current alcohol use. She reports that she does not use drugs. EDUCATION  Patient educated about their illness/diagnosis, stated above, and all questions answered. We discussed the importance of nutrition, medications they are taking, and healthy lifestyle. Review of Systems:  General Denies any fever or chills  HEENT Denies any diplopia, tinnitus or vertigo  Resp Denies any shortness of breath, cough or wheezing  Cardiac Denies any chest pain, palpitations, claudication or edema  GI Denies any melena, hematochezia, hematemesis or pyrosis   Denies any frequency, urgency, hesitancy or incontinence  Heme Denies bruising or bleeding easily  Neuro Denies any focal motor or sensory deficits  OBJECTIVE:   VITALS:  height is 5' 4\" (1.626 m) and weight is 149 lb 14.6 oz (68 kg). Her oral temperature is 98.1 °F (36.7 °C). Her blood pressure is 113/63 and her pulse is 67. Her respiration is 16 and oxygen saturation is 96%.    CONSTITUTIONAL: Alert and oriented times 3, no acute distress and cooperative to examination with proper mood and affect. SKIN: Skin color, texture, turgor normal. No rashes or lesions. LYMPH: no cervical nodes, no inguinal nodes  HEENT: Head is normocephalic, atraumatic. EOMI, PERRLA. NECK: Supple, symmetrical, trachea midline, no adenopathy, thyroid symmetric, not enlarged and no tenderness, skin normal.  CHEST/LUNGS: chest symmetric with normal A/P diameter, normal respiratory rate and rhythm  CARDIOVASCULAR: Heart sounds are normal.  Regular rate and rhythm   ABDOMEN: mild distention Tenderness: upper abdominal pain. No guarding or rebound tenderness. RECTAL: deferred, not clinically indicated  NEUROLOGIC: There are no focalizing motor or sensory deficits. CN II-XII are grossly intact. Elio Olden EXTREMITIES: no cyanosis, no clubbing and no edema.   LABS:     Recent Labs     04/11/21  2335 04/12/21  0240 04/12/21  0724   WBC 8.9  --  7.8   HGB 13.6  --  12.4   HCT 39.8  --  36.5     --  212     --  138   K 3.3*  --  3.6   CL 99  --  102   CO2 25  --  25   BUN 12  --  9   CREATININE <0.5*  --  <0.5*   MG 2.30  --   --    CALCIUM 9.6  --  9.1   AST 25  --   --    ALT 23  --   --    BILITOT 0.3  --   --    NITRU  --  Negative  --    COLORU  --  YELLOW  --      Recent Labs     04/11/21  2335   ALKPHOS 102   ALT 23   AST 25   BILITOT 0.3   LABALBU 4.4   LIPASE 32.0     CBC:   Lab Results   Component Value Date    WBC 7.8 04/12/2021    RBC 4.29 04/12/2021    HGB 12.4 04/12/2021    HCT 36.5 04/12/2021    MCV 85.2 04/12/2021    MCH 29.0 04/12/2021    MCHC 34.1 04/12/2021    RDW 13.7 04/12/2021     04/12/2021    MPV 7.4 04/12/2021     CMP:    Lab Results   Component Value Date     04/12/2021    K 3.6 04/12/2021    K 3.3 04/11/2021     04/12/2021    CO2 25 04/12/2021    BUN 9 04/12/2021    CREATININE <0.5 04/12/2021    GFRAA >60 04/12/2021    AGRATIO 1.7 04/11/2021    LABGLOM >60 04/12/2021    GLUCOSE 130 04/12/2021 PROT 7.0 04/11/2021    LABALBU 4.4 04/11/2021    CALCIUM 9.1 04/12/2021    BILITOT 0.3 04/11/2021    ALKPHOS 102 04/11/2021    AST 25 04/11/2021    ALT 23 04/11/2021     Urine Culture:  No components found for: CURINE  Blood Culture:  No components found for: CBLOOD, CFUNGUSBL  RADIOLOGY:     CT scan abd/pelvis(4/12/21):   1. Small-bowel obstruction with transition point in the pelvis.  Swirling of   the mesentery noted in the region of the transition point and internal hernia   is in the differential.   2. 1.2 cm low-attenuation lesion in the pancreatic head.  Follow-up   recommended. Thank you for the interesting evaluation. Further recommendations to follow. Chayo Lora [place]  General and Vascular Surgery [phone]  Electronically signed by ERIN Parker on 4/12/2021 at 12:29 PM         Surgery Staff  I have examined this patient and read and agree with the note by James Caputo PA-C from today. Chart, labs, and imaging reviewed  Acute onset generalized pain, nausea, emesis last evening at dinner. Nothing similar in past.  CT with SBO. NG unable to be placed due to intolerance  Feels dramatically better today    AFVSS  Abdomen soft, nontender. Mild distention  Labs ok  CT images reviewed with SBO, possible internal hernia    A/P SBO secondary to adhesion. Symptoms resolved today  No GI function yet  Hold on NG placement with improvement in symptoms  Continue NPO, conservative care  Repeat films tomorrow. If improved then will start PO.   [place] exploration for acute worsening or failure to resolve  Resume meds for reactive airway disease      Electronically signed by Berry Nava MD on 4/12/2021 at 3:38 PM

## 2021-04-12 NOTE — PROGRESS NOTES
Patient resting quietly in bed. Alert and oriented. Ambulates with steady gate to bathroom. IV fluids infusing. Patient NPO with exception of ice chips. States, \"I feel so much better today. \" Rates pain 1/10. Denies any nausea or vomiting. VSS. Morning medications given without difficulty. Bed alarm on. Call light and belongings within reach. Will continue to monitor.      Electronically signed by Tray Thacker RN on 4/12/2021 at 10:41 AM

## 2021-04-12 NOTE — ED PROVIDER NOTES
Per my interpretation:    Electrocardiogram (ECG) 4/11/2021 2314  RATE: normal  RHYTHM: normal sinus  AXIS: normal  INTERVALS: normal  ST-T WAVE CHANGES: No evidence of ST segment elevation or T-wave inversion  Prior for comparison 9/21/2020     Chuy Robbins MD  04/12/21 0021

## 2021-04-12 NOTE — PLAN OF CARE
Problem: Safety:  Goal: Free from accidental physical injury  Description: Free from accidental physical injury  Outcome: Ongoing     Problem: Daily Care:  Goal: Daily care needs are met  Description: Daily care needs are met  Outcome: Ongoing     Problem: Pain:  Goal: Patient's pain/discomfort is manageable  Description: Patient's pain/discomfort is manageable  Outcome: Ongoing  Goal: Control of acute pain  Description: Control of acute pain  Outcome: Ongoing     Problem:  Bowel/Gastric:  Goal: Ability to achieve a regular elimination pattern will improve  Description: Ability to achieve a regular elimination pattern will improve  Outcome: Ongoing     Problem: Skin Integrity:  Goal: Risk for impaired skin integrity will decrease  Description: Risk for impaired skin integrity will decrease  Outcome: Ongoing

## 2021-04-12 NOTE — ED NOTES
Report called to [place], RN on 5N. Denies further questions.      Butch Beckman RN  04/12/21 7477

## 2021-04-12 NOTE — PROGRESS NOTES
Pt arrived to floor via stretcher from ED and ambulated to bed. Telemetry box 109 activated and confirmed with [facility]. Patient oriented to room and use of call light. Call light and personal items within reach. Admission and assessment initiated. POC and education initiated and reviewed with patient. Denied further needs or questions at this time. Will continue to monitor.     Electronically signed by Akbar Clark RN on 4/12/2021 at 3:26 AM

## 2021-04-12 NOTE — ED PROVIDER NOTES
**THIS IS AN INDEPENDENT ZAC ENCOUNTER**          [address] [facility]        Pt Name: Zofia Montemayor  MRN: [id number]  [place] 1945  Date of evaluation: 4/11/2021  Provider: AAMIR Joya - GUADALUPE  PCP: eKv Melendrez MD      [address] [place] [address]       Chief Complaint   Patient presents with    Abdominal Pain     around 1600, nausea/vomiting x1, upper abdomen, sharp       HISTORY OF PRESENT ILLNESS   (Location, Timing/Onset, Context/Setting, Quality, Duration, Modifying Factors, Severity, Associated Signs and Symptoms)  Note limiting factors. Zofia Montemayor is a 67 y.o. female who presents to the emergency department today complaining of upper abdominal pain with vomiting. Onset was this evening. Pain has been waxing and waning. Normal bowel movement this morning. No fevers. No chest pain shortness of breath or cough. Currently rates pain 8/10 and describes it as a sharp pain. Previous abdominal surgeries include a abdominal hysterectomy, tubal ligation, and appendectomy. Nursing Notes were all reviewed and agreed with or any disagreements were addressed in the HPI. REVIEW OF SYSTEMS    (2-9 systems for level 4, 10 or more for level 5)     Review of Systems   Constitutional: Negative for chills, diaphoresis and fever. HENT: Negative for trouble swallowing. Respiratory: Negative for cough and shortness of breath. Cardiovascular: Negative for chest pain. Gastrointestinal: Positive for abdominal pain, nausea and vomiting. Negative for abdominal distention, blood in stool, constipation and diarrhea. Genitourinary: Negative for dysuria, flank pain, frequency and hematuria. Musculoskeletal: Negative for back pain. Skin: Negative for color change and rash. Allergic/Immunologic: Negative for immunocompromised state. Neurological: Negative for dizziness and headaches. Hematological: Negative for adenopathy. Psychiatric/Behavioral: Negative for confusion. All other systems reviewed and are negative. Positives and Pertinent negatives as per HPI. Except as noted above in the ROS, all other systems were reviewed and negative. PAST MEDICAL HISTORY     Past Medical History:   Diagnosis Date    Actinic keratosis     goes to derm annually    Cataract mild    Diverticulosis     Dry eye     Hyperlipidemia     Macular degeneration     dr Gaby Dunn Mitral valve prolapse     diagnosed [facility] early 1990s. asymptomatic    Osteopenia     RAD (reactive airway disease)     treated by dr Chalo Vides.  Seasonal allergies          SURGICAL HISTORY     Past Surgical History:   Procedure Laterality Date    APPENDECTOMY  1948    DILATION AND CURETTAGE OF UTERUS      1979    EYE SURGERY      to remove ptyergium    COCO AND BSO  1989    secondary to fibroids    TUBAL LIGATION  1979         CURRENTMEDICATIONS       Previous Medications    ALBUTEROL SULFATE  (90 BASE) MCG/ACT INHALER    Inhale 2 puffs into the lungs every 4 hours as needed for Wheezing or Shortness of Breath (or cough)    ALBUTEROL SULFATE  (90 BASE) MCG/ACT INHALER    INHALE 2 PUFFS BY MOUTH EVERY 4 HOURS AS NEEDED FOR WHEEZING    ASPIRIN 81 MG CHEWABLE TABLET    Take 81 mg by mouth daily. AZELASTINE (ASTELIN) 0.1 % NASAL SPRAY    1 spray by Nasal route 2 times daily Use in each nostril as directed    CALCIUM CARBONATE-VITAMIN D (CALTRATE 600+D PO)    Take  by mouth. CETIRIZINE (ZYRTEC ALLERGY) 10 MG TABLET    Take 10 mg by mouth daily.     CLINDAMYCIN (CLEOCIN T) 1 % EXTERNAL SOLUTION    Apply to affected area BID prn flares [id number], pt prefers this one    FAMOTIDINE (PEPCID) 20 MG TABLET    Take 20 mg by mouth daily as needed    FLUTICASONE (FLOVENT HFA) 110 MCG/ACT INHALER    INHALE 2 PUFFS BY MOUTH TWICE DAILY    IBUPROFEN (ADVIL;MOTRIN) 200 MG TABLET    Take 200 mg by mouth every 6 hours as needed for Pain MONTELUKAST (SINGULAIR) 10 MG TABLET    Take 1 tablet by mouth nightly    MULTIPLE VITAMINS-MINERALS (CENTRUM SILVER) TABS    Take  by mouth daily. MULTIPLE VITAMINS-MINERALS (PRESERVISION AREDS 2) CAPS    Take by mouth    OMEGA-3 FATTY ACIDS (FISH OIL PO)    Take by mouth    POLYETHYL GLYCOL-PROPYL GLYCOL 0.4-0.3 % (SYSTANE) 0.4-0.3 % OPHTHALMIC SOLUTION    1 drop as needed for Dry Eyes. PSEUDOEPHEDRINE HCL (SUDAFED 12 HOUR PO)    Take by mouth    PSYLLIUM (METAMUCIL PO)    Take by mouth    SPACER/AERO-HOLDING CHAMBERS (E-Z SPACER) CONCEPCION    1 Device by Does not apply route daily as needed    TIZANIDINE (ZANAFLEX) 4 MG TABLET    1/2 to 1 tid prn muscle spasm    VITAMIN D (CHOLECALCIFEROL) 1000 UNITS CAPS CAPSULE    Take 1,000 Units by mouth daily         ALLERGIES     Patient has no known allergies. FAMILYHISTORY       Family History   Problem Relation Age of Onset   [facility] Cancer Mother 66        lung (was a smoker)    Heart Disease Mother     Diabetes Father     High Blood Pressure Sister     High Blood Pressure Sister     Dementia Sister         PARTIALLY    Diabetes Maternal Aunt     Diabetes Paternal Uncle           SOCIAL HISTORY       Social History     Tobacco Use    Smoking status: Never Smoker    Smokeless tobacco: Never Used   Substance Use Topics    Alcohol use: Yes     Alcohol/week: 0.0 standard drinks     Comment: rarely    Drug use: No       SCREENINGS             PHYSICAL EXAM    (up to 7 for level 4, 8 or more for level 5)     ED Triage Vitals [04/11/21 2149]   BP Temp Temp Source Pulse Resp SpO2 Height Weight   (!) 153/88 98 °F (36.7 °C) Oral 102 16 98 % -- --       Physical Exam  Vitals signs and nursing note reviewed. Constitutional:       General: She is not in acute distress. Appearance: Normal appearance. She is well-developed. She is not toxic-appearing. HENT:      Head: Normocephalic and atraumatic. Eyes:      General: No scleral icterus.      Conjunctiva/sclera: Conjunctivae normal.   Neck:      Musculoskeletal: Normal range of motion and neck supple. Vascular: No JVD. Cardiovascular:      Rate and Rhythm: Normal rate and regular rhythm. Heart sounds: Normal heart sounds. Pulmonary:      Effort: Pulmonary effort is normal. No respiratory distress. Breath sounds: Normal breath sounds. Abdominal:      General: Bowel sounds are normal. There is no distension. Palpations: Abdomen is soft. Abdomen is not rigid. Tenderness: There is abdominal tenderness. There is no guarding or rebound. Comments: Tenderness in the LUQ that refers pain to the RLQ. Musculoskeletal: Normal range of motion. Skin:     General: Skin is warm and dry. Findings: No rash. Neurological:      General: No focal deficit present. Mental Status: She is alert and oriented to person, place, and time.    Psychiatric:         Mood and Affect: Mood normal.         DIAGNOSTIC RESULTS   LABS:    Labs Reviewed   CBC WITH AUTO DIFFERENTIAL - Abnormal; Notable for the following components:       Result Value    Lymphocytes Absolute 0.9 (*)     All other components within normal limits    Narrative:     Performed at:  [address] [address]   Phone [phone]   COMPREHENSIVE METABOLIC PANEL W/ REFLEX TO MG FOR LOW K - Abnormal; Notable for the following components:    Potassium reflex Magnesium 3.3 (*)     Glucose 129 (*)     CREATININE <0.5 (*)     All other components within normal limits    Narrative:     Performed at:  [address] [address]   Phone [phone]   LACTIC ACID, PLASMA    Narrative:     Performed at:  [address] [address]   Phone [phone]   LIPASE    Narrative:     Performed at:  [facility]  [address] [place] [address], treatment. She was given all test results and given an opportunity to ask and have any questions answered. She was agreeable to admission. At the time of this note patient is refusing anything to go in her nose or throat. I consulted with general surgery, Dr. Virginia Pena, who agreed to admit patient and write orders. The patient tolerated their visit well. I saw the patient independently with physician available for consultation as needed. The patient and / or the family were informed of the results of any tests, a time was given to answer questions, a plan was proposed and they agreed with plan. FINAL IMPRESSION      1. SBO (small bowel obstruction) [facility]          DISPOSITION/PLAN   DISPOSITION Admitted 04/12/2021 01:46:34 AM      PATIENT REFERREDTO:  No follow-up provider specified.     DISCHARGE MEDICATIONS:  New Prescriptions    No medications on file       DISCONTINUED MEDICATIONS:  Discontinued Medications    No medications on file              (Please note that portions of this note were completed with a voice recognition program.  Efforts were made to edit the dictations but occasionally words are mis-transcribed.)    AAMIR Bernstein CNP (electronically signed)           AAMIR Bernstein CNP  04/12/21 0204

## 2021-04-12 NOTE — PLAN OF CARE
Maintained on Zoloft 25 mg as an outpatient, increased to 50 mg here  No homicidal or suicidal ideation Problem: Infection:  Goal: Will remain free from infection  Description: Will remain free from infection  Outcome: Ongoing     Problem: Safety:  Goal: Free from accidental physical injury  Description: Free from accidental physical injury  4/12/2021 0744 by Evelia Argueta RN  Outcome: Ongoing  Note: Bed in lowest position, wheels locked, bed/chair exit alarm in place, call light within reach, and non skid footwear on. Walkway free of clutter. Pt alert and oriented and able to make needs known. Pt educated to use call light when needing to get up, and pt utilizes call light to make needs known. Will continue to monitor. Electronically signed by Evelia Argueta RN on 4/12/2021 at 7:44 AM      Problem: Daily Care:  Goal: Daily care needs are met  Description: Daily care needs are met  4/12/2021 0744 by Evelia Argueta RN  Outcome: Ongoing  Note: Patient assessed to determine ability to perform daily needs and ADLs. Patient assisted with any daily needs that were not able to be met individually by patient. [place] encouraged, although patient educated to ask for assistance when needed. Will continue to monitor and assist patient with meeting daily care needs as needed. Electronically signed by Evelia Argueta RN on 4/12/2021 at 7:44 AM      Problem: Pain:  Goal: Patient's pain/discomfort is manageable  Description: Patient's pain/discomfort is manageable  4/12/2021 0744 by Evelia Argueta RN  Outcome: Ongoing  Note: Educated patient on pain management. Will assess patients pain level per unit protocol, and provide pain management measures as needed. Electronically signed by Evelia Argueta RN on 4/12/2021 at 7:44 AM      Problem: Skin Integrity:  Goal: Skin integrity will stabilize  Description: Skin integrity will stabilize  Outcome: Ongoing  Note: Will monitor skin and mucous membranes. Will turn patient every 2 hours, monitor for friction and sheering, and change dressings as needed.   Will preform skin assessment every shift. Electronically signed by Josee Lora RN on 4/12/2021 at 7:45 AM      Problem: Discharge Planning:  Goal: Patients continuum of care needs are met  Description: Patients continuum of care needs are met  Outcome: Ongoing  Note: Assessed patients knowledge of discharge. Will continue to work with patient on discharge planning and answer patient questions. Will consult case management and  as necessary.  Electronically signed by Josee Lora RN on 4/12/2021 at 7:45 AM

## 2021-04-13 ENCOUNTER — APPOINTMENT (OUTPATIENT)
Dept: GENERAL RADIOLOGY | Age: 76
DRG: 390 | End: 2021-04-13
Payer: COMMERCIAL

## 2021-04-13 PROCEDURE — 94640 AIRWAY INHALATION TREATMENT: CPT

## 2021-04-13 PROCEDURE — 6370000000 HC RX 637 (ALT 250 FOR IP): Performed by: PHYSICIAN ASSISTANT

## 2021-04-13 PROCEDURE — 2500000003 HC RX 250 WO HCPCS: Performed by: SURGERY

## 2021-04-13 PROCEDURE — 6370000000 HC RX 637 (ALT 250 FOR IP): Performed by: NURSE PRACTITIONER

## 2021-04-13 PROCEDURE — 2580000003 HC RX 258: Performed by: SURGERY

## 2021-04-13 PROCEDURE — APPNB30 APP NON BILLABLE TIME 0-30 MINS: Performed by: PHYSICIAN ASSISTANT

## 2021-04-13 PROCEDURE — G0378 HOSPITAL OBSERVATION PER HR: HCPCS

## 2021-04-13 PROCEDURE — APPSS15 APP SPLIT SHARED TIME 0-15 MINUTES: Performed by: PHYSICIAN ASSISTANT

## 2021-04-13 PROCEDURE — 99232 SBSQ HOSP IP/OBS MODERATE 35: CPT | Performed by: SURGERY

## 2021-04-13 PROCEDURE — 6360000002 HC RX W HCPCS: Performed by: SURGERY

## 2021-04-13 PROCEDURE — 1200000000 HC SEMI PRIVATE

## 2021-04-13 PROCEDURE — 74019 RADEX ABDOMEN 2 VIEWS: CPT

## 2021-04-13 PROCEDURE — 94761 N-INVAS EAR/PLS OXIMETRY MLT: CPT

## 2021-04-13 PROCEDURE — 6370000000 HC RX 637 (ALT 250 FOR IP): Performed by: SURGERY

## 2021-04-13 PROCEDURE — 96372 THER/PROPH/DIAG INJ SC/IM: CPT

## 2021-04-13 PROCEDURE — 96376 TX/PRO/DX INJ SAME DRUG ADON: CPT

## 2021-04-13 RX ORDER — POLYETHYLENE GLYCOL 3350 17 G/17G
17 POWDER, FOR SOLUTION ORAL DAILY
Status: DISCONTINUED | OUTPATIENT
Start: 2021-04-13 | End: 2021-04-14 | Stop reason: HOSPADM

## 2021-04-13 RX ADMIN — FAMOTIDINE 20 MG: 10 INJECTION, SOLUTION INTRAVENOUS at 20:27

## 2021-04-13 RX ADMIN — ASPIRIN 81 MG: 81 TABLET, CHEWABLE ORAL at 08:04

## 2021-04-13 RX ADMIN — CETIRIZINE HYDROCHLORIDE 10 MG: 10 TABLET, FILM COATED ORAL at 08:04

## 2021-04-13 RX ADMIN — SODIUM CHLORIDE, POTASSIUM CHLORIDE, SODIUM LACTATE AND CALCIUM CHLORIDE: 600; 310; 30; 20 INJECTION, SOLUTION INTRAVENOUS at 14:10

## 2021-04-13 RX ADMIN — POLYETHYLENE GLYCOL 3350 17 G: 17 POWDER, FOR SOLUTION ORAL at 10:44

## 2021-04-13 RX ADMIN — Medication 10 ML: at 20:27

## 2021-04-13 RX ADMIN — FAMOTIDINE 20 MG: 10 INJECTION, SOLUTION INTRAVENOUS at 08:04

## 2021-04-13 RX ADMIN — FLUTICASONE PROPIONATE 2 PUFF: 110 AEROSOL, METERED RESPIRATORY (INHALATION) at 07:44

## 2021-04-13 RX ADMIN — ENOXAPARIN SODIUM 40 MG: 40 INJECTION SUBCUTANEOUS at 08:04

## 2021-04-13 RX ADMIN — SODIUM CHLORIDE, POTASSIUM CHLORIDE, SODIUM LACTATE AND CALCIUM CHLORIDE: 600; 310; 30; 20 INJECTION, SOLUTION INTRAVENOUS at 20:40

## 2021-04-13 RX ADMIN — MONTELUKAST 10 MG: 10 TABLET, FILM COATED ORAL at 20:27

## 2021-04-13 RX ADMIN — ACETAMINOPHEN 650 MG: 325 TABLET ORAL at 09:19

## 2021-04-13 RX ADMIN — FLUTICASONE PROPIONATE 2 PUFF: 110 AEROSOL, METERED RESPIRATORY (INHALATION) at 20:01

## 2021-04-13 RX ADMIN — SODIUM CHLORIDE, POTASSIUM CHLORIDE, SODIUM LACTATE AND CALCIUM CHLORIDE: 600; 310; 30; 20 INJECTION, SOLUTION INTRAVENOUS at 05:29

## 2021-04-13 ASSESSMENT — PAIN - FUNCTIONAL ASSESSMENT: PAIN_FUNCTIONAL_ASSESSMENT: ACTIVITIES ARE NOT PREVENTED

## 2021-04-13 ASSESSMENT — PAIN DESCRIPTION - PROGRESSION: CLINICAL_PROGRESSION: GRADUALLY WORSENING

## 2021-04-13 ASSESSMENT — PAIN SCALES - GENERAL
PAINLEVEL_OUTOF10: 2
PAINLEVEL_OUTOF10: 0
PAINLEVEL_OUTOF10: 0

## 2021-04-13 ASSESSMENT — PAIN DESCRIPTION - DESCRIPTORS: DESCRIPTORS: HEADACHE

## 2021-04-13 ASSESSMENT — PAIN DESCRIPTION - PAIN TYPE: TYPE: ACUTE PAIN

## 2021-04-13 NOTE — PLAN OF CARE
Problem: Infection:  Goal: Will remain free from infection  Description: Will remain free from infection  Outcome: Ongoing     Problem: Safety:  Goal: Free from accidental physical injury  Description: Free from accidental physical injury  Outcome: Ongoing  Goal: Free from intentional harm  Description: Free from intentional harm  Outcome: Ongoing     Problem: Daily Care:  Goal: Daily care needs are met  Description: Daily care needs are met  Outcome: Ongoing     Problem: Pain:  Goal: Patient's pain/discomfort is manageable  Description: Patient's pain/discomfort is manageable  Outcome: Ongoing  Goal: Pain level will decrease  Description: Pain level will decrease  Outcome: Ongoing  Goal: Control of acute pain  Description: Control of acute pain  Outcome: Ongoing  Goal: Control of chronic pain  Description: Control of chronic pain  Outcome: Ongoing     Problem: Skin Integrity:  Goal: Skin integrity will stabilize  Description: Skin integrity will stabilize  Outcome: Ongoing     Problem: Discharge Planning:  Goal: Patients continuum of care needs are met  Description: Patients continuum of care needs are met  Outcome: Ongoing     Problem:  Bowel/Gastric:  Goal: Control of bowel function will improve  Description: Control of bowel function will improve  Outcome: Ongoing  Goal: Ability to achieve a regular elimination pattern will improve  Description: Ability to achieve a regular elimination pattern will improve  Outcome: Ongoing     Problem: Nutritional:  Goal: Ability to follow a diet with enough fiber (20 to 30 grams) for normal bowel function will improve  Description: Ability to follow a diet with enough fiber (20 to 30 grams) for normal bowel function will improve  Outcome: Ongoing     Problem: Skin Integrity:  Goal: Risk for impaired skin integrity will decrease  Description: Risk for impaired skin integrity will decrease  Outcome: Ongoing     Problem: Falls - Risk of:  Goal: Will remain free from falls  Description: Will remain free from falls  Outcome: Ongoing  Goal: Absence of physical injury  Description: Absence of physical injury  Outcome: Ongoing

## 2021-04-13 NOTE — PROGRESS NOTES
General and Vascular Surgery                                                           Daily Progress Note                                                             Peterson Chamorro PA-C     Pt Name: Kaylin Solis Record Number: [id number]  Date of Birth 1945   Today's Date: 4/13/2021    Chief Complaint: abdominal pain improving    ASSESSMENT/PLAN   1. SBO resolving  2. +flatulence and BMs  3. Nausea and emesis resolved  4. Pain improved. Mild pain in lower abdomen no guarding. 5. Will increase diet to full liquids as tolerated  6. Add Miralax   7. OOB and ambulate  8. If bowel function continues to improve and diet tolerated. Possible D/C in next 1-2 days  EDUCATION  Patient educated about their illness/diagnosis, stated above, and all questions answered. We discussed the importance of nutrition, medications they are taking, and healthy lifestyle. Liz Vivar has improved from yesterday. Pain is well controlled. She has no nausea and no vomiting. She has passed flatus and has had a bowel movement. She is tolerating thin liquids. Current activity is ad maicol  OBJECTIVE  VITALS:  height is 5' 4\" (1.626 m) and weight is 145 lb 4.5 oz (65.9 kg). Her temperature is 97.7 °F (36.5 °C). Her blood pressure is 130/68 and her pulse is 76. Her respiration is 18 and oxygen saturation is 94%. VITALS:  /68   Pulse 76   Temp 97.7 °F (36.5 °C)   Resp 18   Ht 5' 4\" (1.626 m)   Wt 145 lb 4.5 oz (65.9 kg)   SpO2 94%   BMI 24.94 kg/m²   GENERAL: alert, cooperative, no distress  ABDOMEN: non-distended and tenderness improved,  without rebound and guarding  I/O last 3 completed shifts: In: 2296.3 [P.O.:240;  I.V.:2056.3]  Out: 1450 [Urine:1450]  I/O this shift:  In: -   Out: 900 [Urine:900]    LABS  Recent Labs     04/11/21  2335 04/12/21  0240 04/12/21  0724   WBC 8.9  --  7.8   HGB 13.6  --  12.4   HCT 39.8  --  36.5     --

## 2021-04-14 VITALS
SYSTOLIC BLOOD PRESSURE: 149 MMHG | HEART RATE: 79 BPM | OXYGEN SATURATION: 98 % | TEMPERATURE: 97.4 F | RESPIRATION RATE: 20 BRPM | HEIGHT: 64 IN | BODY MASS INDEX: 24.69 KG/M2 | DIASTOLIC BLOOD PRESSURE: 76 MMHG | WEIGHT: 144.62 LBS

## 2021-04-14 PROCEDURE — 94761 N-INVAS EAR/PLS OXIMETRY MLT: CPT

## 2021-04-14 PROCEDURE — 94640 AIRWAY INHALATION TREATMENT: CPT

## 2021-04-14 PROCEDURE — 96372 THER/PROPH/DIAG INJ SC/IM: CPT

## 2021-04-14 PROCEDURE — 96376 TX/PRO/DX INJ SAME DRUG ADON: CPT

## 2021-04-14 PROCEDURE — 6370000000 HC RX 637 (ALT 250 FOR IP): Performed by: PHYSICIAN ASSISTANT

## 2021-04-14 PROCEDURE — 2580000003 HC RX 258: Performed by: SURGERY

## 2021-04-14 PROCEDURE — G0378 HOSPITAL OBSERVATION PER HR: HCPCS

## 2021-04-14 PROCEDURE — 6360000002 HC RX W HCPCS: Performed by: SURGERY

## 2021-04-14 PROCEDURE — 2500000003 HC RX 250 WO HCPCS: Performed by: SURGERY

## 2021-04-14 PROCEDURE — 6370000000 HC RX 637 (ALT 250 FOR IP): Performed by: NURSE PRACTITIONER

## 2021-04-14 PROCEDURE — APPNB15 APP NON BILLABLE TIME 0-15 MINS: Performed by: NURSE PRACTITIONER

## 2021-04-14 PROCEDURE — APPSS15 APP SPLIT SHARED TIME 0-15 MINUTES: Performed by: NURSE PRACTITIONER

## 2021-04-14 RX ORDER — POLYETHYLENE GLYCOL 3350 17 G/17G
17 POWDER, FOR SOLUTION ORAL DAILY
Qty: 1530 G | Refills: 1 | Status: SHIPPED | OUTPATIENT
Start: 2021-04-14 | End: 2021-05-14

## 2021-04-14 RX ADMIN — ENOXAPARIN SODIUM 40 MG: 40 INJECTION SUBCUTANEOUS at 08:08

## 2021-04-14 RX ADMIN — POLYETHYLENE GLYCOL 3350 17 G: 17 POWDER, FOR SOLUTION ORAL at 08:08

## 2021-04-14 RX ADMIN — SODIUM CHLORIDE, POTASSIUM CHLORIDE, SODIUM LACTATE AND CALCIUM CHLORIDE: 600; 310; 30; 20 INJECTION, SOLUTION INTRAVENOUS at 11:19

## 2021-04-14 RX ADMIN — CETIRIZINE HYDROCHLORIDE 10 MG: 10 TABLET, FILM COATED ORAL at 08:09

## 2021-04-14 RX ADMIN — FAMOTIDINE 20 MG: 10 INJECTION, SOLUTION INTRAVENOUS at 08:08

## 2021-04-14 RX ADMIN — SODIUM CHLORIDE, POTASSIUM CHLORIDE, SODIUM LACTATE AND CALCIUM CHLORIDE: 600; 310; 30; 20 INJECTION, SOLUTION INTRAVENOUS at 03:57

## 2021-04-14 RX ADMIN — FLUTICASONE PROPIONATE 2 PUFF: 110 AEROSOL, METERED RESPIRATORY (INHALATION) at 08:28

## 2021-04-14 RX ADMIN — ASPIRIN 81 MG: 81 TABLET, CHEWABLE ORAL at 08:08

## 2021-04-14 RX ADMIN — Medication 10 ML: at 08:09

## 2021-04-14 ASSESSMENT — PAIN SCALES - GENERAL: PAINLEVEL_OUTOF10: 0

## 2021-04-14 NOTE — PROGRESS NOTES
General and Vascular Surgery                                                           Daily Progress Note      Pt Name: Phong Perez  Medical Record Number: [id number]  Date of Birth 1945   Today's Date: 4/14/2021    Chief Complaint: abdominal pain improving    ASSESSMENT/PLAN   Resolving SBO  1. +flatulence and BMs  2. Nausea and emesis resolved  3. No abdominal pain  4. Tolerated full liquids, advance to general diet. 5. Continue Miralax   6. OOB and ambulate  7. Home later today if tolerates general diet. EDUCATION  Patient educated about their illness/diagnosis, stated above, and all questions answered. We discussed the importance of nutrition, medications they are taking, and healthy lifestyle. Olga Daily has improved from yesterday. Pain is well controlled. She has no nausea and no vomiting. She has passed flatus and has had a bowel movement. She is tolerating full liquids. Current activity is ad maicol  OBJECTIVE  VITALS:  height is 5' 4\" (1.626 m) and weight is 144 lb 10 oz (65.6 kg). Her oral temperature is 97.4 °F (36.3 °C). Her blood pressure is 149/76 (abnormal) and her pulse is 79. Her respiration is 20 and oxygen saturation is 98%. VITALS:  BP (!) 149/76   Pulse 79   Temp 97.4 °F (36.3 °C) (Oral)   Resp 20   Ht 5' 4\" (1.626 m)   Wt 144 lb 10 oz (65.6 kg)   SpO2 98%   BMI 24.82 kg/m²   GENERAL: alert, cooperative, no distress  ABDOMEN: non-distended and tenderness improved,  without rebound and guarding  I/O last 3 completed shifts:   In: 3870 [P.O.:2040; I.V.:3047]  Out: 4600 [Urine:4600]  I/O this shift:  In: 1119.6 [P.O.:480; I.V.:639.6]  Out: 1900 [Urine:1900]    LABS  Recent Labs     04/11/21  2335 04/12/21  0240 04/12/21  0724   WBC 8.9  --  7.8   HGB 13.6  --  12.4   HCT 39.8  --  36.5     --  212     --  138   K 3.3*  --  3.6   CL 99  --  102   CO2 25  --  25   BUN 12  --  9   CREATININE <0.5* --  <0.5*   MG 2.30  --   --    CALCIUM 9.6  --  9.1   AST 25  --   --    ALT 23  --   --    BILITOT 0.3  --   --    NITRU  --  Negative  --    COLORU  --  YELLOW  --      CBC:   Lab Results   Component Value Date    WBC 7.8 04/12/2021    RBC 4.29 04/12/2021    HGB 12.4 04/12/2021    HCT 36.5 04/12/2021    MCV 85.2 04/12/2021    MCH 29.0 04/12/2021    MCHC 34.1 04/12/2021    RDW 13.7 04/12/2021     04/12/2021    MPV 7.4 04/12/2021     CMP:    Lab Results   Component Value Date     04/12/2021    K 3.6 04/12/2021    K 3.3 04/11/2021     04/12/2021    CO2 25 04/12/2021    BUN 9 04/12/2021    CREATININE <0.5 04/12/2021    GFRAA >60 04/12/2021    AGRATIO 1.7 04/11/2021    LABGLOM >60 04/12/2021    GLUCOSE 130 04/12/2021    PROT 7.0 04/11/2021    LABALBU 4.4 04/11/2021    CALCIUM 9.1 04/12/2021    BILITOT 0.3 04/11/2021    ALKPHOS 102 04/11/2021    AST 25 04/11/2021    ALT 23 04/11/2021     Terrence RUTLEDGE-CNP 12:23 PM 4/14/2021   [facility]  Office: [phone]

## 2021-04-14 NOTE — PROGRESS NOTES
Discharge orders acknowledged by RN . Discharge teaching completed with pt and family. AVS reviewed and all questions answered. New prescriptions and current medication regimen reviewed and pt understands schedule. Follow up appointments also reviewed with pt and resources given for discharge. Pt was given written prescriptions to be filled and understands schedule. IV removed,l pressure held, dry dressing applied. Telemonitor removed and returned to [facility]. All education completed. Required core measures completed. Pt vitals WDL. Pt discharged with all belongings to home. Pt transported off of unit via wheelchair. No complications.        Electronically signed by Bossman Chang RN on 4/14/2021 at 1:07 PM

## 2021-04-22 NOTE — DISCHARGE SUMMARY
Physician Discharge Summary     Patient ID:  Phong Perez  [id number]  34 y.o.  1945    Admit date: 4/11/2021    Discharge date and time: 4/14/2021 12:29 PM     Admitting Physician: Eliud Huang MD     Discharge Physician: same    Admission Diagnoses: SBO (small bowel obstruction) [facility]) [K56.609]    Discharge Diagnoses: same    Admission Condition: fair    Discharged Condition: good    Indication for Admission: SBO    Hospital Course:   SBO  Having BMs and flatus. Resolved with non surgical management. Pain resolved  Abdomen nontender, non distended  F/U KUB with contrast in left colon, mildly dilated residual SB    Consults: none    Significant Diagnostic Studies:   XR ABDOMEN (2 VIEWS)   Final Result   Persistent partial small small-bowel obstruction. No free air. CT ABDOMEN PELVIS W IV CONTRAST Additional Contrast? Oral   Final Result   1. Small-bowel obstruction with transition point in the pelvis. Swirling of   the mesentery noted in the region of the transition point and internal hernia   is in the differential.   2. 1.2 cm low-attenuation lesion in the pancreatic head. Follow-up   recommended. 3. Other findings as described. Discharge Exam:  CONSTITUTIONAL: Alert and oriented times 3, no acute distress and cooperative to examination with proper mood and affect. SKIN: Skin color, texture, turgor normal. No rashes or lesions. LYMPH: no cervical nodes, no inguinal nodes  HEENT: Head is normocephalic, atraumatic. EOMI, PERRLA. NECK: Supple, symmetrical, trachea midline, no adenopathy, thyroid symmetric, not enlarged and no tenderness, skin normal.  CHEST/LUNGS: chest symmetric with normal A/P diameter, normal respiratory rate and rhythm  CARDIOVASCULAR: Heart sounds are normal.  Regular rate and rhythm   ABDOMEN: mild distention Tenderness: upper abdominal pain. No guarding or rebound tenderness.    RECTAL: deferred, not clinically indicated  NEUROLOGIC: There are no focalizing motor or sensory deficits. CN II-XII are grossly intact. Willetta Smiles EXTREMITIES: no cyanosis, no clubbing and no edema. Disposition: home    In process/preliminary results:  Outstanding Order Results     No orders found from 3/13/2021 to 4/12/2021.           Patient Instructions:   Discharge Medication List as of 4/14/2021 12:45 PM      START taking these medications    Details   polyethylene glycol (GLYCOLAX) 17 GM/SCOOP powder Take 17 g by mouth daily, Disp-1530 g, R-1Normal         CONTINUE these medications which have NOT CHANGED    Details   montelukast (SINGULAIR) 10 MG tablet Take 1 tablet by mouth nightly, Disp-90 tablet, R-3Normal      tiZANidine (ZANAFLEX) 4 MG tablet 1/2 to 1 tid prn muscle spasm, Disp-20 tablet, R-0Normal      fluticasone (FLOVENT HFA) 110 MCG/ACT inhaler INHALE 2 PUFFS BY MOUTH TWICE DAILY, Disp-36 g, R-3Normal      clindamycin (CLEOCIN T) 1 % external solution Apply to affected area BID prn flares NDC:[address]-41, pt prefers this one, Disp-60 mL,R-3, Normal      famotidine (PEPCID) 20 MG tablet Take 20 mg by mouth daily as neededHistorical Med      ibuprofen (ADVIL;MOTRIN) 200 MG tablet Take 200 mg by mouth every 6 hours as needed for PainHistorical Med      Pseudoephedrine HCl (SUDAFED 12 HOUR PO) Take by mouthHistorical Med      azelastine (ASTELIN) 0.1 % nasal spray 1 spray by Nasal route 2 times daily Use in each nostril as directed, Disp-1 Bottle, R-5Normal      albuterol sulfate  (90 Base) MCG/ACT inhaler Inhale 2 puffs into the lungs every 4 hours as needed for Wheezing or Shortness of Breath (or cough), Disp-1 Inhaler, R-4Normal      Psyllium (METAMUCIL PO) Take by mouth      Spacer/Aero-Holding Chambers (E-Z SPACER) CONCEPCION DAILY PRN Starting 6/6/2016, Until Discontinued, Disp-1 Device, R-0, Normal      Omega-3 Fatty Acids (FISH OIL) 1000 MG CPDR Take by mouth 2 times daily Historical Med      Multiple Vitamins-Minerals (PRESERVISION AREDS 2) CAPS Take by mouth Vitamin D (CHOLECALCIFEROL) 1000 UNITS CAPS capsule Take 1,000 Units by mouth daily      Multiple Vitamins-Minerals (CENTRUM SILVER) TABS Take  by mouth daily. cetirizine (ZYRTEC ALLERGY) 10 MG tablet Take 10 mg by mouth daily. Calcium Carbonate-Vitamin D (CALTRATE 600+D PO) Take by mouth 2 times daily Historical Med      aspirin 81 MG chewable tablet Take 81 mg by mouth daily. STOP taking these medications       polyethyl glycol-propyl glycol 0.4-0.3 % (SYSTANE) 0.4-0.3 % ophthalmic solution Comments:   Reason for Stopping:             Activity: activity as tolerated  Diet: regular diet  Wound Care: none needed    Follow-up with Dr. Endy Leal in 2 weeks.     SignedCarollee Mins APRN-CNP 3:32 PM 4/22/2021   [facility]  Office: [phone]   4/22/2021  3:30 PM

## 2021-04-29 ENCOUNTER — OFFICE VISIT (OUTPATIENT)
Dept: FAMILY MEDICINE CLINIC | Age: 76
End: 2021-04-29
Payer: COMMERCIAL

## 2021-04-29 VITALS
RESPIRATION RATE: 17 BRPM | DIASTOLIC BLOOD PRESSURE: 78 MMHG | OXYGEN SATURATION: 98 % | BODY MASS INDEX: 23.86 KG/M2 | HEART RATE: 75 BPM | SYSTOLIC BLOOD PRESSURE: 137 MMHG | WEIGHT: 139 LBS

## 2021-04-29 DIAGNOSIS — K21.9 GASTROESOPHAGEAL REFLUX DISEASE WITHOUT ESOPHAGITIS: ICD-10-CM

## 2021-04-29 DIAGNOSIS — E78.00 PURE HYPERCHOLESTEROLEMIA: ICD-10-CM

## 2021-04-29 DIAGNOSIS — J45.40 MODERATE PERSISTENT REACTIVE AIRWAY DISEASE WITHOUT COMPLICATION: ICD-10-CM

## 2021-04-29 DIAGNOSIS — J30.2 SEASONAL ALLERGIES: ICD-10-CM

## 2021-04-29 DIAGNOSIS — K86.9 PANCREATIC LESION: ICD-10-CM

## 2021-04-29 DIAGNOSIS — M54.50 ACUTE RIGHT-SIDED LOW BACK PAIN WITHOUT SCIATICA: ICD-10-CM

## 2021-04-29 DIAGNOSIS — K56.609 SBO (SMALL BOWEL OBSTRUCTION) (HCC): Primary | ICD-10-CM

## 2021-04-29 PROCEDURE — 99204 OFFICE O/P NEW MOD 45 MIN: CPT | Performed by: INTERNAL MEDICINE

## 2021-04-29 RX ORDER — TIZANIDINE 4 MG/1
TABLET ORAL
Qty: 30 TABLET | Refills: 3 | Status: SHIPPED | OUTPATIENT
Start: 2021-04-29

## 2021-04-29 ASSESSMENT — PATIENT HEALTH QUESTIONNAIRE - PHQ9
SUM OF ALL RESPONSES TO PHQ QUESTIONS 1-9: 0
SUM OF ALL RESPONSES TO PHQ QUESTIONS 1-9: 0
1. LITTLE INTEREST OR PLEASURE IN DOING THINGS: 0
SUM OF ALL RESPONSES TO PHQ9 QUESTIONS 1 & 2: 0
2. FEELING DOWN, DEPRESSED OR HOPELESS: 0
SUM OF ALL RESPONSES TO PHQ QUESTIONS 1-9: 0

## 2021-06-07 DIAGNOSIS — J30.89 SEASONAL ALLERGIC RHINITIS DUE TO OTHER ALLERGIC TRIGGER: ICD-10-CM

## 2021-06-07 RX ORDER — AZELASTINE 1 MG/ML
SPRAY, METERED NASAL
Qty: 1 BOTTLE | Refills: 3 | Status: SHIPPED | OUTPATIENT
Start: 2021-06-07 | End: 2022-02-16

## 2021-06-16 ENCOUNTER — TELEPHONE (OUTPATIENT)
Dept: FAMILY MEDICINE CLINIC | Age: 76
End: 2021-06-16

## 2021-06-17 ENCOUNTER — OFFICE VISIT (OUTPATIENT)
Dept: PULMONOLOGY | Age: 76
End: 2021-06-17
Payer: COMMERCIAL

## 2021-06-17 VITALS
WEIGHT: 139 LBS | BODY MASS INDEX: 23.73 KG/M2 | TEMPERATURE: 97 F | HEART RATE: 98 BPM | OXYGEN SATURATION: 97 % | DIASTOLIC BLOOD PRESSURE: 72 MMHG | RESPIRATION RATE: 14 BRPM | HEIGHT: 64 IN | SYSTOLIC BLOOD PRESSURE: 138 MMHG

## 2021-06-17 DIAGNOSIS — J45.40 REACTIVE AIRWAY DISEASE, MODERATE PERSISTENT, UNCOMPLICATED: Primary | ICD-10-CM

## 2021-06-17 DIAGNOSIS — J30.89 SEASONAL ALLERGIC RHINITIS DUE TO OTHER ALLERGIC TRIGGER: ICD-10-CM

## 2021-06-17 PROCEDURE — 99214 OFFICE O/P EST MOD 30 MIN: CPT | Performed by: INTERNAL MEDICINE

## 2021-06-17 NOTE — PROGRESS NOTES
Chief complaint  This is a 67y.o. year old female  who comes to see me with a chief complaint of   Chief Complaint   Patient presents with    Other     f/u RAD       HPI  Here with cc on follow up on asthma/RADs. She is doing well. Uses albuterol once this past weekend. Continues with flovent bid, singulair and astelin. Vacuuming historically makes her the most SOB along with humidity. Was admitted back in April for SBO. Found to have a pancreatic lesion. Going for EUS at the end of the month. She is well otherwise.   Her albuterol inhalers are going to be  in August.  She has not really used them that much           Current Outpatient Medications   Medication Sig Dispense Refill    azelastine (ASTELIN) 0.1 % nasal spray USE 1 SPRAY IN EACH NOSTRIL TWICE DAILY AS DIRECTED 1 Bottle 3    tiZANidine (ZANAFLEX) 4 MG tablet 1/2 to 1 tid prn muscle spasm 30 tablet 3    montelukast (SINGULAIR) 10 MG tablet Take 1 tablet by mouth nightly 90 tablet 3    fluticasone (FLOVENT HFA) 110 MCG/ACT inhaler INHALE 2 PUFFS BY MOUTH TWICE DAILY 36 g 3    clindamycin (CLEOCIN T) 1 % external solution Apply to affected area BID prn flares NDC:[address]-23, pt prefers this one 60 mL 3    famotidine (PEPCID) 20 MG tablet Take 20 mg by mouth daily       ibuprofen (ADVIL;MOTRIN) 200 MG tablet Take 200 mg by mouth every 6 hours as needed for Pain      Pseudoephedrine HCl (SUDAFED 12 HOUR PO) Take by mouth      albuterol sulfate  (90 Base) MCG/ACT inhaler Inhale 2 puffs into the lungs every 4 hours as needed for Wheezing or Shortness of Breath (or cough) 1 Inhaler 4    Psyllium (METAMUCIL PO) Take by mouth      Spacer/Aero-Holding Chambers (E-Z SPACER) CONCEPCION 1 Device by Does not apply route daily as needed 1 Device 0    Omega-3 Fatty Acids (FISH OIL) 1000 MG CPDR Take by mouth 2 times daily       Multiple Vitamins-Minerals (PRESERVISION AREDS 2) CAPS Take by mouth      Vitamin D (CHOLECALCIFEROL) 1000 minute   ventilation likely related to some tachypnea but other wise   normal. Ventilatory efficiency for Co 2 and O 2 were within   normal limits with no obvious dead space ventilation and no Co 2   retention post exercise. ABG post exercise normal with normal A-a   gradient. Correlate clinically    Note: Patient stopped test due to legs could not pedal anymore   and Shortness of breath. Total IgE Level:  60  Significant allergies:  Cow's milk    ASTHMA CONTROL TEST 2020 2020 2019 3/26/2019 2018 3/26/2018 2017   In the past 4 weeks, how much of the time did your asthma keep you from getting as much done at work, school or at home? 5 5 5 5 3 5 4   During the past 4 weeks, how often have you had shortness of breath? 4 4 4 5 3 4 4   During the past 4 weeks, how often did your asthma symptoms (wheezing, coughing, shortness of breath, chest tightness or pain) wake you up at night or earlier than usual in the morning? 5 5 5 5 5 5 5   During the past 4 weeks, how often have you used your rescue inhaler or nebulizer medication (such as albuterol)? 5 4 5 5 2 2 2   How would you rate your asthma control during the past 4 weeks? 5 4 5 5 4 5 4   Asthma Control Test Total Score 24 22 24 25 17 21 19       Assessment/Plan:  1. Reactive airway disease, moderate persistent, uncomplicated  Continue with flovent and prn albuterol. Probably ok to use  albuterol     2.  Seasonal allergic rhinitis due to other allergic trigger  singulair and astelin        Follow up in 6 months

## 2021-06-29 ENCOUNTER — ANESTHESIA EVENT (OUTPATIENT)
Dept: ENDOSCOPY | Age: 76
End: 2021-06-29
Payer: COMMERCIAL

## 2021-06-29 NOTE — PROGRESS NOTES
[facility] time__12noon__________        Surgery time_____1330_______    Take the following medications with a sip of water: Follow your MD/Surgeons pre-procedure instructions regarding your medications  Do not eat or drink anything after 12:00 midnight prior to your surgery. This includes water chewing gum, mints and ice chips. You may brush your teeth and gargle the morning of your surgery, but do not swallow the water     Please see your family doctor/pediatrician for a history and physical and/or concerning medications. Bring any test results/reports from your physicians office. If you are under the care of a heart doctor or specialist doctor, please be aware that you may be asked to them for clearance    You may be asked to stop blood thinners such as Coumadin, Plavix, Fragmin, Lovenox, etc., or any anti-inflammatories such as:  Aspirin, Ibuprofen, Advil, Naproxen prior to your surgery. We also ask that you stop any OTC medications such as fish oil, vitamin E, glucosamine, garlic, Multivitamins, COQ 10, etc. May take tylenol    We ask that you do not smoke 24 hours prior to surgery  We ask that you do not  drink any alcoholic beverages 24 hours prior to surgery     You must make arrangements for a responsible adult to take you home after your surgery. For your safety you will not be allowed to leave alone or drive yourself home. Your surgery will be cancelled if you do not have a ride home. Also for your safety, it is strongly suggested that someone stay with you the first 24 hours after your surgery. A parent or legal guardian must accompany a child scheduled for surgery and plan to stay at the hospital until the child is discharged. Please do not bring other children with you. For your comfort, please wear simple loose fitting clothing to the hospital.  Please do not bring valuables.     Do not wear any make-up or nail polish on your fingers or toes      For your safety, please do not wear any jewelry or body piercing's on the day of surgery. All jewelry must be removed. If you have dentures, they will be removed before going to operating room. For your convenience, we will provide you with a container. If you wear contact lenses or glasses, they will be removed, please bring a case for them. If you have a living will and a durable power of  for healthcare, please bring in a copy. As part of our patient safety program to minimize surgical site infections, we ask you to do the following:    · Please notify your surgeon if you develop any illness between         now and the  day of your surgery. · This includes a cough, cold, fever, sore throat, nausea,         or vomiting, and diarrhea, etc.  ·  Please notify your surgeon if you experience dizziness, shortness         of breath or blurred vision between now and the time of your surgery. Do not shave your operative site 96 hours prior to surgery. For face and neck surgery, men may use an electric razor 48 hours   prior to surgery. You may shower the night before surgery or the morning of   your surgery with an antibacterial soap. You will need to bring a photo ID and insurance card    [facility] has an onsite pharmacy, would you like to utilize our pharmacy     If you will be staying overnight and use a C-pap machine, please bring   your C-pap to hospital     Our goal is to provide you with excellent care, therefore, visitors will be limited to two(2) in the room at a time so that we may focus on providing this care for you. Please contact pre-admission testing if you have any further questions. [facility] phone number:  [phone] PAT fax number:  [phone]  Please note these are generalized instructions for all surgical cases, you may be provided with more specific instructions according to your surgery.     SAFETY FIRST. .call before you fall

## 2021-06-30 ENCOUNTER — ANESTHESIA (OUTPATIENT)
Dept: ENDOSCOPY | Age: 76
End: 2021-06-30
Payer: COMMERCIAL

## 2021-06-30 ENCOUNTER — HOSPITAL ENCOUNTER (OUTPATIENT)
Age: 76
Setting detail: OUTPATIENT SURGERY
Discharge: HOME | End: 2021-06-30
Attending: INTERNAL MEDICINE | Admitting: INTERNAL MEDICINE
Payer: COMMERCIAL

## 2021-06-30 VITALS
TEMPERATURE: 97 F | RESPIRATION RATE: 14 BRPM | DIASTOLIC BLOOD PRESSURE: 75 MMHG | HEIGHT: 64 IN | WEIGHT: 137.57 LBS | BODY MASS INDEX: 23.49 KG/M2 | OXYGEN SATURATION: 97 % | SYSTOLIC BLOOD PRESSURE: 143 MMHG | HEART RATE: 75 BPM

## 2021-06-30 VITALS — OXYGEN SATURATION: 97 % | DIASTOLIC BLOOD PRESSURE: 53 MMHG | SYSTOLIC BLOOD PRESSURE: 92 MMHG | TEMPERATURE: 98.6 F

## 2021-06-30 DIAGNOSIS — K86.2 PANCREATIC CYST: ICD-10-CM

## 2021-06-30 PROCEDURE — 3700000001 HC ADD 15 MINUTES (ANESTHESIA): Performed by: INTERNAL MEDICINE

## 2021-06-30 PROCEDURE — 7100000011 HC PHASE II RECOVERY - ADDTL 15 MIN: Performed by: INTERNAL MEDICINE

## 2021-06-30 PROCEDURE — 3700000000 HC ANESTHESIA ATTENDED CARE: Performed by: INTERNAL MEDICINE

## 2021-06-30 PROCEDURE — 7100000010 HC PHASE II RECOVERY - FIRST 15 MIN: Performed by: INTERNAL MEDICINE

## 2021-06-30 PROCEDURE — 2580000003 HC RX 258: Performed by: ANESTHESIOLOGY

## 2021-06-30 PROCEDURE — 6360000002 HC RX W HCPCS: Performed by: NURSE ANESTHETIST, CERTIFIED REGISTERED

## 2021-06-30 PROCEDURE — 2709999900 HC NON-CHARGEABLE SUPPLY: Performed by: INTERNAL MEDICINE

## 2021-06-30 PROCEDURE — 6370000000 HC RX 637 (ALT 250 FOR IP)

## 2021-06-30 PROCEDURE — 3609012400 HC EGD TRANSORAL BIOPSY SINGLE/MULTIPLE: Performed by: INTERNAL MEDICINE

## 2021-06-30 PROCEDURE — 2500000003 HC RX 250 WO HCPCS: Performed by: NURSE ANESTHETIST, CERTIFIED REGISTERED

## 2021-06-30 PROCEDURE — 88305 TISSUE EXAM BY PATHOLOGIST: CPT

## 2021-06-30 PROCEDURE — 7100000001 HC PACU RECOVERY - ADDTL 15 MIN: Performed by: INTERNAL MEDICINE

## 2021-06-30 PROCEDURE — 7100000000 HC PACU RECOVERY - FIRST 15 MIN: Performed by: INTERNAL MEDICINE

## 2021-06-30 PROCEDURE — 3609018500 HC EGD US SCOPE W/ADJACENT STRUCTURES: Performed by: INTERNAL MEDICINE

## 2021-06-30 RX ORDER — LIDOCAINE HYDROCHLORIDE 20 MG/ML
INJECTION, SOLUTION EPIDURAL; INFILTRATION; INTRACAUDAL; PERINEURAL PRN
Status: DISCONTINUED | OUTPATIENT
Start: 2021-06-30 | End: 2021-06-30 | Stop reason: SDUPTHER

## 2021-06-30 RX ORDER — SODIUM CHLORIDE 9 MG/ML
INJECTION, SOLUTION INTRAVENOUS CONTINUOUS
Status: DISCONTINUED | OUTPATIENT
Start: 2021-06-30 | End: 2021-06-30 | Stop reason: HOSPADM

## 2021-06-30 RX ORDER — SODIUM CHLORIDE 9 MG/ML
25 INJECTION, SOLUTION INTRAVENOUS PRN
Status: DISCONTINUED | OUTPATIENT
Start: 2021-06-30 | End: 2021-06-30 | Stop reason: HOSPADM

## 2021-06-30 RX ORDER — SODIUM CHLORIDE 0.9 % (FLUSH) 0.9 %
10 SYRINGE (ML) INJECTION PRN
Status: DISCONTINUED | OUTPATIENT
Start: 2021-06-30 | End: 2021-06-30 | Stop reason: HOSPADM

## 2021-06-30 RX ORDER — PROPOFOL 10 MG/ML
INJECTION, EMULSION INTRAVENOUS CONTINUOUS PRN
Status: DISCONTINUED | OUTPATIENT
Start: 2021-06-30 | End: 2021-06-30 | Stop reason: SDUPTHER

## 2021-06-30 RX ORDER — SODIUM CHLORIDE 0.9 % (FLUSH) 0.9 %
10 SYRINGE (ML) INJECTION EVERY 12 HOURS SCHEDULED
Status: DISCONTINUED | OUTPATIENT
Start: 2021-06-30 | End: 2021-06-30 | Stop reason: HOSPADM

## 2021-06-30 RX ORDER — PANTOPRAZOLE SODIUM 40 MG/1
40 TABLET, DELAYED RELEASE ORAL
Qty: 30 TABLET | Refills: 5 | Status: SHIPPED | OUTPATIENT
Start: 2021-06-30

## 2021-06-30 RX ADMIN — SODIUM CHLORIDE: 9 INJECTION, SOLUTION INTRAVENOUS at 12:29

## 2021-06-30 RX ADMIN — PROPOFOL 200 MCG/KG/MIN: 10 INJECTION, EMULSION INTRAVENOUS at 13:17

## 2021-06-30 RX ADMIN — LIDOCAINE HYDROCHLORIDE 50 MG: 20 INJECTION, SOLUTION EPIDURAL; INFILTRATION; INTRACAUDAL; PERINEURAL at 13:17

## 2021-06-30 ASSESSMENT — PULMONARY FUNCTION TESTS
PIF_VALUE: 1

## 2021-06-30 ASSESSMENT — PAIN SCALES - GENERAL
PAINLEVEL_OUTOF10: 0

## 2021-06-30 ASSESSMENT — PAIN - FUNCTIONAL ASSESSMENT: PAIN_FUNCTIONAL_ASSESSMENT: 0-10

## 2021-06-30 ASSESSMENT — LIFESTYLE VARIABLES: SMOKING_STATUS: 0

## 2021-06-30 NOTE — OP NOTE
[address] [place] [address]  Endoscopy Note    Patient: Gayle Hinojosa   : 1945  Acct#:     Procedure: Endoscopic ultrasound  Doppler of mesenteric vessels  EGD with biopsy    Date: 2021    Surgeon:  Ida Salazar MD, MD    Referring Physician:  Dr. Yash Ahumada and Dr. Sarah Campbell    Anesthesia:  MAC per Anesthesia. Indications: This is a 67y.o. year old female who presents today with dyspepsia. Found incidentally on CT done for a SBO was a 1.2cm pancreatic head cystic lesion. Consent: Risks, benefits, and alternatives were explained and informed consent was obtained. Monitoring:  Patient was monitored with continuous pulse oximetry, telemetry, and intermittent blood pressures. Details of the Procedure: The patient was then taken to the endoscopy suite. A time-out was performed. The patient and staff were in agreement as to the correct patient and procedure. The above anesthesia was administered by the anesthesia department. The patient was placed in the left lateral position. The Olympus videoendoscope was placed in the patient's mouth and under direct visualization passed into the esophagus and advanced without difficulty to the 2nd portion of the duodenum. Views were good, patient toleration was good. Retroflexion was performed in the stomach. Findings:  1. The esophagus appeared normal without evidence of Suazo's esophagus or reflux esophagitis. 2.  2cm sliding hiatal hernia. 3.  There was mild antral gastritis. Biopsies were obtained from the antrum and body of the stomach to evaluate for H. Pylori. 4. Normal duodenum. Next, the curvilinear array echoendoscope was advanced without difficulty to the 2nd portion of the duodenum. Endosonographic views were good, patient toleration was good. Findings:   Major Papilla: Endoscopically, the major papilla was normal.  Endosonographically, the major papilla appeared normal  Pancreas:  The pancreatic duct measured 3.0mm

## 2021-06-30 NOTE — PROGRESS NOTES
Patient received from PACU awake, alert, abd soft, non-tender. VSS. Snack provided, call light within reach.

## 2021-06-30 NOTE — ANESTHESIA PRE PROCEDURE
[facility] Department of Anesthesiology  Pre-Anesthesia Evaluation/Consultation       Name:  Judge Eden  : 1945  Age:  67 y.o. MRN:  [id number]  Date: 2021           Surgeon: Surgeon(s):  Sheryl Jonas MD    Procedure: Procedure(s):  ENDOSCOPIC ULTRASOUND OF PANCREAS     Allergies   Allergen Reactions    Cyclobenzaprine Other (See Comments)     Patient states it causes her muscles not to work     Patient Active Problem List   Diagnosis    Seasonal allergies    Hyperlipidemia    Actinic keratosis    Mitral valve prolapse    RAD (reactive airway disease)    Macular degeneration    Osteopenia    [facility] obstruction[facility])     Past Medical History:   Diagnosis Date    Actinic keratosis     goes to derm annually    Arthritis     jaws    Cataract mild    Difficult intubation     related to small airway    Diverticulosis     Dry eye     Hiatal hernia     Hyperlipidemia     Macular degeneration     dr Lesa Driscoll    Mitral valve prolapse     diagnosed [facility] early . asymptomatic    Osteopenia     RAD (reactive airway disease)     treated by dr Jose Ramno Mon.  Seasonal allergies     Small bowel obstruction ([address] 2021    resolved     Past Surgical History:   Procedure Laterality Date    APPENDECTOMY      COLONOSCOPY      x2    DILATION AND CURETTAGE OF UTERUS          EYE SURGERY      to remove ptyergium    HYSTERECTOMY      complete    COCO AND BSO      secondary to fibroids    TUBAL LIGATION       Social History     Tobacco Use    Smoking status: Never Smoker    Smokeless tobacco: Never Used   Vaping Use    Vaping Use: Never used   Substance Use Topics    Alcohol use: Yes     Alcohol/week: 0.0 standard drinks     Comment: rarely    Drug use: Never     Medications  No current facility-administered medications on file prior to encounter.      Current Outpatient Medications on File Prior to Encounter   Medication Sig Dispense Refill    tiZANidine (ZANAFLEX) 4 MG tablet 1/2 to 1 tid prn muscle spasm 30 tablet 3    montelukast (SINGULAIR) 10 MG tablet Take 1 tablet by mouth nightly 90 tablet 3    fluticasone (FLOVENT HFA) 110 MCG/ACT inhaler INHALE 2 PUFFS BY MOUTH TWICE DAILY 36 g 3    clindamycin (CLEOCIN T) 1 % external solution Apply to affected area BID prn flares NDC:[address]-54, pt prefers this one 60 mL 3    famotidine (PEPCID) 20 MG tablet Take 20 mg by mouth daily       ibuprofen (ADVIL;MOTRIN) 200 MG tablet Take 200 mg by mouth every 6 hours as needed for Pain      Pseudoephedrine HCl (SUDAFED 12 HOUR PO) Take 1 capsule by mouth as needed       albuterol sulfate  (90 Base) MCG/ACT inhaler Inhale 2 puffs into the lungs every 4 hours as needed for Wheezing or Shortness of Breath (or cough) 1 Inhaler 4    Psyllium (METAMUCIL PO) Take 1 Scoop by mouth daily       Omega-3 Fatty Acids (FISH OIL) 1000 MG CPDR Take by mouth 2 times daily       Multiple Vitamins-Minerals (PRESERVISION AREDS 2) CAPS Take 1 capsule by mouth 2 times daily       Vitamin D (CHOLECALCIFEROL) 1000 UNITS CAPS capsule Take 1,000 Units by mouth daily      Multiple Vitamins-Minerals (CENTRUM SILVER) TABS Take  by mouth daily.  cetirizine (ZYRTEC ALLERGY) 10 MG tablet Take 10 mg by mouth daily.  Calcium Carbonate-Vitamin D (CALTRATE 600+D PO) Take by mouth 2 times daily       aspirin 81 MG chewable tablet Take 81 mg by mouth daily.       Spacer/Aero-Holding Chambers (E-Z SPACER) CONCEPCION 1 Device by Does not apply route daily as needed 1 Device 0     Current Facility-Administered Medications   Medication Dose Route Frequency Provider Last Rate Last Admin    0.9 % sodium chloride infusion   Intravenous Continuous Margi Castro MD        sodium chloride flush 0.9 % injection 10 mL  10 mL Intravenous 2 times per day Margi Castro MD        sodium chloride flush 0.9 % injection 10 mL  10 mL Intravenous EVON Kelsey MD        0.9 % sodium chloride infusion  25 mL Intravenous EVON Kelsey MD         Vital Signs (Current)   Vitals:    06/29/21 1159 06/30/21 1204   Weight: 140 lb (63.5 kg) 137 lb 9.1 oz (62.4 kg)   Height: 5' 4\" (1.626 m) 5' 4\" (1.626 m)                                            Vital Signs Statistics (for past 48 hrs)     No data recorded  BP Readings from Last 3 Encounters:   06/17/21 138/72   04/29/21 137/78   04/14/21 (!) 149/76       BMI  Body mass index is 23.61 kg/m². Estimated body mass index is 23.61 kg/m² as calculated from the following:    Height as of this encounter: 5' 4\" (1.626 m). Weight as of this encounter: 137 lb 9.1 oz (62.4 kg). CBC   Lab Results   Component Value Date    WBC 7.8 04/12/2021    RBC 4.29 04/12/2021    HGB 12.4 04/12/2021    HCT 36.5 04/12/2021    MCV 85.2 04/12/2021    RDW 13.7 04/12/2021     04/12/2021     CMP    Lab Results   Component Value Date     04/12/2021    K 3.6 04/12/2021    K 3.3 04/11/2021     04/12/2021    CO2 25 04/12/2021    BUN 9 04/12/2021    CREATININE <0.5 04/12/2021    GFRAA >60 04/12/2021    AGRATIO 1.7 04/11/2021    LABGLOM >60 04/12/2021    GLUCOSE 130 04/12/2021    PROT 7.0 04/11/2021    CALCIUM 9.1 04/12/2021    BILITOT 0.3 04/11/2021    ALKPHOS 102 04/11/2021    AST 25 04/11/2021    ALT 23 04/11/2021     BMP    Lab Results   Component Value Date     04/12/2021    K 3.6 04/12/2021    K 3.3 04/11/2021     04/12/2021    CO2 25 04/12/2021    BUN 9 04/12/2021    CREATININE <0.5 04/12/2021    CALCIUM 9.1 04/12/2021    GFRAA >60 04/12/2021    LABGLOM >60 04/12/2021    GLUCOSE 130 04/12/2021     POCGlucose  No results for input(s): GLUCOSE in the last 72 hours.    Coags  No results found for: PROTIME, INR, APTT  HCG (If Applicable) No results found for: PREGTESTUR, PREGSERUM, HCG, HCGQUANT   ABGs No results found for: PHART, PO2ART, LHZ5JRM, NAT6ORO, BEART, B3BRSHJC   Type & Screen (If Applicable)  No results found for: LABABO, LABRH                         BMI: Wt Readings from Last 3 Encounters:       NPO Status:  >8h                          Anesthesia Evaluation  Patient summary reviewed   history of anesthetic complications: difficult airway. Airway: Mallampati: II  TM distance: >3 FB   Neck ROM: full  Mouth opening: > = 3 FB Dental: normal exam         Pulmonary: breath sounds clear to auscultation  (+) asthma:     (-) recent URI, sleep apnea and not a current smoker                           Cardiovascular:  Exercise tolerance: good (>4 METS),   (+) valvular problems/murmurs: MVP,     (-) hypertension, CABG/stent,  angina and no hyperlipidemia        Rate: normal                    Neuro/Psych:      (-) seizures, TIA and CVA           GI/Hepatic/Renal:   (+) hiatal hernia,           Endo/Other:        (-) diabetes mellitus, hypothyroidism               Abdominal:             Vascular:     - DVT and PE. Other Findings:             Anesthesia Plan      MAC     ASA 3       Induction: intravenous. Anesthetic plan and risks discussed with patient. Plan discussed with CRNA. This pre-anesthesia assessment may be used as a history and physical.    DOS STAFF ADDENDUM:    Pt seen and examined, chart reviewed (including anesthesia, drug and allergy history). No interval changes to history and physical examination. Anesthetic plan, risks, benefits, alternatives, and personnel involved discussed with patient. Patient verbalized an understanding and agrees to proceed.       Marquez Weathers MD  June 30, 2021  12:07 PM

## 2021-06-30 NOTE — H&P
[address] [place] [address]   Pre-operative History and Physical    Patient: Juanpablo Murrell  : 1945  Acct#:     HISTORY OF PRESENT ILLNESS:    The patient is a 67 y.o. female who presents with dyspepsia. Found incidentally on CT done for a SBO was a 1.2cm pancreatic head cystic lesion    Past Medical History:        Diagnosis Date    Actinic keratosis     goes to derm annually    Arthritis     jaws    Cataract mild    Difficult intubation     related to small airway    Diverticulosis     Dry eye     Hiatal hernia     Hyperlipidemia     Macular degeneration     dr Rene Hawkins    Mitral valve prolapse     diagnosed [facility] early . asymptomatic    Osteopenia     RAD (reactive airway disease)     treated by dr Lori Urbina.  Seasonal allergies     Small bowel obstruction ([facility] [address] 2021    resolved      Past Surgical History:        Procedure Laterality Date    APPENDECTOMY      COLONOSCOPY      x2    DILATION AND CURETTAGE OF UTERUS          EYE SURGERY      to remove ptyergium    HYSTERECTOMY      complete    COCO AND BSO      secondary to fibroids    TUBAL LIGATION        Medications Prior to Admission:   No current facility-administered medications on file prior to encounter.      Current Outpatient Medications on File Prior to Encounter   Medication Sig Dispense Refill    tiZANidine (ZANAFLEX) 4 MG tablet 1/2 to 1 tid prn muscle spasm 30 tablet 3    montelukast (SINGULAIR) 10 MG tablet Take 1 tablet by mouth nightly 90 tablet 3    fluticasone (FLOVENT HFA) 110 MCG/ACT inhaler INHALE 2 PUFFS BY MOUTH TWICE DAILY 36 g 3    famotidine (PEPCID) 20 MG tablet Take 20 mg by mouth daily       ibuprofen (ADVIL;MOTRIN) 200 MG tablet Take 200 mg by mouth every 6 hours as needed for Pain      Pseudoephedrine HCl (SUDAFED 12 HOUR PO) Take 1 capsule by mouth as needed       albuterol sulfate  (90 Base) MCG/ACT inhaler Inhale 2 puffs into the lungs every 4 hours as needed for Wheezing or Shortness of Breath (or cough) 1 Inhaler 4    Psyllium (METAMUCIL PO) Take 1 Scoop by mouth daily       Omega-3 Fatty Acids (FISH OIL) 1000 MG CPDR Take by mouth 2 times daily       Multiple Vitamins-Minerals (PRESERVISION AREDS 2) CAPS Take 1 capsule by mouth 2 times daily       Vitamin D (CHOLECALCIFEROL) 1000 UNITS CAPS capsule Take 1,000 Units by mouth daily      Multiple Vitamins-Minerals (CENTRUM SILVER) TABS Take  by mouth daily.  cetirizine (ZYRTEC ALLERGY) 10 MG tablet Take 10 mg by mouth daily.  Calcium Carbonate-Vitamin D (CALTRATE 600+D PO) Take by mouth 2 times daily       aspirin 81 MG chewable tablet Take 81 mg by mouth daily.  clindamycin (CLEOCIN T) 1 % external solution Apply to affected area BID prn flares [id number], pt prefers this one 60 mL 3    Spacer/Aero-Holding Chambers (E-Z SPACER) CONCEPCION 1 Device by Does not apply route daily as needed 1 Device 0        Allergies:  Cyclobenzaprine    Social History:   Social History     Socioeconomic History    Marital status:      Spouse name: Not on file    Number of children: Not on file    Years of education: Not on file    Highest education level: Not on file   Occupational History    Occupation: Retired   Tobacco Use    Smoking status: Never Smoker    Smokeless tobacco: Never Used   Vaping Use    Vaping Use: Never used   Substance and Sexual Activity    Alcohol use: Yes     Alcohol/week: 0.0 standard drinks     Comment: rarely    Drug use: Never    Sexual activity: Never     Comment:  12/6/2010   Other Topics Concern    Not on file   Social History Narrative    Not on file     Social Determinants of Health     Financial Resource Strain:     Difficulty of Paying Living Expenses:    Food Insecurity:     Worried About Running Out of Food in the Last Year:     [address] in the Last Year:    Transportation Needs:     Lack of Transportation (Medical):      Lack of Transportation (Non-Medical):    Physical Activity:     Days of Exercise per Week:     Minutes of Exercise per Session:    Stress:     Feeling of Stress :    Social Connections:     Frequency of Communication with Friends and Family:     Frequency of Social Gatherings with Friends and Family:     Attends Yazidi Services:     Active Member of Clubs or Organizations:     Attends Club or Organization Meetings:     Marital Status:    Intimate Partner Violence:     Fear of Current or Ex-Partner:     Emotionally Abused:     Physically Abused:     Sexually Abused:       Family History:       Problem Relation Age of Onset    Cancer Mother 66        lung (was a smoker)    Heart Disease Mother     Diabetes Father     High Blood Pressure Sister     High Blood Pressure Sister     Dementia Sister         PARTIALLY    Diabetes Maternal Aunt     Diabetes Paternal Uncle         PHYSICAL EXAM:      /72   Pulse 77   Temp 98.2 °F (36.8 °C) (Temporal)   Resp 16   Ht 5' 4\" (1.626 m)   Wt 137 lb 9.1 oz (62.4 kg)   SpO2 99%   BMI 23.61 kg/m²  I        Heart:  RRR    Lungs:  CTA b    Abdomen:  S/NT/ND/+BS      ASSESSMENT AND PLAN:  ASA: per anesthesia  Mallampati: per anesthesia  1. Patient is a 67 y.o. female here for EGD and EUS   2. Procedure options, risks and benefits reviewed with the patient. The patient expresses understanding.     Betito Guerra

## 2021-07-23 ENCOUNTER — HOSPITAL ENCOUNTER (OUTPATIENT)
Dept: WOMENS IMAGING | Age: 76
Discharge: HOME | End: 2021-07-23
Payer: COMMERCIAL

## 2021-07-23 DIAGNOSIS — Z12.31 VISIT FOR SCREENING MAMMOGRAM: ICD-10-CM

## 2021-07-23 PROCEDURE — 77063 BREAST TOMOSYNTHESIS BI: CPT

## 2021-07-29 ENCOUNTER — OFFICE VISIT (OUTPATIENT)
Dept: FAMILY MEDICINE CLINIC | Age: 76
End: 2021-07-29
Payer: COMMERCIAL

## 2021-07-29 VITALS
WEIGHT: 141.2 LBS | TEMPERATURE: 96.4 F | SYSTOLIC BLOOD PRESSURE: 125 MMHG | RESPIRATION RATE: 16 BRPM | HEART RATE: 73 BPM | OXYGEN SATURATION: 96 % | BODY MASS INDEX: 24.24 KG/M2 | DIASTOLIC BLOOD PRESSURE: 66 MMHG

## 2021-07-29 DIAGNOSIS — Z00.00 ENCOUNTER FOR ANNUAL WELLNESS VISIT (AWV) IN MEDICARE PATIENT: Primary | ICD-10-CM

## 2021-07-29 PROCEDURE — G0439 PPPS, SUBSEQ VISIT: HCPCS | Performed by: NURSE PRACTITIONER

## 2021-07-29 ASSESSMENT — LIFESTYLE VARIABLES
HOW OFTEN DO YOU HAVE A DRINK CONTAINING ALCOHOL: NEVER
HOW OFTEN DO YOU HAVE A DRINK CONTAINING ALCOHOL: 0
AUDIT TOTAL SCORE: INCOMPLETE
AUDIT-C TOTAL SCORE: INCOMPLETE

## 2021-07-29 ASSESSMENT — PATIENT HEALTH QUESTIONNAIRE - PHQ9
SUM OF ALL RESPONSES TO PHQ QUESTIONS 1-9: 0
1. LITTLE INTEREST OR PLEASURE IN DOING THINGS: 0
SUM OF ALL RESPONSES TO PHQ9 QUESTIONS 1 & 2: 0
SUM OF ALL RESPONSES TO PHQ QUESTIONS 1-9: 0
SUM OF ALL RESPONSES TO PHQ QUESTIONS 1-9: 0
2. FEELING DOWN, DEPRESSED OR HOPELESS: 0

## 2021-07-29 NOTE — PROGRESS NOTES
Medicare Annual Wellness Visit  Are Name: Adolfo Leos Date: 2021   MRN: [id number] Sex: Female   Age: 67 y.o. Ethnicity: Non- / Non    : 1945 Race: White (non-)      Hanna Pollock is here for Medicare AWV    Screenings for behavioral, psychosocial and functional/safety risks, and cognitive dysfunction are all negative except as indicated below. These results, as well as other patient data from the [address] [facility] [place] [address] form, are documented in Flowsheets linked to this Encounter. Allergies   Allergen Reactions    Cyclobenzaprine Other (See Comments)     Patient states it causes her muscles not to work       Prior to Visit Medications    Medication Sig Taking?  Authorizing Provider   pantoprazole (PROTONIX) 40 MG tablet Take 1 tablet by mouth every morning (before breakfast) Yes Denise Dunn MD   azelastine (ASTELIN) 0.1 % nasal spray USE 1 SPRAY IN EACH NOSTRIL TWICE DAILY AS DIRECTED  Patient taking differently: 1 spray by Nasal route 2 times daily  Yes Qi Mcleod DO   tiZANidine (ZANAFLEX) 4 MG tablet 1/2 to 1 tid prn muscle spasm Yes Angela Vides MD   montelukast (SINGULAIR) 10 MG tablet Take 1 tablet by mouth nightly Yes Qi Mcleod DO   fluticasone (FLOVENT HFA) 110 MCG/ACT inhaler INHALE 2 PUFFS BY MOUTH TWICE DAILY Yes Qi Mcleod DO   clindamycin (CLEOCIN T) 1 % external solution Apply to affected area BID prn flares [id number], pt prefers this one Yes Ирина Hirsch MD   ibuprofen (ADVIL;MOTRIN) 200 MG tablet Take 200 mg by mouth every 6 hours as needed for Pain Yes Historical Provider, MD   Pseudoephedrine HCl (SUDAFED 12 HOUR PO) Take 1 capsule by mouth as needed  Yes Historical Provider, MD   albuterol sulfate  (90 Base) MCG/ACT inhaler Inhale 2 puffs into the lungs every 4 hours as needed for Wheezing or Shortness of Breath (or cough) Yes Qi Mcleod DO Psyllium (METAMUCIL PO) Take 1 Scoop by mouth daily  Yes Historical Provider, MD   Spacer/Aero-Holding Chambers (E-Z SPACER) CONCEPCION 1 Device by Does not apply route daily as needed Yes Peterson Venegas DO   Omega-3 Fatty Acids (FISH OIL) 1000 MG CPDR Take by mouth 2 times daily  Yes Historical Provider, MD   Multiple Vitamins-Minerals (PRESERVISION AREDS 2) CAPS Take 1 capsule by mouth 2 times daily  Yes Historical Provider, MD   Vitamin D (CHOLECALCIFEROL) 1000 UNITS CAPS capsule Take 1,000 Units by mouth daily Yes Historical Provider, MD   Multiple Vitamins-Minerals (CENTRUM SILVER) TABS Take  by mouth daily. Yes Historical Provider, MD   cetirizine (ZYRTEC ALLERGY) 10 MG tablet Take 10 mg by mouth daily. Yes Historical Provider, MD   Calcium Carbonate-Vitamin D (CALTRATE 600+D PO) Take by mouth 2 times daily  Yes Historical Provider, MD   aspirin 81 MG chewable tablet Take 81 mg by mouth daily. Yes Historical Provider, MD       Past Medical History:   Diagnosis Date    Actinic keratosis     goes to derm annually    Arthritis     jaws    Cataract mild    Difficult intubation     related to small airway    Diverticulosis     Dry eye     Hiatal hernia     Hyperlipidemia     Macular degeneration     dr Buitrago Current Mitral valve prolapse     diagnosed [facility] early 1990s. asymptomatic    Osteopenia     RAD (reactive airway disease)     treated by dr Elenita Hardin.     Seasonal allergies     Small bowel obstruction ([facility] [address] 04/2021    resolved       Past Surgical History:   Procedure Laterality Date    APPENDECTOMY  1948    COLONOSCOPY      x2    DILATION AND CURETTAGE OF UTERUS      1979    EYE SURGERY      to remove ptyergium    HYSTERECTOMY      complete    COCO AND BSO  1989    secondary to fibroids    TUBAL LIGATION  1979    UPPER GASTROINTESTINAL ENDOSCOPY N/A 6/30/2021    ENDOSCOPIC ULTRASOUND OF PANCREAS performed by Darrall Sever, MD at [address] ENDOSCOPY  6/30/2021    EGD BIOPSY performed by Marce Luo MD at [address] [facility] History   Problem Relation Age of Onset    Cancer Mother 66        lung (was a smoker)    Heart Disease Mother     Diabetes Father     High Blood Pressure Sister     High Blood Pressure Sister     Dementia Sister         PARTIALLY    Diabetes Maternal Aunt     Diabetes Paternal Uncle        CareTeam (Including outside providers/suppliers regularly involved in providing care):   Patient Care Team:  Doug Lomax MD as PCP - General (Internal Medicine)  Doug Lomax MD as PCP - [facility]  Yaquelin Art MD as Consulting Physician (Obstetrics & Gynecology)  Héctor Shields MD as Consulting Physician (Ophthalmology)  Munir Garcia MD as Surgeon (Orthopedic Surgery)  Italo Winchester MD as Consulting Physician (Gastroenterology)  Kamilah Guaman DO as Consulting Physician (Pulmonology)  Elliott Jurado MD (Dermatology)    Wt Readings from Last 3 Encounters:   07/29/21 141 lb 3.2 oz (64 kg)   06/30/21 137 lb 9.1 oz (62.4 kg)   06/17/21 139 lb (63 kg)      No flowsheet data found. Body mass index is 24.24 kg/m². Based upon direct observation of the patient, evaluation of cognition reveals recent and remote memory intact. Patient's complete Health Risk Assessment and screening values have been reviewed and are found in Flowsheets. The following problems were reviewed today and where indicated follow up appointments were made and/or referrals ordered. Positive Risk Factor Screenings with Interventions:      Cognitive:   Words recalled: 3 Words Recalled  Clock Drawing Test (CDT) Score: Normal  Total Score Interpretation: Positive Mini-Cog  Cognitive Impairment Interventions:  · Patient declines any further evaluation/treatment for cognitive impairment        Health Habits/Nutrition:  Health Habits/Nutrition  Do you exercise for at least 20 minutes 2-3 times per week?: (!) No  Have you lost any weight without trying in the past 3 months?: No  Do you eat only one meal per day?: No  Have you seen the dentist within the past year?: Yes     Health Habits/Nutrition Interventions:  · Inadequate physical activity:  patient agrees to increase physical activity as follows: walking on the treadmill. Personalized Preventive Plan   Current Health Maintenance Status  Immunization History   Administered Date(s) Administered    COVID-19, Moderna, PF, 100mcg/0.5mL 01/25/2021, 02/22/2021    Hepatitis A 08/30/2006, 03/08/2007    Influenza 10/09/2013, 10/06/2014    Influenza Vaccine, unspecified formulation 11/07/2010, 11/28/2011, 09/11/2012, 10/09/2013, 10/06/2014    Influenza Virus Vaccine 10/22/2007, 10/20/2008, 10/06/2014, 10/06/2015, 10/06/2015    Influenza, High Dose (Fluzone 65 yrs and older) 09/15/2016, 09/20/2017, 09/04/2018, 09/14/2020    Pneumococcal Conjugate 13-valent ([id number]) 11/06/2015    Pneumococcal Polysaccharide ([id number]) 11/28/2011    Polio IPV (IPOL) 03/08/2006    Tdap (Boostrix, Adacel) 07/10/2006, 02/01/2009, 02/26/2009, 04/01/2019    Typhoid Vaccine 06/14/2008    Yellow Fever (YF-Vax) 08/30/2006    Zoster Live (Zostavax) 09/22/2011    Zoster Recombinant (Shingrix) 03/21/2018, 05/23/2018        Health Maintenance   Topic Date Due    Hepatitis C screen  Never done    Annual Wellness Visit (AWV)  07/10/2021    Flu vaccine (1) 09/01/2021    DTaP/Tdap/Td vaccine (5 - Td or Tdap) 04/01/2029    DEXA (modify frequency per FRAX score)  Completed    Shingles Vaccine  Completed    Pneumococcal 65+ years Vaccine  Completed    COVID-19 Vaccine  Completed    Hepatitis A vaccine  Aged Out    Hepatitis B vaccine  Aged Out    Hib vaccine  Aged Out    Meningococcal (ACWY) vaccine  Aged Out     Recommendations for [employer] Due: see orders and patient instructions/AVS.  .   Recommended screening schedule for the next 5-10 years is provided to the patient in written form: see Patient Instructions/AVS.    There are no diagnoses linked to this encounter. Nicole Mcpherson is a 67 y.o. female being evaluated by a Virtual Visit (phone) encounter to address concerns as mentioned above. A caregiver was present when appropriate. Due to this being a TeleHealth encounter ([facility]), evaluation of the following organ systems was limited: Vitals/Constitutional/EENT/Resp/CV/GI//MS/Neuro/Skin/Heme-Lymph-Imm. Pursuant to the emergency declaration under the [address] [place] [address], [address] [place] [address] authority and the [employer] Act, this Virtual Visit was conducted with patient's (and/or legal guardian's) consent, to reduce the patient's risk of exposure to COVID-19 and provide necessary medical care. The patient (and/or legal guardian) has also been advised to contact this office for worsening conditions or problems, and seek emergency medical treatment and/or call 911 if deemed necessary. Patient identification was verified at the start of the visit: Yes    Services were provided through phone to substitute for in-person clinic visit. Patient and provider were located at their individual homes. --AAMIR Ferrer CNP on 7/29/2021 at 10:55 AM    An electronic signature was used to authenticate this note.

## 2021-09-27 ENCOUNTER — VIRTUAL VISIT (OUTPATIENT)
Dept: FAMILY MEDICINE CLINIC | Age: 76
End: 2021-09-27
Payer: COMMERCIAL

## 2021-09-27 DIAGNOSIS — M75.90 BURSITIS AND TENDINITIS OF SHOULDER REGION: Primary | ICD-10-CM

## 2021-09-27 DIAGNOSIS — M75.50 BURSITIS AND TENDINITIS OF SHOULDER REGION: Primary | ICD-10-CM

## 2021-09-27 PROCEDURE — 99213 OFFICE O/P EST LOW 20 MIN: CPT | Performed by: NURSE PRACTITIONER

## 2021-09-27 RX ORDER — PREDNISONE 10 MG/1
10 TABLET ORAL 2 TIMES DAILY
Qty: 10 TABLET | Refills: 0 | Status: SHIPPED | OUTPATIENT
Start: 2021-09-27 | End: 2021-10-02

## 2021-09-27 NOTE — PROGRESS NOTES
2021    TELEHEALTH EVALUATION -- Audio/Visual (During [facility]-00 public health emergency)    HPI:    Sean Gonzalez (:  1945) has requested an audio/video evaluation for the following concern(s):    Left shoulder pain    Joint Symptoms:  Patient complains of a 1 month history of pain in left shoulder. Pain is intermittent, sharp in nature, and is mild in intensity. Radiation: none. Associated symptoms:  none. She denies weakness and paresthesia. Symptoms are exacerbated by lifting arm above head. Precipitating factors: received flu shot on .  Symptoms are worse at no particular time. Prior history of similar symptoms: none. Previous treatment: none. Symptoms show no change over time. Patient received flu shot on  and has had left shoulder pain since that time. Review of Systems    Prior to Visit Medications    Medication Sig Taking?  Authorizing Provider   predniSONE (DELTASONE) 10 MG tablet Take 1 tablet by mouth 2 times daily for 5 days Yes AAMIR Cole - CNP   pantoprazole (PROTONIX) 40 MG tablet Take 1 tablet by mouth every morning (before breakfast) Yes Anastasia Nayak MD   azelastine (ASTELIN) 0.1 % nasal spray USE 1 SPRAY IN EACH NOSTRIL TWICE DAILY AS DIRECTED  Patient taking differently: 1 spray by Nasal route 2 times daily  Yes Gladithong Torres, DO   tiZANidine (ZANAFLEX) 4 MG tablet 1/2 to 1 tid prn muscle spasm Yes Valentina Pierce MD   montelukast (SINGULAIR) 10 MG tablet Take 1 tablet by mouth nightly Yes Gladies Torres, DO   fluticasone (FLOVENT HFA) 110 MCG/ACT inhaler INHALE 2 PUFFS BY MOUTH TWICE DAILY Yes Gladies Torres, DO   clindamycin (CLEOCIN T) 1 % external solution Apply to affected area BID prn flares [id number], pt prefers this one Yes Azar Shah MD   ibuprofen (ADVIL;MOTRIN) 200 MG tablet Take 200 mg by mouth every 6 hours as needed for Pain Yes Historical Provider, MD Pseudoephedrine HCl (SUDAFED 12 HOUR PO) Take 1 capsule by mouth as needed  Yes Historical Provider, MD   albuterol sulfate  (90 Base) MCG/ACT inhaler Inhale 2 puffs into the lungs every 4 hours as needed for Wheezing or Shortness of Breath (or cough) Yes Alva Tong DO   Psyllium (METAMUCIL PO) Take 1 Scoop by mouth daily  Yes Historical Provider, MD   Omega-3 Fatty Acids (FISH OIL) 1000 MG CPDR Take by mouth 2 times daily  Yes Historical Provider, MD   Multiple Vitamins-Minerals (PRESERVISION AREDS 2) CAPS Take 1 capsule by mouth 2 times daily  Yes Historical Provider, MD   Vitamin D (CHOLECALCIFEROL) 1000 UNITS CAPS capsule Take 1,000 Units by mouth daily Yes Historical Provider, MD   Multiple Vitamins-Minerals (CENTRUM SILVER) TABS Take  by mouth daily. Yes Historical Provider, MD   cetirizine (ZYRTEC ALLERGY) 10 MG tablet Take 10 mg by mouth daily. Yes Historical Provider, MD   Calcium Carbonate-Vitamin D (CALTRATE 600+D PO) Take by mouth 2 times daily  Yes Historical Provider, MD   aspirin 81 MG chewable tablet Take 81 mg by mouth daily. Yes Historical Provider, MD   Spacer/Aero-Holding Chambers (E-Z SPACER) CONCEPCION 1 Device by Does not apply route daily as needed  Alva Tong DO       Social History     Tobacco Use    Smoking status: Never Smoker    Smokeless tobacco: Never Used   Vaping Use    Vaping Use: Never used   Substance Use Topics    Alcohol use:  Yes     Alcohol/week: 0.0 standard drinks     Comment: rarely    Drug use: Never            PHYSICAL EXAMINATION:  [ INSTRUCTIONS:  \"[x]\" Indicates a positive item  \"[]\" Indicates a negative item  -- DELETE ALL ITEMS NOT EXAMINED]  Vital Signs: (As obtained by patient/caregiver or practitioner observation)    Blood pressure-  Heart rate-    Respiratory rate-    Temperature-  Pulse oximetry-     Constitutional: [x] Appears well-developed and well-nourished [x] No apparent distress      [] Abnormal-   Mental status  [x] Alert and awake  [x] Oriented to person/place/time [x]Able to follow commands      Eyes:  EOM    [x]  Normal  [] Abnormal-  Sclera  [x]  Normal  [] Abnormal -         Discharge [x]  None visible  [] Abnormal -    HENT:   [x] Normocephalic, atraumatic. [] Abnormal   [x] Mouth/Throat: Mucous membranes are moist.     External Ears [x] Normal  [] Abnormal-     Neck: [x] No visualized mass     Pulmonary/Chest: [x] Respiratory effort normal.  [x] No visualized signs of difficulty breathing or respiratory distress        [] Abnormal-      Musculoskeletal:   [] Normal gait with no signs of ataxia         [] Normal range of motion of neck        [x] Abnormal-  + reported pain when left arm is raised above head      Neurological:        [x] No Facial Asymmetry (Cranial nerve 7 motor function) (limited exam to video visit)          [x] No gaze palsy        [] Abnormal-         Skin:        [x] No significant exanthematous lesions or discoloration noted on facial skin         [] Abnormal-            Psychiatric:       [x] Normal Affect [x] No Hallucinations        [] Abnormal-     Other pertinent observable physical exam findings-     ASSESSMENT/PLAN:  1. Bursitis and tendinitis of shoulder region  Left shoulder pain after flu infection. Pain is mild but persistent for 1 month. She has tried rest and tylenol. She avoids NSAIDS because she is on aspirin. Differentials include tendinitis vs rotator cuff injury. Will treat with short course of steroids. If pain does not improve recommend referral to ortho. Information given today. - predniSONE (DELTASONE) 10 MG tablet; Take 1 tablet by mouth 2 times daily for 5 days  Dispense: 10 tablet; Refill: 0  - Nunu Breuax MD, Orthopedic Surgery, [facility]      No follow-ups on file. Tmeo Moisés, was evaluated through a synchronous (real-time) audio-video encounter. The patient (or guardian if applicable) is aware that this is a billable service.  Verbal consent to proceed has been obtained within the past 12 months. The visit was conducted pursuant to the emergency declaration under the [address] [place] [address] and the [employer] and [employer] Act. Patient identification was verified, and a caregiver was present when appropriate. The patient was located in a state where the provider was credentialed to provide care. Total time spent on this encounter: Not billed by time    --AAMIR Head CNP on 9/27/2021 at 1:03 PM    An electronic signature was used to authenticate this note.

## 2021-10-05 ENCOUNTER — TELEPHONE (OUTPATIENT)
Dept: FAMILY MEDICINE CLINIC | Age: 76
End: 2021-10-05

## 2021-10-05 ENCOUNTER — TELEPHONE (OUTPATIENT)
Dept: FAMILY MEDICINE CLINIC | Age: 76
End: 2021-10-05
Payer: COMMERCIAL

## 2021-10-05 DIAGNOSIS — R39.9 UTI SYMPTOMS: Primary | ICD-10-CM

## 2021-10-05 DIAGNOSIS — R30.0 DYSURIA: Primary | ICD-10-CM

## 2021-10-05 LAB
BILIRUBIN, POC: NORMAL
BLOOD URINE, POC: NORMAL
CLARITY, POC: NORMAL
COLOR, POC: NORMAL
GLUCOSE URINE, POC: NORMAL
KETONES, POC: NORMAL
LEUKOCYTE EST, POC: NORMAL
NITRITE, POC: NORMAL
PH, POC: 5.5
PROTEIN, POC: NORMAL
SPECIFIC GRAVITY, POC: <=1.005
UROBILINOGEN, POC: 0.2

## 2021-10-05 PROCEDURE — 81002 URINALYSIS NONAUTO W/O SCOPE: CPT | Performed by: INTERNAL MEDICINE

## 2021-10-05 RX ORDER — SULFAMETHOXAZOLE AND TRIMETHOPRIM 800; 160 MG/1; MG/1
1 TABLET ORAL 2 TIMES DAILY
Qty: 14 TABLET | Refills: 0 | Status: SHIPPED | OUTPATIENT
Start: 2021-10-05 | End: 2021-10-12

## 2021-10-05 NOTE — TELEPHONE ENCOUNTER
Urine was brought in at lunchtime (brought in while I was at lunch) does she need to schedule appt still?

## 2021-10-05 NOTE — TELEPHONE ENCOUNTER
Poss bladder infection: pain with urination, chills when urinating, no fever, no blood. Symptoms started yesterday PM. Wanting an appointment, no availability.  Please call Gayle back at [phone]

## 2021-10-07 LAB
ORGANISM: ABNORMAL
URINE CULTURE, ROUTINE: ABNORMAL

## 2021-10-25 ENCOUNTER — OFFICE VISIT (OUTPATIENT)
Dept: DERMATOLOGY | Age: 76
End: 2021-10-25
Payer: COMMERCIAL

## 2021-10-25 VITALS — TEMPERATURE: 96.4 F

## 2021-10-25 DIAGNOSIS — L57.0 AK (ACTINIC KERATOSIS): ICD-10-CM

## 2021-10-25 DIAGNOSIS — L30.9 DERMATITIS: ICD-10-CM

## 2021-10-25 DIAGNOSIS — D22.9 MULTIPLE NEVI: Primary | ICD-10-CM

## 2021-10-25 DIAGNOSIS — L82.1 SEBORRHEIC KERATOSIS: ICD-10-CM

## 2021-10-25 DIAGNOSIS — L73.9 FOLLICULITIS: ICD-10-CM

## 2021-10-25 PROCEDURE — 17000 DESTRUCT PREMALG LESION: CPT | Performed by: DERMATOLOGY

## 2021-10-25 PROCEDURE — 99214 OFFICE O/P EST MOD 30 MIN: CPT | Performed by: DERMATOLOGY

## 2021-10-25 PROCEDURE — 17003 DESTRUCT PREMALG LES 2-14: CPT | Performed by: DERMATOLOGY

## 2021-10-25 RX ORDER — TRIAMCINOLONE ACETONIDE 1 MG/G
CREAM TOPICAL
Qty: 80 G | Refills: 1 | Status: SHIPPED | OUTPATIENT
Start: 2021-10-25 | End: 2022-03-25

## 2021-10-25 NOTE — PATIENT INSTRUCTIONS
DRY SKIN CARE    1. Do not take more than 1 shower or bath each day. Try to spend 10 minutes or less in the shower/bath. Avoid hot showers as this can damage the skin and make the dryness worse. 2. Use a moisturizing or mild soap such Dove (for sensitive skin), Cetaphil (Cleansing Bar,Gentle Body Wash, Restoraderm Soothing Wash), CeraVe Hydrating Body Wash, Eucerin Skin Calming Body Wash, or Aveeno Daily Moisturizing Body Wash. Antibacterial soaps can be too drying. 3. Use soap only on limited areas (face, underarms, groin and buttocks). Try to avoid using soap on the arms, legs, trunk and back. 4. After showering, pat dry with a towel and generously apply a thick moisturizer all over. Use a moisturizer every day even if you are not showering that particular day. 5. If you are able, apply the moisturizer a second time during the day as well. 6. If a prescription cream or ointment has been ordered for you, apply the prescription medication to affected areas after your bath/shower while the skin is still damp, then apply the moisturizer to the remainder of the skin. 7. When it comes to moisturizers, the thicker the better. Ointments (such as vaseline) are thicker than creams, and creams are thicker than lotions. Suggested over-the-counter moisturizers include:    · CeraVe Lotion or Cream  · Cetaphil Lotion or Cream  · Eucerin Advanced Repair Cream, Advanced Repair Lotion, Eczema Relief Cream or Intensive Repair Lotion.    · Aquaphor  · Curel Ultra Healing

## 2021-10-25 NOTE — PROGRESS NOTES
Formerly [facility]  Betha Kawasaki, MD  [phone]      [facility]  1945    67 y.o. female     Date of Visit: 10/25/2021    Chief Complaint: moles, f/u AK's  Chief Complaint   Patient presents with    Skin Exam     spot above lt lip stays red back itching      Last seen:   she previously had seen Dr. Francetta Phalen and Dr. Alea Mariano at [facility]    History of Present Illness:    1. Here for evaluation of multiple asx pigmented lesions on the trunk and extremities, present for many years; no change in size/shape/color of any lesions; no bleeding lesions. 2. She has a history of AK's. She has a few new rough lesions on face. Asx. Previous sites - no probs:  *R medial lower FH - AK, diffusely transected at deep margin   *L upper lateral FH - Hypertrophic actinic keratosis, diffusely transected at deep margin      Treated both with efudex for 4 weeks; completed treatment around the end of November 2019. Had a lot of inflammation, crusting and focal bleeding with treatment but healed well. Asymptomatic now but focally rough again on the L upper FH. 3. F/u scalp folliculitis  - intermittent itchy lesions on the scalp (going on for several years) -Theramycin (erythromycin) helped some in the past and she has used topical clinda more recently. No triggers noted. Well controlled overall with use of clinda. 4. 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 Skin: No changing moles or lesions. Past Medical History, Family History, Surgical History, Medications and Allergies reviewed.     Past Medical History:   Diagnosis Date    Actinic keratosis     goes to derm annually    Arthritis     jaws    Cataract mild    Difficult intubation     related to small airway    Diverticulosis     Dry eye     Hiatal hernia     Hyperlipidemia     or Shortness of Breath (or cough) 1 Inhaler 4    Psyllium (METAMUCIL PO) Take 1 Scoop by mouth daily       Spacer/Aero-Holding Chambers (E-Z SPACER) CONCEPCION 1 Device by Does not apply route daily as needed 1 Device 0    Omega-3 Fatty Acids (FISH OIL) 1000 MG CPDR Take by mouth 2 times daily       Multiple Vitamins-Minerals (PRESERVISION AREDS 2) CAPS Take 1 capsule by mouth 2 times daily       Vitamin D (CHOLECALCIFEROL) 1000 UNITS CAPS capsule Take 1,000 Units by mouth daily      Multiple Vitamins-Minerals (CENTRUM SILVER) TABS Take  by mouth daily.  cetirizine (ZYRTEC ALLERGY) 10 MG tablet Take 10 mg by mouth daily.  Calcium Carbonate-Vitamin D (CALTRATE 600+D PO) Take by mouth 2 times daily       aspirin 81 MG chewable tablet Take 81 mg by mouth daily. Allergies   Allergen Reactions    Cyclobenzaprine Other (See Comments)     Patient states it causes her muscles not to work         Physical Examination     Gen, well-appearing    FSE today    trunk and extremities with scattered brown macules and papules   FH, L upper lip, L infraorbital area with erythematous roughly scaled macules  Scalp with few crusted small excoriations  Back with xerosis, minimal erythema                  Assessment and Plan     1. Benign-appearing nevi and few SK's  - educ re ABCD's of MM   educ sun protection SPF 30+  encouraged skin check yearly (sooner if indicated), self checks    2  AK's - FH, L upper lip, L infraorbital  - 3 lesion(s) treated with liquid nitrogen x 2 cycles. ed on risk of blister, hypopigmentation/scar and wound care. 3. Scalp Folliculitis and excoriations - minimal today  - clinda lotion/solution (likes the vehicle better)  - consider oral abx if worsening    4.  *Itchy back - mild dermatitis  - ed DSC, TAC cream bdi prn flares; ed se/misuse

## 2021-11-04 ENCOUNTER — VIRTUAL VISIT (OUTPATIENT)
Dept: FAMILY MEDICINE CLINIC | Age: 76
End: 2021-11-04
Payer: COMMERCIAL

## 2021-11-04 DIAGNOSIS — J30.2 SEASONAL ALLERGIES: ICD-10-CM

## 2021-11-04 DIAGNOSIS — J45.20 MILD INTERMITTENT REACTIVE AIRWAY DISEASE WITHOUT COMPLICATION: ICD-10-CM

## 2021-11-04 DIAGNOSIS — I34.0 MITRAL VALVE INSUFFICIENCY, UNSPECIFIED ETIOLOGY: Primary | ICD-10-CM

## 2021-11-04 DIAGNOSIS — R06.00 DYSPNEA, UNSPECIFIED TYPE: ICD-10-CM

## 2021-11-04 PROCEDURE — 99213 OFFICE O/P EST LOW 20 MIN: CPT | Performed by: FAMILY MEDICINE

## 2021-11-04 RX ORDER — ACETAMINOPHEN 500 MG
500 TABLET ORAL EVERY 4 HOURS PRN
Qty: 360 TABLET | Refills: 1
Start: 2021-11-04

## 2021-11-04 RX ORDER — CLINDAMYCIN PHOSPHATE 11.9 MG/ML
SOLUTION TOPICAL
Qty: 60 ML | Refills: 3 | Status: SHIPPED | OUTPATIENT
Start: 2021-11-04

## 2021-11-04 NOTE — TELEPHONE ENCOUNTER
Fax request received from [facility] pharmacy for clindamycin refill.      Last OV 10/25/2021

## 2021-11-04 NOTE — PROGRESS NOTES
A/P:    Diagnosis Orders   1. Mitral valve insufficiency, unspecified etiology  Echocardiogram complete   2. Dyspnea, unspecified type     3. Mild intermittent reactive airway disease without complication     4. Seasonal allergies       Patient is doing well overall. She does have history of mild mitral valve regurgitation seen on echo in 2015. With her chronic dyspnea in particular, I have ordered a follow-up echo. She agrees to be evaluated with worsening symptoms. She will continue following with gastroenterology. She will continue following with her allergist.    Follow-up in 6 months for an appointment or sooner if needed. O:  Gen- NAD, pleasant  HEENT- Eyes without icterus or injection  Lungs- no increased work of breathing  Psych- Appropriate    S: CC- establish with new doctor  HPI-Gayle is establishing with new doctor over video. She reports that she feels well overall. She notes dyspnea for years that is stable. She reports having extensive cardiac work-up that was negative. She denies chest pain, palpitations, cough, fever, chills, night sweats. She reports that her GI symptoms are controlled--- she sees GI specialist.  She is on pantoprazole. She knows to avoid ibuprofen as much as possible. She uses acetaminophen now as first-line for pain. She reports that her allergies and asthma are reasonably controlled. She has no concerns today. She reports that her asthma and allergy symptoms are reasonably controlled.     ROS- Per HPI    Patient's medications, allergies, past medical hx, family hx, surgical hx, social hx were reviewed and updated

## 2021-12-13 ENCOUNTER — OFFICE VISIT (OUTPATIENT)
Dept: PULMONOLOGY | Age: 76
End: 2021-12-13
Payer: COMMERCIAL

## 2021-12-13 VITALS
OXYGEN SATURATION: 97 % | TEMPERATURE: 96.4 F | BODY MASS INDEX: 24.33 KG/M2 | RESPIRATION RATE: 16 BRPM | HEIGHT: 64 IN | WEIGHT: 142.5 LBS | HEART RATE: 94 BPM | DIASTOLIC BLOOD PRESSURE: 80 MMHG | SYSTOLIC BLOOD PRESSURE: 144 MMHG

## 2021-12-13 DIAGNOSIS — J30.89 SEASONAL ALLERGIC RHINITIS DUE TO OTHER ALLERGIC TRIGGER: ICD-10-CM

## 2021-12-13 DIAGNOSIS — J45.40 REACTIVE AIRWAY DISEASE, MODERATE PERSISTENT, UNCOMPLICATED: Primary | ICD-10-CM

## 2021-12-13 PROCEDURE — 99214 OFFICE O/P EST MOD 30 MIN: CPT | Performed by: INTERNAL MEDICINE

## 2021-12-13 NOTE — PROGRESS NOTES
Chief complaint  This is a 67y.o. year old female  who comes to see me with a chief complaint of   Chief Complaint   Patient presents with    Follow-up     RAD       HPI  Here with cc on follow up on asthma/RADs. No new issues. Continues with flovent bid and albuterol rarely. Only uses albuterol when it is hot and humid. New PCP requested an ECHO which she does not feel she needs. Daughter does not want her to to much activity but she insists on it (I agree with the patient).             Current Outpatient Medications   Medication Sig Dispense Refill    triamcinolone (KENALOG) 0.1 % cream Apply to affected area BID PRN flares 80 g 1    pantoprazole (PROTONIX) 40 MG tablet Take 1 tablet by mouth every morning (before breakfast) 30 tablet 5    azelastine (ASTELIN) 0.1 % nasal spray USE 1 SPRAY IN EACH NOSTRIL TWICE DAILY AS DIRECTED (Patient taking differently: 1 spray by Nasal route 2 times daily ) 1 Bottle 3    tiZANidine (ZANAFLEX) 4 MG tablet 1/2 to 1 tid prn muscle spasm 30 tablet 3    montelukast (SINGULAIR) 10 MG tablet Take 1 tablet by mouth nightly 90 tablet 3    fluticasone (FLOVENT HFA) 110 MCG/ACT inhaler INHALE 2 PUFFS BY MOUTH TWICE DAILY 36 g 3    ibuprofen (ADVIL;MOTRIN) 200 MG tablet Take 200 mg by mouth every 6 hours as needed for Pain      Pseudoephedrine HCl (SUDAFED 12 HOUR PO) Take 1 capsule by mouth as needed       albuterol sulfate  (90 Base) MCG/ACT inhaler Inhale 2 puffs into the lungs every 4 hours as needed for Wheezing or Shortness of Breath (or cough) 1 Inhaler 4    Psyllium (METAMUCIL PO) Take 1 Scoop by mouth daily       Spacer/Aero-Holding Chambers (E-Z SPACER) CONCEPCION 1 Device by Does not apply route daily as needed 1 Device 0    Omega-3 Fatty Acids (FISH OIL) 1000 MG CPDR Take by mouth 2 times daily       Multiple Vitamins-Minerals (PRESERVISION AREDS 2) CAPS Take 1 capsule by mouth 2 times daily       Vitamin D (CHOLECALCIFEROL) 1000 UNITS CAPS capsule Take 1,000 Units by mouth daily      Multiple Vitamins-Minerals (CENTRUM SILVER) TABS Take  by mouth daily.  cetirizine (ZYRTEC ALLERGY) 10 MG tablet Take 10 mg by mouth daily.  Calcium Carbonate-Vitamin D (CALTRATE 600+D PO) Take by mouth 2 times daily       aspirin 81 MG chewable tablet Take 81 mg by mouth daily.  clindamycin (CLEOCIN T) 1 % external solution Apply to affected area BID prn flares NDC:[address]-10, pt prefers this one 60 mL 3    acetaminophen (TYLENOL) 500 MG tablet Take 1 tablet by mouth every 4 hours as needed for Pain 360 tablet 1     No current facility-administered medications for this visit. PHYSICAL EXAM:  Vitals:    12/13/21 1249   BP: (!) 144/80   Pulse: 94   Resp: 16   Temp: 96.4 °F (35.8 °C)   SpO2: 97%       GENERAL:  Well nourished, alert, appears stated age, no distress  HEENT:  No scleral icterus, no conjunctival irritation  NECK:  No thyromegaly, no bruits  LYMPH:  No cervical or supraclavicular adenopathy  HEART:  Regular rate and rhythm, no murmurs  LUNGS:  Clear bilaterally  ABDOMEN:  No distention, no organomegaly  EXTREMITIES:  No edema, no digital clubbing. Varicose veins   NEURO:  No localizing deficits, CN II-XII intac    Pulmonary Function Testing 6/2015  Normal spirometry, lung volumes and diffusing capacity. Bronchial challenge testing showed a mildly positive response for   the presence of bronchial reactivity. Cardiopulmonary Stress Test 11/2018  Moderately reduced exercise achievement at 67 % maximum oxygen   predicted with maximal work load of only 48% and 2.8 METS   achieved. AT ( anaerobic threshold ) achieved at around < 20%   predicted suggestive of muscle deconditioning and or decreased   cardiac out put which is less likely with normal VO 2 pulse. Correlate clinically    Cardiac parameters within normal limits with normal HR response   and oxygen pulse achievement but elevated HRR also suggestive of   some deconditioning.  Normal BP response at VO 2 max. EKG normal   with no ischemic changes or cardiac arrhthymias. Correlate   clinically    Ventilatory parameters within normal limits overall with no   obstructive or restrictive lung disease seen. Higher minute   ventilation likely related to some tachypnea but other wise   normal. Ventilatory efficiency for Co 2 and O 2 were within   normal limits with no obvious dead space ventilation and no Co 2   retention post exercise. ABG post exercise normal with normal A-a   gradient. Correlate clinically    Note: Patient stopped test due to legs could not pedal anymore   and Shortness of breath. Total IgE Level:  60  Significant allergies:  Cow's milk      Assessment/Plan:  1. Reactive airway disease, moderate persistent, uncomplicated  Continue with flovent bid and albuterol PRN. The fact she uses albuterol rarely is the whole point of using flovent scheduled. I told her I don't care if she only uses it once a year. She has no restrictions on her exercise routine     2.  Seasonal allergic rhinitis due to other allergic trigger  Continue with singulair and astelin         Follow up in 6 months

## 2021-12-30 ENCOUNTER — HOSPITAL ENCOUNTER (OUTPATIENT)
Dept: NON INVASIVE DIAGNOSTICS | Age: 76
Discharge: HOME | End: 2021-12-30
Payer: COMMERCIAL

## 2021-12-30 DIAGNOSIS — I34.0 MITRAL VALVE INSUFFICIENCY, UNSPECIFIED ETIOLOGY: ICD-10-CM

## 2021-12-30 LAB
LV EF: 70 %
LVEF MODALITY: NORMAL

## 2021-12-30 PROCEDURE — 93306 TTE W/DOPPLER COMPLETE: CPT

## 2022-02-16 DIAGNOSIS — J30.89 SEASONAL ALLERGIC RHINITIS DUE TO OTHER ALLERGIC TRIGGER: ICD-10-CM

## 2022-02-16 RX ORDER — AZELASTINE 1 MG/ML
2 SPRAY, METERED NASAL 2 TIMES DAILY
Qty: 3 EACH | Refills: 3 | Status: SHIPPED | OUTPATIENT
Start: 2022-02-16

## 2022-03-24 DIAGNOSIS — J45.40 REACTIVE AIRWAY DISEASE, MODERATE PERSISTENT, UNCOMPLICATED: ICD-10-CM

## 2022-03-25 RX ORDER — FLUTICASONE PROPIONATE 110 UG/1
AEROSOL, METERED RESPIRATORY (INHALATION)
Qty: 36 G | Refills: 3 | Status: SHIPPED | OUTPATIENT
Start: 2022-03-25

## 2022-03-25 RX ORDER — TRIAMCINOLONE ACETONIDE 1 MG/G
CREAM TOPICAL
Qty: 80 G | Refills: 1 | Status: SHIPPED | OUTPATIENT
Start: 2022-03-25

## 2022-04-12 ENCOUNTER — OFFICE VISIT (OUTPATIENT)
Dept: FAMILY MEDICINE CLINIC | Age: 77
End: 2022-04-12
Payer: MEDICARE

## 2022-04-12 VITALS
BODY MASS INDEX: 23.56 KG/M2 | DIASTOLIC BLOOD PRESSURE: 70 MMHG | WEIGHT: 138 LBS | HEART RATE: 79 BPM | SYSTOLIC BLOOD PRESSURE: 132 MMHG | OXYGEN SATURATION: 99 % | HEIGHT: 64 IN

## 2022-04-12 DIAGNOSIS — M62.838 MUSCLE SPASM: Primary | ICD-10-CM

## 2022-04-12 DIAGNOSIS — J45.40 REACTIVE AIRWAY DISEASE, MODERATE PERSISTENT, UNCOMPLICATED: ICD-10-CM

## 2022-04-12 LAB
A/G RATIO: 2 (ref 1.1–2.2)
ALBUMIN SERPL-MCNC: 4.3 G/DL (ref 3.4–5)
ALP BLD-CCNC: 106 U/L (ref 40–129)
ALT SERPL-CCNC: 29 U/L (ref 10–40)
ANION GAP SERPL CALCULATED.3IONS-SCNC: 14 MMOL/L (ref 3–16)
AST SERPL-CCNC: 28 U/L (ref 15–37)
BILIRUB SERPL-MCNC: <0.2 MG/DL (ref 0–1)
BUN BLDV-MCNC: 9 MG/DL (ref 7–20)
CALCIUM SERPL-MCNC: 9.2 MG/DL (ref 8.3–10.6)
CHLORIDE BLD-SCNC: 99 MMOL/L (ref 99–110)
CO2: 24 MMOL/L (ref 21–32)
CREAT SERPL-MCNC: <0.5 MG/DL (ref 0.6–1.2)
GFR AFRICAN AMERICAN: >60
GFR NON-AFRICAN AMERICAN: >60
GLUCOSE BLD-MCNC: 102 MG/DL (ref 70–99)
MAGNESIUM: 1.9 MG/DL (ref 1.8–2.4)
POTASSIUM SERPL-SCNC: 3.9 MMOL/L (ref 3.5–5.1)
SODIUM BLD-SCNC: 137 MMOL/L (ref 136–145)
TOTAL PROTEIN: 6.5 G/DL (ref 6.4–8.2)

## 2022-04-12 PROCEDURE — 99213 OFFICE O/P EST LOW 20 MIN: CPT | Performed by: FAMILY MEDICINE

## 2022-04-12 PROCEDURE — G8427 DOCREV CUR MEDS BY ELIG CLIN: HCPCS | Performed by: FAMILY MEDICINE

## 2022-04-12 PROCEDURE — G8399 PT W/DXA RESULTS DOCUMENT: HCPCS | Performed by: FAMILY MEDICINE

## 2022-04-12 PROCEDURE — 1123F ACP DISCUSS/DSCN MKR DOCD: CPT | Performed by: FAMILY MEDICINE

## 2022-04-12 PROCEDURE — G8420 CALC BMI NORM PARAMETERS: HCPCS | Performed by: FAMILY MEDICINE

## 2022-04-12 PROCEDURE — 36415 COLL VENOUS BLD VENIPUNCTURE: CPT | Performed by: FAMILY MEDICINE

## 2022-04-12 PROCEDURE — 1036F TOBACCO NON-USER: CPT | Performed by: FAMILY MEDICINE

## 2022-04-12 PROCEDURE — 1090F PRES/ABSN URINE INCON ASSESS: CPT | Performed by: FAMILY MEDICINE

## 2022-04-12 PROCEDURE — 4040F PNEUMOC VAC/ADMIN/RCVD: CPT | Performed by: FAMILY MEDICINE

## 2022-04-12 RX ORDER — MONTELUKAST SODIUM 10 MG/1
TABLET ORAL
Qty: 90 TABLET | Refills: 3 | Status: SHIPPED | OUTPATIENT
Start: 2022-04-12

## 2022-04-12 NOTE — PROGRESS NOTES
A/P:    Diagnosis Orders   1. Muscle spasm  Magnesium    Comprehensive Metabolic Panel     I do not feel tizanidine is the cause of her spasms. We will check above lab work. I lean more toward dental procedure last week triggering her known jaw dysfunction at baseline. I would place the second Covid booster second, in my differential, as trigger. She is to be evaluated for symptoms recur. O: /70   Pulse 79   Ht 5' 4\" (1.626 m)   Wt 138 lb (62.6 kg)   SpO2 99%   BMI 23.69 kg/m²    Gen- NAD, pleasant  HEENT- Eyes without icterus or injection, she has decreased mouth opening  Neck- Supple, no lymphadenopathy appreciated  Lungs- CTAB  Heart- RRR  Abd- Soft, non tender  Ext- No edema  Psych- Appropriate    S: CC-jaw stiffness  HPI-patient reports starting with painless bilateral jaw spasms about an hour after she took tizanidine yesterday. The spasms were off and on for about an hour or so. She denies cough, dyspnea, wheezing, rash, GI symptoms. She had second COVID vaccine booster last Friday. A couple days before that she had dental work. She has always had trouble opening her mouth. She is up-to-date with her tetanus shot.     ROS- Per HPI    Patient's medications, allergies, and past medical hx were reviewed

## 2022-06-17 ENCOUNTER — HOSPITAL ENCOUNTER (OUTPATIENT)
Dept: MRI IMAGING | Age: 77
Discharge: HOME | End: 2022-06-17
Payer: MEDICARE

## 2022-06-17 DIAGNOSIS — K86.2 PANCREATIC CYST: ICD-10-CM

## 2022-06-17 PROCEDURE — 6360000004 HC RX CONTRAST MEDICATION: Performed by: INTERNAL MEDICINE

## 2022-06-17 PROCEDURE — A9577 INJ MULTIHANCE: HCPCS | Performed by: INTERNAL MEDICINE

## 2022-06-17 PROCEDURE — 74183 MRI ABD W/O CNTR FLWD CNTR: CPT

## 2022-06-17 RX ADMIN — GADOBENATE DIMEGLUMINE 13 ML: 529 INJECTION, SOLUTION INTRAVENOUS at 11:02

## 2022-06-21 ENCOUNTER — TELEPHONE (OUTPATIENT)
Dept: PULMONOLOGY | Age: 77
End: 2022-06-21

## 2022-06-21 NOTE — TELEPHONE ENCOUNTER
Patient arrived two hours late to appointment because she had the original appointment time from  written down ( an appointment that was cancelled due to provider being out ). She really would like to be seen before she goes out of town this week for the next month. She leaves Friday.      She also had an  Albuterol inhaler that she wanted to make sure it was ok by Dr. Afia Kaiser  to use  2021

## 2022-06-21 NOTE — TELEPHONE ENCOUNTER
SW scheduling they are hoping to have a cancellation this week to get her in. Please advise regarding her albuterol inhaler since this is . Last appt: 2021  Next appt: no future appointment at this time.      Message routed to Dr. [facility]

## 2022-06-22 ENCOUNTER — OFFICE VISIT (OUTPATIENT)
Dept: PULMONOLOGY | Age: 77
End: 2022-06-22
Payer: MEDICARE

## 2022-06-22 VITALS
BODY MASS INDEX: 23.35 KG/M2 | OXYGEN SATURATION: 98 % | RESPIRATION RATE: 18 BRPM | HEART RATE: 74 BPM | DIASTOLIC BLOOD PRESSURE: 80 MMHG | HEIGHT: 64 IN | SYSTOLIC BLOOD PRESSURE: 115 MMHG | WEIGHT: 136.8 LBS | TEMPERATURE: 97.3 F

## 2022-06-22 DIAGNOSIS — J45.40 REACTIVE AIRWAY DISEASE, MODERATE PERSISTENT, UNCOMPLICATED: Primary | ICD-10-CM

## 2022-06-22 DIAGNOSIS — J30.89 SEASONAL ALLERGIC RHINITIS DUE TO OTHER ALLERGIC TRIGGER: ICD-10-CM

## 2022-06-22 PROCEDURE — G8420 CALC BMI NORM PARAMETERS: HCPCS | Performed by: INTERNAL MEDICINE

## 2022-06-22 PROCEDURE — 1090F PRES/ABSN URINE INCON ASSESS: CPT | Performed by: INTERNAL MEDICINE

## 2022-06-22 PROCEDURE — 1123F ACP DISCUSS/DSCN MKR DOCD: CPT | Performed by: INTERNAL MEDICINE

## 2022-06-22 PROCEDURE — G8427 DOCREV CUR MEDS BY ELIG CLIN: HCPCS | Performed by: INTERNAL MEDICINE

## 2022-06-22 PROCEDURE — 99214 OFFICE O/P EST MOD 30 MIN: CPT | Performed by: INTERNAL MEDICINE

## 2022-06-22 PROCEDURE — 1036F TOBACCO NON-USER: CPT | Performed by: INTERNAL MEDICINE

## 2022-06-22 PROCEDURE — G8399 PT W/DXA RESULTS DOCUMENT: HCPCS | Performed by: INTERNAL MEDICINE

## 2022-06-22 RX ORDER — ALBUTEROL SULFATE 90 UG/1
2 AEROSOL, METERED RESPIRATORY (INHALATION) EVERY 4 HOURS PRN
Qty: 18 G | Refills: 5 | Status: SHIPPED | OUTPATIENT
Start: 2022-06-22

## 2022-06-22 NOTE — PROGRESS NOTES
Chief complaint  This is a 65y.o. year old female  who comes to see me with a chief complaint of   Chief Complaint   Patient presents with    Follow-up       HPI  Here with cc on follow up on asthma/RADs. She is doing well. About to go on a trip to [place] for one month. Much more humid there. Albuterol script is  and needs new script. Still on flovent bid, singulair and astelin.   No significant changes etc.         Current Outpatient Medications   Medication Sig Dispense Refill    albuterol sulfate HFA (PROAIR HFA) 108 (90 Base) MCG/ACT inhaler Inhale 2 puffs into the lungs every 4 hours as needed for Wheezing or Shortness of Breath 18 g 5    montelukast (SINGULAIR) 10 MG tablet TAKE 1 TABLET BY MOUTH EVERY NIGHT 90 tablet 3    fluticasone (FLOVENT HFA) 110 MCG/ACT inhaler INHALE 2 PUFFS BY MOUTH TWICE DAILY 36 g 3    triamcinolone (KENALOG) 0.1 % cream APPLY TOPICALLY TO THE AFFECTED AREA TWICE DAILY AS NEEDED FOR FLARES 80 g 1    azelastine (ASTELIN) 0.1 % nasal spray 2 sprays by Nasal route 2 times daily 3 each 3    clindamycin (CLEOCIN T) 1 % external solution Apply to affected area BID prn flares NDC:[id number], pt prefers this one 60 mL 3    acetaminophen (TYLENOL) 500 MG tablet Take 1 tablet by mouth every 4 hours as needed for Pain 360 tablet 1    pantoprazole (PROTONIX) 40 MG tablet Take 1 tablet by mouth every morning (before breakfast) 30 tablet 5    tiZANidine (ZANAFLEX) 4 MG tablet 1/2 to 1 tid prn muscle spasm 30 tablet 3    ibuprofen (ADVIL;MOTRIN) 200 MG tablet Take 200 mg by mouth every 6 hours as needed for Pain      Pseudoephedrine HCl (SUDAFED 12 HOUR PO) Take 1 capsule by mouth as needed       albuterol sulfate  (90 Base) MCG/ACT inhaler Inhale 2 puffs into the lungs every 4 hours as needed for Wheezing or Shortness of Breath (or cough) 1 Inhaler 4    Psyllium (METAMUCIL PO) Take 1 Scoop by mouth daily       Spacer/Aero-Holding Chambers (E-Z SPACER) CONCEPCION 1 Device by Does not apply route daily as needed 1 Device 0    Omega-3 Fatty Acids (FISH OIL) 1000 MG CPDR Take by mouth 2 times daily       Multiple Vitamins-Minerals (PRESERVISION AREDS 2) CAPS Take 1 capsule by mouth 2 times daily       Vitamin D (CHOLECALCIFEROL) 1000 UNITS CAPS capsule Take 1,000 Units by mouth daily      Multiple Vitamins-Minerals (CENTRUM SILVER) TABS Take  by mouth daily.  cetirizine (ZYRTEC ALLERGY) 10 MG tablet Take 10 mg by mouth daily.  Calcium Carbonate-Vitamin D (CALTRATE 600+D PO) Take by mouth 2 times daily       aspirin 81 MG chewable tablet Take 81 mg by mouth daily. No current facility-administered medications for this visit. PHYSICAL EXAM:  Vitals:    06/22/22 0809   BP: 115/80   Pulse: 74   Resp: 18   Temp: 97.3 °F (36.3 °C)   SpO2: 98%       GENERAL:  Well nourished, alert, appears stated age, no distress  HEENT:  No scleral icterus, no conjunctival irritation  NECK:  No thyromegaly, no bruits  LYMPH:  No cervical or supraclavicular adenopathy  HEART:  Regular rate and rhythm, no murmurs  LUNGS:  Clear bilaterally  ABDOMEN:  No distention, no organomegaly  EXTREMITIES:  No edema, no digital clubbing. Varicose veins   NEURO:  No localizing deficits, CN II-XII intac    Pulmonary Function Testing 6/2015  Normal spirometry, lung volumes and diffusing capacity. Bronchial challenge testing showed a mildly positive response for   the presence of bronchial reactivity. Cardiopulmonary Stress Test 11/2018  Moderately reduced exercise achievement at 67 % maximum oxygen   predicted with maximal work load of only 48% and 2.8 METS   achieved. AT ( anaerobic threshold ) achieved at around < 20%   predicted suggestive of muscle deconditioning and or decreased   cardiac out put which is less likely with normal VO 2 pulse.    Correlate clinically    Cardiac parameters within normal limits with normal HR response   and oxygen pulse achievement but elevated HRR also suggestive of   some deconditioning. Normal BP response at VO 2 max. EKG normal   with no ischemic changes or cardiac arrhthymias. Correlate   clinically    Ventilatory parameters within normal limits overall with no   obstructive or restrictive lung disease seen. Higher minute   ventilation likely related to some tachypnea but other wise   normal. Ventilatory efficiency for Co 2 and O 2 were within   normal limits with no obvious dead space ventilation and no Co 2   retention post exercise. ABG post exercise normal with normal A-a   gradient. Correlate clinically    Note: Patient stopped test due to legs could not pedal anymore   and Shortness of breath. Total IgE Level:  60  Significant allergies:  Cow's milk      Assessment/Plan:  1. Reactive airway disease, moderate persistent, uncomplicated  Controlled on flovent and albuterol. Refill albuterol.    - albuterol sulfate HFA (PROAIR HFA) 108 (90 Base) MCG/ACT inhaler; Inhale 2 puffs into the lungs every 4 hours as needed for Wheezing or Shortness of Breath  Dispense: 18 g; Refill: 5    2.  Seasonal allergic rhinitis due to other allergic trigger  Astelin and singulair       Follow up in 6 months

## 2022-06-29 ENCOUNTER — TELEPHONE (OUTPATIENT)
Dept: FAMILY MEDICINE CLINIC | Age: 77
End: 2022-06-29

## 2022-06-29 ENCOUNTER — TELEPHONE (OUTPATIENT)
Dept: PULMONOLOGY | Age: 77
End: 2022-06-29

## 2022-06-29 DIAGNOSIS — U07.1 COVID-19: Primary | ICD-10-CM

## 2022-06-29 NOTE — TELEPHONE ENCOUNTER
Unfortunately there is not a Covid medication. There is an antiviral called Paxlovid however that is used for high risk patients and has a lot of interactions with medications so we do not use it often. It is best to use OTC medications for symptoms. Drink plenty of water and rest. If she has shortness of breath she should be evaluated in [place]. Please document call and then close encounter.   thanks

## 2022-06-29 NOTE — TELEPHONE ENCOUNTER
Pt states that she is in [place] and tested positive with covid. She is asking if the covid medication can be call in in. Also  Please advise.      Onset- yesterday    Symptoms  Cough  Runny nose    otc- no    covid exp- not sure  covid test- POSITIVE TODAY    [address] , [address] [phone] [facility] [phone]  [address] [place] [address] [address]  Phone: [phone] Fax: [phone]

## 2022-06-29 NOTE — TELEPHONE ENCOUNTER
Pt is in [facility] on vacation. She tested positive this morning for Covid. She says all night long she was told that she was coughing, but she doesn't remember it. She's using her inhaler and it's helping. She says she has a runny nose, and since using the inhaler, she's stopped coughing. The coughing was bad she says to the point it was scary, she was having a hard time catching her breath. She's very tired. Was very wheezy, having a hard time catching her breath towards the end of the call.  She would like Dr Abby salazar as to whether or not paxlovid is a viable option for her and if it is, if it's possible to have it sent to [place] in [facility], phone number [phone]

## 2022-06-30 NOTE — TELEPHONE ENCOUNTER
I suspect she will do fine but her age puts her at higher risk. Therefore I did send the medicine in to the pharmacy in [place].   As long as symptoms have been going on 5 days or less it is indicated

## 2022-08-12 NOTE — PATIENT INSTRUCTIONS
Camille King MD  [address] [facility] [address]  [address]  [facility] [phone] [address] [facility], [address] [facility] [address] [facility] [address]  [phone] home

## 2022-08-31 ENCOUNTER — TELEPHONE (OUTPATIENT)
Dept: FAMILY MEDICINE CLINIC | Age: 77
End: 2022-08-31

## 2022-08-31 ENCOUNTER — OFFICE VISIT (OUTPATIENT)
Dept: FAMILY MEDICINE CLINIC | Age: 77
End: 2022-08-31
Payer: MEDICARE

## 2022-08-31 VITALS
OXYGEN SATURATION: 99 % | BODY MASS INDEX: 22.94 KG/M2 | WEIGHT: 134.4 LBS | DIASTOLIC BLOOD PRESSURE: 72 MMHG | SYSTOLIC BLOOD PRESSURE: 129 MMHG | HEART RATE: 76 BPM | HEIGHT: 64 IN | RESPIRATION RATE: 16 BRPM

## 2022-08-31 DIAGNOSIS — Z78.0 POST-MENOPAUSAL: Primary | ICD-10-CM

## 2022-08-31 DIAGNOSIS — M85.80 OSTEOPENIA, UNSPECIFIED LOCATION: ICD-10-CM

## 2022-08-31 DIAGNOSIS — Z00.00 MEDICARE ANNUAL WELLNESS VISIT, SUBSEQUENT: Primary | ICD-10-CM

## 2022-08-31 PROCEDURE — G0439 PPPS, SUBSEQ VISIT: HCPCS | Performed by: NURSE PRACTITIONER

## 2022-08-31 PROCEDURE — 1123F ACP DISCUSS/DSCN MKR DOCD: CPT | Performed by: NURSE PRACTITIONER

## 2022-08-31 ASSESSMENT — PATIENT HEALTH QUESTIONNAIRE - PHQ9
SUM OF ALL RESPONSES TO PHQ QUESTIONS 1-9: 0
2. FEELING DOWN, DEPRESSED OR HOPELESS: 0
SUM OF ALL RESPONSES TO PHQ QUESTIONS 1-9: 0
SUM OF ALL RESPONSES TO PHQ QUESTIONS 1-9: 0
1. LITTLE INTEREST OR PLEASURE IN DOING THINGS: 0
SUM OF ALL RESPONSES TO PHQ QUESTIONS 1-9: 0
SUM OF ALL RESPONSES TO PHQ9 QUESTIONS 1 & 2: 0

## 2022-08-31 ASSESSMENT — LIFESTYLE VARIABLES
HOW MANY STANDARD DRINKS CONTAINING ALCOHOL DO YOU HAVE ON A TYPICAL DAY: 1 OR 2
HOW OFTEN DO YOU HAVE A DRINK CONTAINING ALCOHOL: MONTHLY OR LESS

## 2022-08-31 NOTE — PROGRESS NOTES
Medicare Annual Wellness Visit    Ashvin Salvador is here for Medicare AWV    Assessment & Plan   Medicare annual wellness visit, subsequent    Osteopenia, unspecified location  -     DEXA BONE DENSITY 2 SITES; Future      Recommendations for Preventive Services Due: see orders and patient instructions/AVS.  Recommended screening schedule for the next 5-10 years is provided to the patient in written form: see Patient Instructions/AVS.     Return for Medicare Annual Wellness Visit in 1 year. Subjective       Patient's complete Health Risk Assessment and screening values have been reviewed and are found in Flowsheets. The following problems were reviewed today and where indicated follow up appointments were made and/or referrals ordered. Positive Risk Factor Screenings with Interventions:                  No Positive Risk Factors identified today. Objective   Vitals:    08/31/22 1021   BP: 129/72   Site: Left Upper Arm   Position: Sitting   Cuff Size: Medium Adult   Pulse: 76   Resp: 16   SpO2: 99%   Weight: 134 lb 6.4 oz (61 kg)   Height: 5' 4\" (1.626 m)      Body mass index is 23.07 kg/m². Allergies   Allergen Reactions    Cyclobenzaprine Other (See Comments)     Patient states it causes her muscles not to work     Prior to Visit Medications    Medication Sig Taking?  Authorizing Provider   albuterol sulfate HFA (PROAIR HFA) 108 (90 Base) MCG/ACT inhaler Inhale 2 puffs into the lungs every 4 hours as needed for Wheezing or Shortness of Breath Yes Song Graves DO   montelukast (SINGULAIR) 10 MG tablet TAKE 1 TABLET BY MOUTH EVERY NIGHT Yes Song Graves DO   fluticasone (FLOVENT HFA) 110 MCG/ACT inhaler INHALE 2 PUFFS BY MOUTH TWICE DAILY Yes Song Graves DO   triamcinolone (KENALOG) 0.1 % cream APPLY TOPICALLY TO THE AFFECTED AREA TWICE DAILY AS NEEDED FOR FLARES Yes Bri Russ MD   azelastine (ASTELIN) 0.1 % nasal spray 2 sprays by Nasal route 2 times daily Yes Lew Panning, DO   clindamycin (CLEOCIN T) 1 % external solution Apply to affected area BID prn flares [id number], pt prefers this one Yes Tammie Mcgee MD   acetaminophen (TYLENOL) 500 MG tablet Take 1 tablet by mouth every 4 hours as needed for Pain Yes Yesica Ochoa DO   pantoprazole (PROTONIX) 40 MG tablet Take 1 tablet by mouth every morning (before breakfast) Yes Patsy Agee MD   tiZANidine (ZANAFLEX) 4 MG tablet 1/2 to 1 tid prn muscle spasm Yes Katarina Jennings MD   ibuprofen (ADVIL;MOTRIN) 200 MG tablet Take 200 mg by mouth every 6 hours as needed for Pain Yes Historical Provider, MD   Pseudoephedrine HCl (SUDAFED 12 HOUR PO) Take 1 capsule by mouth as needed  Yes Historical Provider, MD   albuterol sulfate  (90 Base) MCG/ACT inhaler Inhale 2 puffs into the lungs every 4 hours as needed for Wheezing or Shortness of Breath (or cough) Yes Lew Panning, DO   Psyllium (METAMUCIL PO) Take 1 Scoop by mouth daily  Yes Historical Provider, MD   Spacer/Aero-Holding Chambers (E-Z SPACER) CONCEPCION 1 Device by Does not apply route daily as needed Yes Lew Panning, DO   Omega-3 Fatty Acids (FISH OIL) 1000 MG CPDR Take by mouth 2 times daily  Yes Historical Provider, MD   Multiple Vitamins-Minerals (PRESERVISION AREDS 2) CAPS Take 1 capsule by mouth 2 times daily  Yes Historical Provider, MD   Vitamin D (CHOLECALCIFEROL) 1000 UNITS CAPS capsule Take 1,000 Units by mouth daily Yes Historical Provider, MD   Multiple Vitamins-Minerals (CENTRUM SILVER) TABS Take  by mouth daily. Yes Historical Provider, MD   cetirizine (ZYRTEC) 10 MG tablet Take 10 mg by mouth daily. Yes Historical Provider, MD   Calcium Carbonate-Vitamin D (CALTRATE 600+D PO) Take by mouth 2 times daily  Yes Historical Provider, MD   aspirin 81 MG chewable tablet Take 81 mg by mouth daily.  Yes Historical Provider, MD       CareTeam (Including outside providers/suppliers regularly involved in providing care):   Patient Care Team:  Angelina Orozco DO as PCP - General (Family Medicine)  Angelina Orozco DO as PCP - [facility] Empaneled Provider  Shmuel Pearson MD as Consulting Physician (Obstetrics & Gynecology)  Sunny Bailey MD as Consulting Physician (Ophthalmology)  Juan Richard MD as Surgeon (Orthopedic Surgery)  Brent Fox MD as Consulting Physician (Gastroenterology)  Vinnie Leija DO as Consulting Physician (Pulmonology)  Saint Iha, MD (Dermatology)     Reviewed and updated this visit:  Allergies  Meds

## 2022-09-29 ENCOUNTER — TELEPHONE (OUTPATIENT)
Dept: FAMILY MEDICINE CLINIC | Age: 77
End: 2022-09-29

## 2022-09-29 NOTE — TELEPHONE ENCOUNTER
She can schedule annual wellness visit which is different from a physical.  It addresses preventative health measures especially. AWV is not mandatory.

## 2022-09-29 NOTE — TELEPHONE ENCOUNTER
----- Message from Ping [address] sent at 9/29/2022 10:30 AM EDT -----  Subject: Message to Provider    QUESTIONS  Information for Provider? Patient called in saying she received an email   from [facility] [employer] saying she is due for her physcial(view and discuss   medications). I see she had her awv in August and she said she was just in   for her medications refills. Is it necessary for her to schedule a   \"physical\"? Please contact patient if she should be seen. She mentioned   she would have to come in eventually for a pre-op appt for cataract   surgery. ---------------------------------------------------------------------------  --------------  [place] HERNANDEZ  [phone]; OK to leave message on voicemail  ---------------------------------------------------------------------------  --------------  SCRIPT ANSWERS  Relationship to Patient?  Self

## 2022-10-11 ENCOUNTER — OFFICE VISIT (OUTPATIENT)
Dept: FAMILY MEDICINE CLINIC | Age: 77
End: 2022-10-11
Payer: MEDICARE

## 2022-10-11 VITALS
HEART RATE: 81 BPM | DIASTOLIC BLOOD PRESSURE: 82 MMHG | HEIGHT: 64 IN | WEIGHT: 136.4 LBS | BODY MASS INDEX: 23.29 KG/M2 | SYSTOLIC BLOOD PRESSURE: 140 MMHG | OXYGEN SATURATION: 97 %

## 2022-10-11 DIAGNOSIS — R45.82 WORRIES: Primary | ICD-10-CM

## 2022-10-11 PROCEDURE — 1090F PRES/ABSN URINE INCON ASSESS: CPT | Performed by: FAMILY MEDICINE

## 2022-10-11 PROCEDURE — G8427 DOCREV CUR MEDS BY ELIG CLIN: HCPCS | Performed by: FAMILY MEDICINE

## 2022-10-11 PROCEDURE — G8399 PT W/DXA RESULTS DOCUMENT: HCPCS | Performed by: FAMILY MEDICINE

## 2022-10-11 PROCEDURE — G8420 CALC BMI NORM PARAMETERS: HCPCS | Performed by: FAMILY MEDICINE

## 2022-10-11 PROCEDURE — 1036F TOBACCO NON-USER: CPT | Performed by: FAMILY MEDICINE

## 2022-10-11 PROCEDURE — G8484 FLU IMMUNIZE NO ADMIN: HCPCS | Performed by: FAMILY MEDICINE

## 2022-10-11 PROCEDURE — 99213 OFFICE O/P EST LOW 20 MIN: CPT | Performed by: FAMILY MEDICINE

## 2022-10-11 PROCEDURE — 1123F ACP DISCUSS/DSCN MKR DOCD: CPT | Performed by: FAMILY MEDICINE

## 2022-10-11 SDOH — ECONOMIC STABILITY: FOOD INSECURITY: WITHIN THE PAST 12 MONTHS, THE FOOD YOU BOUGHT JUST DIDN'T LAST AND YOU DIDN'T HAVE MONEY TO GET MORE.: NEVER TRUE

## 2022-10-11 SDOH — ECONOMIC STABILITY: FOOD INSECURITY: WITHIN THE PAST 12 MONTHS, YOU WORRIED THAT YOUR FOOD WOULD RUN OUT BEFORE YOU GOT MONEY TO BUY MORE.: NEVER TRUE

## 2022-10-11 ASSESSMENT — SOCIAL DETERMINANTS OF HEALTH (SDOH): HOW HARD IS IT FOR YOU TO PAY FOR THE VERY BASICS LIKE FOOD, HOUSING, MEDICAL CARE, AND HEATING?: NOT HARD AT ALL

## 2022-10-11 NOTE — PROGRESS NOTES
A/P:    Diagnosis Orders   1. Worries          I do not have concern for patient's memory at this time. However, I do not feel mild cognitive impairment can be ruled out with this appointment only, she we will consider referral for neuropsychological testing, she declines this for now, she will be going to [facility] or about a 6-week visit with son fairly soon.,she agrees to have her son and daughter-in-law who she will be staying with watch her closely, and if they have any concerns, or if she changes her mind, I can place neurology referral at that time    O: BP (!) 140/82   Pulse 81   Ht 5' 4\" (1.626 m)   Wt 136 lb 6.4 oz (61.9 kg)   SpO2 97%   BMI 23.41 kg/m²    Gen- NAD, pleasant  HEENT- Eyes without icterus or injection  Neck- Supple, no lymphadenopathy appreciated  Lungs- CTAB  Heart- RRR  Abd- Soft, non tender  Ext- No edema  Psych- Appropriate   Neuro- oriented x3, no focal deficits appreciated, 30 out of 30 on Mini-Mental status screen    S: CC-concern for memory loss  HPI-presents today to discuss possible memory loss. Patient does not think she has a memory problem. However, her daughter is fairly adamant that she does. She reports that in the past 6 to 9 months her daughter has been experiencing increased stress. She has become more irritable and less patient. Patient has been able to do all ADLs. She recently sold large piece of property. She pays bills. 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  She was able to navigate the [facility] airport without any issues.     ROS- Per HPI    Patient's medications, allergies, and past medical hx were reviewed

## 2022-10-14 ENCOUNTER — HOSPITAL ENCOUNTER (OUTPATIENT)
Dept: WOMENS IMAGING | Age: 77
Discharge: HOME | End: 2022-10-14
Payer: MEDICARE

## 2022-10-14 DIAGNOSIS — Z78.0 POST-MENOPAUSAL: ICD-10-CM

## 2022-10-14 PROCEDURE — 77080 DXA BONE DENSITY AXIAL: CPT

## 2022-12-02 ENCOUNTER — TELEMEDICINE (OUTPATIENT)
Dept: FAMILY MEDICINE CLINIC | Age: 77
End: 2022-12-02
Payer: MEDICARE

## 2022-12-02 ENCOUNTER — TELEPHONE (OUTPATIENT)
Dept: FAMILY MEDICINE CLINIC | Age: 77
End: 2022-12-02

## 2022-12-02 DIAGNOSIS — J11.1 INFLUENZA-LIKE ILLNESS: Primary | ICD-10-CM

## 2022-12-02 PROCEDURE — G8420 CALC BMI NORM PARAMETERS: HCPCS | Performed by: FAMILY MEDICINE

## 2022-12-02 PROCEDURE — 1090F PRES/ABSN URINE INCON ASSESS: CPT | Performed by: FAMILY MEDICINE

## 2022-12-02 PROCEDURE — 99213 OFFICE O/P EST LOW 20 MIN: CPT | Performed by: FAMILY MEDICINE

## 2022-12-02 PROCEDURE — 1123F ACP DISCUSS/DSCN MKR DOCD: CPT | Performed by: FAMILY MEDICINE

## 2022-12-02 PROCEDURE — G8484 FLU IMMUNIZE NO ADMIN: HCPCS | Performed by: FAMILY MEDICINE

## 2022-12-02 PROCEDURE — 1036F TOBACCO NON-USER: CPT | Performed by: FAMILY MEDICINE

## 2022-12-02 PROCEDURE — G8427 DOCREV CUR MEDS BY ELIG CLIN: HCPCS | Performed by: FAMILY MEDICINE

## 2022-12-02 PROCEDURE — G8399 PT W/DXA RESULTS DOCUMENT: HCPCS | Performed by: FAMILY MEDICINE

## 2022-12-02 RX ORDER — DEXTROMETHORPHAN HYDROBROMIDE AND PROMETHAZINE HYDROCHLORIDE 15; 6.25 MG/5ML; MG/5ML
5 SYRUP ORAL 4 TIMES DAILY PRN
Qty: 100 ML | Refills: 0 | Status: SHIPPED | OUTPATIENT
Start: 2022-12-02 | End: 2022-12-07

## 2022-12-02 RX ORDER — PREDNISONE 1 MG/1
5 TABLET ORAL 2 TIMES DAILY
Qty: 10 TABLET | Refills: 0 | Status: SHIPPED | OUTPATIENT
Start: 2022-12-02 | End: 2022-12-07

## 2022-12-02 NOTE — TELEPHONE ENCOUNTER
Pt called in stating she has a Sore throat, cough, stiff neck, congestion and bad headache. Pt's symptoms started Wednesday. When Pt takes Aleve it takes her headache away. Pt covid tested yesterday and it was negative. Pt is reachable at [phone].

## 2022-12-02 NOTE — PROGRESS NOTES
2022    Paola Miguel (:  1945) is a 65 y.o. female, here for evaluation of the following chief complaint(s):  URI (Cough, neck pain x 4 days. Taking aleve and mucinex/Denies fever)      ASSESSMENT/PLAN:     Diagnosis Orders   1. Influenza-like illness  promethazine-dextromethorphan (PROMETHAZINE-DM) 6.25-15 MG/5ML syrup    declines tamiflu; call INB; mucinex helps; prednisone 5 mg BID and phen DM          No follow-ups on file. An electronic signature was used to authenticate this note. SUBJECTIVE/OBJECTIVE:  (NOTE : prior results listed below reviewed at this visit to assist in medical decision making.)    HPI / ROS    # flu like illness :    Pt called in stating she has a minimal Sore throat, cough, stiff neck, congestion and bad headache. Pt's symptoms started Wednesday. (2 days ago) When Pt takes Aleve it takes her headache away. Pt covid tested yesterday and it was negative. Neck an scalp HA relived w aleve. Wt Readings from Last 3 Encounters:   10/11/22 136 lb 6.4 oz (61.9 kg)   22 134 lb 6.4 oz (61 kg)   22 136 lb 12.8 oz (62.1 kg)       BP Readings from Last 3 Encounters:   10/11/22 (!) 140/82   22 129/72   22 115/80       Speaking easily in complete sentences/paragraphs      TELEHEALTH EVALUATION -- Audio/Visual (During ZMPVV-98 public health emergency)      Paola Miguel (:  1945) is being evaluated by a Virtual Visit (video visit) encounter to address concerns as mentioned above. A caregiver was present when appropriate. Due to this being a TeleHealth encounter (During XPLYJ-16 public health emergency), evaluation of the following organ systems was limited: Vitals/Constitutional/EENT/Resp/CV/GI//MS/Neuro/Skin/Heme-Lymph-Imm.   Pursuant to the emergency declaration under the [address] [place] [address] and the [employer] Act, this Virtual Visit was conducted with patient's (and/or legal guardian's) consent, to reduce the patient's risk of exposure to COVID-19 and provide necessary medical care. The patient (and/or legal guardian) has also been advised to contact this office for worsening conditions or problems, and seek emergency medical treatment and/or call 911 if deemed necessary. Patient identification was verified at the start of the visit: Yes    Total time spent on this encounter: Not billed by time    Services were provided through a video synchronous discussion virtually to substitute for in-person clinic visit. Patient and provider were located at their individual homes. --Jackson Martinez MD on 12/2/2022 at 12:53 PM    An electronic signature was used to authenticate this note.

## 2022-12-06 ENCOUNTER — OFFICE VISIT (OUTPATIENT)
Dept: DERMATOLOGY | Age: 77
End: 2022-12-06
Payer: MEDICARE

## 2022-12-06 DIAGNOSIS — L73.9 FOLLICULITIS: ICD-10-CM

## 2022-12-06 DIAGNOSIS — D22.9 MULTIPLE NEVI: Primary | ICD-10-CM

## 2022-12-06 DIAGNOSIS — L82.1 SEBORRHEIC KERATOSIS: ICD-10-CM

## 2022-12-06 DIAGNOSIS — L30.9 DERMATITIS: ICD-10-CM

## 2022-12-06 DIAGNOSIS — L57.0 AK (ACTINIC KERATOSIS): ICD-10-CM

## 2022-12-06 PROCEDURE — G8399 PT W/DXA RESULTS DOCUMENT: HCPCS | Performed by: DERMATOLOGY

## 2022-12-06 PROCEDURE — 99214 OFFICE O/P EST MOD 30 MIN: CPT | Performed by: DERMATOLOGY

## 2022-12-06 PROCEDURE — 1123F ACP DISCUSS/DSCN MKR DOCD: CPT | Performed by: DERMATOLOGY

## 2022-12-06 PROCEDURE — 17003 DESTRUCT PREMALG LES 2-14: CPT | Performed by: DERMATOLOGY

## 2022-12-06 PROCEDURE — 1090F PRES/ABSN URINE INCON ASSESS: CPT | Performed by: DERMATOLOGY

## 2022-12-06 PROCEDURE — G8420 CALC BMI NORM PARAMETERS: HCPCS | Performed by: DERMATOLOGY

## 2022-12-06 PROCEDURE — G8484 FLU IMMUNIZE NO ADMIN: HCPCS | Performed by: DERMATOLOGY

## 2022-12-06 PROCEDURE — 1036F TOBACCO NON-USER: CPT | Performed by: DERMATOLOGY

## 2022-12-06 PROCEDURE — G8427 DOCREV CUR MEDS BY ELIG CLIN: HCPCS | Performed by: DERMATOLOGY

## 2022-12-06 PROCEDURE — 17000 DESTRUCT PREMALG LESION: CPT | Performed by: DERMATOLOGY

## 2022-12-06 RX ORDER — CLINDAMYCIN PHOSPHATE 11.9 MG/ML
SOLUTION TOPICAL
Qty: 60 ML | Refills: 3 | Status: SHIPPED | OUTPATIENT
Start: 2022-12-06

## 2022-12-06 RX ORDER — TRIAMCINOLONE ACETONIDE 1 MG/G
CREAM TOPICAL
Qty: 80 G | Refills: 1 | Status: SHIPPED | OUTPATIENT
Start: 2022-12-06

## 2022-12-06 NOTE — PROGRESS NOTES
[facility]  Alejandrina Luis MD  [phone]      Sofia Ewing  1945    65 y.o. female     Date of Visit: 12/6/2022    Chief Complaint: moles, f/u AK's  Chief Complaint   Patient presents with    Skin Exam     Recheck Bottom lip from prior LN2     Last seen: 2021  she previously had seen Dr. Toyin Reynolds and Dr. Dhara Pandya at [facility]    History of Present Illness:    1. Here for evaluation of multiple asx pigmented lesions on the trunk and extremities, present for many years; no change in size/shape/color of any lesions; no bleeding lesions. 2. She has a history of AK's. She has a few new rough lesions on face, chest and hand. Asx. Previous sites - no probs:  *R medial lower FH - AK, diffusely transected at deep margin   *L upper lateral FH - Hypertrophic actinic keratosis, diffusely transected at deep margin      Treated both with efudex for 4 weeks; completed treatment around the end of November 2019. Had a lot of inflammation, crusting and focal bleeding with treatment but healed well.    3. F/u scalp folliculitis  - intermittent itchy lesions on the scalp (going on for several years) -Theramycin (erythromycin) helped some in the past and she has used topical clinda more recently. No triggers noted. Well controlled overall with use of clinda. 4. F/u dermatitis - itchy back and chronic dryness - has tried Aveeno body wash, no moisturizer regularly. TAC is helping prn flares. 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 Skin: No changing moles or lesions. Past Medical History, Family History, Surgical History, Medications and Allergies reviewed.     Past Medical History:   Diagnosis Date    Actinic keratosis     goes to derm annually    Arthritis     jaws    Cataract mild    Difficult intubation     related to small airway    Diverticulosis     Dry eye     Hiatal hernia     Hyperlipidemia     Macular degeneration dr Janis Jimenes    Mitral valve prolapse     diagnosed [facility] early 1990s. asymptomatic    Osteopenia     RAD (reactive airway disease)     treated by dr Owen Johnson.     Seasonal allergies     Small bowel obstruction ([address] 04/2021    resolved       Past Surgical History:   Procedure Laterality Date    APPENDECTOMY  1948    COLONOSCOPY      x2    DILATION AND CURETTAGE OF UTERUS      1979    EYE SURGERY      to remove ptyergium    HYSTERECTOMY (CERVIX STATUS UNKNOWN)      complete    COCO AND BSO (CERVIX REMOVED)  1989    secondary to fibroids    TUBAL LIGATION  1979    UPPER GASTROINTESTINAL ENDOSCOPY N/A 6/30/2021    ENDOSCOPIC ULTRASOUND OF PANCREAS performed by Edu Calhoun MD at [place] [address]  6/30/2021    EGD BIOPSY performed by Edu Calhoun MD at [place] [address] Medications Marked as Taking for the 12/6/22 encounter (Office Visit) with Dell Dunn MD   Medication Sig Dispense Refill    promethazine-dextromethorphan (PROMETHAZINE-DM) 6.25-15 MG/5ML syrup Take 5 mLs by mouth 4 times daily as needed for Cough 100 mL 0    predniSONE (DELTASONE) 5 MG tablet Take 1 tablet by mouth 2 times daily for 5 days 10 tablet 0    albuterol sulfate HFA (PROAIR HFA) 108 (90 Base) MCG/ACT inhaler Inhale 2 puffs into the lungs every 4 hours as needed for Wheezing or Shortness of Breath 18 g 5    montelukast (SINGULAIR) 10 MG tablet TAKE 1 TABLET BY MOUTH EVERY NIGHT 90 tablet 3    fluticasone (FLOVENT HFA) 110 MCG/ACT inhaler INHALE 2 PUFFS BY MOUTH TWICE DAILY 36 g 3    triamcinolone (KENALOG) 0.1 % cream APPLY TOPICALLY TO THE AFFECTED AREA TWICE DAILY AS NEEDED FOR FLARES 80 g 1    azelastine (ASTELIN) 0.1 % nasal spray 2 sprays by Nasal route 2 times daily 3 each 3    clindamycin (CLEOCIN T) 1 % external solution Apply to affected area BID prn flares [id number], pt prefers this one 60 mL 3    acetaminophen (TYLENOL) 500 MG tablet Take 1 tablet by mouth every 4 hours as needed for Pain 360 tablet 1    pantoprazole (PROTONIX) 40 MG tablet Take 1 tablet by mouth every morning (before breakfast) 30 tablet 5    tiZANidine (ZANAFLEX) 4 MG tablet 1/2 to 1 tid prn muscle spasm 30 tablet 3    ibuprofen (ADVIL;MOTRIN) 200 MG tablet Take 200 mg by mouth every 6 hours as needed for Pain      Pseudoephedrine HCl (SUDAFED 12 HOUR PO) Take 1 capsule by mouth as needed       Psyllium (METAMUCIL PO) Take 1 Scoop by mouth daily       Spacer/Aero-Holding Chambers (E-Z SPACER) CONCEPCION 1 Device by Does not apply route daily as needed 1 Device 0    Omega-3 Fatty Acids (FISH OIL) 1000 MG CPDR Take by mouth 2 times daily       Multiple Vitamins-Minerals (PRESERVISION AREDS 2) CAPS Take 1 capsule by mouth 2 times daily       Vitamin D (CHOLECALCIFEROL) 1000 UNITS CAPS capsule Take 1,000 Units by mouth daily      Multiple Vitamins-Minerals (CENTRUM SILVER) TABS Take  by mouth daily. cetirizine (ZYRTEC) 10 MG tablet Take 10 mg by mouth daily. Calcium Carbonate-Vitamin D (CALTRATE 600+D PO) Take by mouth 2 times daily       aspirin 81 MG chewable tablet Take 81 mg by mouth daily. Allergies   Allergen Reactions    Cyclobenzaprine Other (See Comments)     Patient states it causes her muscles not to work         Physical Examination     Gen, well-appearing    FSE today    trunk and extremities with scattered brown macules and papules   chest, R hand, L cheek, L lower lip and L FH with erythematous roughly scaled macules  Scalp with few crusted small excoriations  Back with xerosis, minimal erythema                  Assessment and Plan     1. Benign-appearing nevi and few SK's  - educ re ABCD's of MM   educ sun protection SPF 30+  encouraged skin check yearly (sooner if indicated), self checks    2  AK's - chest, R hand, L cheek, L lower lip and L FH   - - 5 lesion(s) treated with liquid nitrogen with cryac or swab.   Treated with 2 cycles for 1-5 seconds each after consent from patient. Patient educated on risk of blister, hypopigmentation/scar and wound care. Tolerated well. 3. Scalp Folliculitis and excoriations - stable/minimal today  - clinda lotion/solution (likes the vehicle better)  - consider oral abx if worsening    4.  *Itchy back - mild dermatitis - stable  - ed DSC, TAC cream bdi prn flares; ed se/misuse

## 2022-12-14 ENCOUNTER — HOSPITAL ENCOUNTER (OUTPATIENT)
Dept: WOMENS IMAGING | Age: 77
Discharge: HOME | End: 2022-12-14
Payer: MEDICARE

## 2022-12-14 DIAGNOSIS — Z12.31 VISIT FOR SCREENING MAMMOGRAM: ICD-10-CM

## 2022-12-14 PROCEDURE — 77067 SCR MAMMO BI INCL CAD: CPT

## 2023-01-16 ENCOUNTER — OFFICE VISIT (OUTPATIENT)
Dept: FAMILY MEDICINE CLINIC | Age: 78
End: 2023-01-16
Payer: MEDICARE

## 2023-01-16 VITALS
BODY MASS INDEX: 23.56 KG/M2 | HEART RATE: 90 BPM | WEIGHT: 138 LBS | SYSTOLIC BLOOD PRESSURE: 150 MMHG | DIASTOLIC BLOOD PRESSURE: 65 MMHG | TEMPERATURE: 97 F | OXYGEN SATURATION: 98 % | HEIGHT: 64 IN

## 2023-01-16 DIAGNOSIS — F43.9 STRESS AT HOME: ICD-10-CM

## 2023-01-16 DIAGNOSIS — R41.9 COGNITIVE COMPLAINTS: Primary | ICD-10-CM

## 2023-01-16 PROCEDURE — 1036F TOBACCO NON-USER: CPT | Performed by: FAMILY MEDICINE

## 2023-01-16 PROCEDURE — G8427 DOCREV CUR MEDS BY ELIG CLIN: HCPCS | Performed by: FAMILY MEDICINE

## 2023-01-16 PROCEDURE — G8484 FLU IMMUNIZE NO ADMIN: HCPCS | Performed by: FAMILY MEDICINE

## 2023-01-16 PROCEDURE — 99213 OFFICE O/P EST LOW 20 MIN: CPT | Performed by: FAMILY MEDICINE

## 2023-01-16 PROCEDURE — G8399 PT W/DXA RESULTS DOCUMENT: HCPCS | Performed by: FAMILY MEDICINE

## 2023-01-16 PROCEDURE — 1090F PRES/ABSN URINE INCON ASSESS: CPT | Performed by: FAMILY MEDICINE

## 2023-01-16 PROCEDURE — G8420 CALC BMI NORM PARAMETERS: HCPCS | Performed by: FAMILY MEDICINE

## 2023-01-16 PROCEDURE — 1123F ACP DISCUSS/DSCN MKR DOCD: CPT | Performed by: FAMILY MEDICINE

## 2023-01-16 ASSESSMENT — MINI MENTAL STATE EXAM
WHAT MONTH IS THIS?: 1
WHAT FLOOR ARE WE ON [IN FACILITY]?/ WHAT ROOM ARE WE IN [IN HOME]?: 1
NOW WHAT WERE THE THREE OBJECTS I ASKED YOU TO REMEMBER?: 2
WHAT DAY OF THE WEEK IS THIS?: 0
PLACE DESIGN, ERASER AND PENCIL IN FRONT OF THE PERSON.  SAY:  COPY THIS DESIGN PLEASE.  SHOW: DESIGN. ALLOW: MULTIPLE TRIES. WAIT UNTIL PERSON IS FINISHED AND HANDS IT BACK. SCORE: ONLY FOR DIAGRAM WITH 4-SIDED FIGURE BETWEEN TWO 5-SIDED FIGURES: 1
SHOW: PENCIL [OBJECT] ASK: WHAT IS THIS CALLED?: 1
HAND THE PERSON A PENCIL AND PAPER. SAY: WRITE ANY COMPLETE SENTENCE ON THAT
PIECE OF PAPER. (NOTE: THE SENTENCE MUST MAKE SENSE. IGNORE SPELLING ERRORS): 1
SAY: PUT THE PAPER DOWN ON THE FLOOR, SCORE IF PAPER IS PLACED BACK ON FLOOR: 1
SAY: I WOULD LIKE YOU TO COUNT BACKWARD FROM 100 BY SEVENS: 5
WHAT IS THE NAME OF THIS BUILDING [IN FACILITY]?/WHAT IS THE STREET ADDRESS OF THIS HOUSE [IN HOME]?: 1
WHAT IS TODAY'S DATE?: 1
SHOW: WRISTWATCH [OBJECT] ASK: WHAT IS THIS CALLED?: 1
ASK THE PERSON IF HE IS RIGHT OR LEFT-HANDED. TAKE A PIECE OF PAPER AND HOLD IT UP IN
FRONT OF THE PERSON. SAY: TAKE THIS PAPER IN YOUR RIGHT/LEFT HAND (WHICHEVER IS NON-
DOMINANT), SCORE IF PAPER IS PICKED UP IN CORRECT HAND.: 1
WHAT YEAR IS THIS?: 1
WHICH SEASON IS THIS?: 1
WHAT STATE [OR PROVINCE] ARE WE IN?: 1
SAY: I AM GOING TO NAME THREE OBJECTS. WHEN I AM FINISHED, I WANT YOU TO REPEAT
THEM. REMEMBER WHAT THEY ARE BECAUSE I AM GOING TO ASK YOU TO NAME THEM AGAIN IN
A FEW MINUTES.  SAY THE FOLLOWING WORDS SLOWLY AT 1-SECOND INTERVALS - BALL/ CAR/ MAN [ITERATIONS FOR REPEAT ADMINISTRATION]: 3
SUM ALL MMSE QUESTIONS FOR TOTAL SCORE [OUT OF 30].: 28
WHAT CITY/TOWN ARE WE IN?: 1
SAY: I WOULD LIKE YOU TO REPEAT THIS PHRASE AFTER ME: NO IFS, ANDS, OR BUTS.: 1
SAY: READ THE WORDS ON THE PAGE AND THEN DO WHAT IT SAYS. THEN HAND THE PERSON
THE SHEET WITH CLOSE YOUR EYES ON IT. IF THE SUBJECT READS AND DOES NOT CLOSE THEIR EYES, REPEAT UP TO THREE TIMES. SCORE ONLY IF SUBJECT CLOSES EYES.: 1
WHAT COUNTRY ARE WE IN?: 1
SAY: FOLD THE PAPER IN HALF ONCE WITH BOTH HANDS, SCORE IF PAPER IS CORRECTLY FOLDED IN HALF.: 1

## 2023-01-16 NOTE — PROGRESS NOTES
A/P:    Diagnosis Orders   1. Cognitive complaints  REJI - Hilario Perry MD, Neurology, [facility]    Ambulatory referral to Psychology      2. Stress at home  Ambulatory referral to Psychology        I have placed referral to neurology so she can have a neuropsychological exam    With her stress, social home stressors, I have placed referral to office counselor, she agrees to be evaluated with worsening symptoms    Follow-up in 4 weeks or sooner if needed    O: BP (!) 150/65   Pulse 90   Temp 97 °F (36.1 °C)   Ht 5' 4\" (1.626 m)   Wt 138 lb (62.6 kg)   SpO2 98%   BMI 23.69 kg/m²    Gen- NAD   HEENT- Eyes without icterus or injection  Neck- Supple, no lymphadenopathy appreciated  Lungs- CTAB  Heart- RRR  Ext- No edema  Psych-tearful, no SI/HI    S: CC-memory  HPI-Gayle reports that daughter continues to voice concerns about her memory. Patient feels like her memory is just fine. She reports that her son in [place] did not have concerns. She has reached the point where she does want to have a memory evaluation. She reports turbulent relationship with daughter. Daughter blames her for not getting , not having children.     ROS- Per HPI    Patient's medications, allergies, and past medical hx were reviewed

## 2023-01-30 ENCOUNTER — OFFICE VISIT (OUTPATIENT)
Dept: PULMONOLOGY | Age: 78
End: 2023-01-30
Payer: MEDICARE

## 2023-01-30 VITALS
RESPIRATION RATE: 21 BRPM | HEART RATE: 65 BPM | DIASTOLIC BLOOD PRESSURE: 70 MMHG | BODY MASS INDEX: 23.7 KG/M2 | OXYGEN SATURATION: 98 % | WEIGHT: 138.8 LBS | HEIGHT: 64 IN | TEMPERATURE: 97.4 F | SYSTOLIC BLOOD PRESSURE: 125 MMHG

## 2023-01-30 DIAGNOSIS — J30.89 SEASONAL ALLERGIC RHINITIS DUE TO OTHER ALLERGIC TRIGGER: ICD-10-CM

## 2023-01-30 DIAGNOSIS — J45.40 REACTIVE AIRWAY DISEASE, MODERATE PERSISTENT, UNCOMPLICATED: Primary | ICD-10-CM

## 2023-01-30 PROCEDURE — 1036F TOBACCO NON-USER: CPT | Performed by: INTERNAL MEDICINE

## 2023-01-30 PROCEDURE — G8484 FLU IMMUNIZE NO ADMIN: HCPCS | Performed by: INTERNAL MEDICINE

## 2023-01-30 PROCEDURE — G8399 PT W/DXA RESULTS DOCUMENT: HCPCS | Performed by: INTERNAL MEDICINE

## 2023-01-30 PROCEDURE — G8420 CALC BMI NORM PARAMETERS: HCPCS | Performed by: INTERNAL MEDICINE

## 2023-01-30 PROCEDURE — G8427 DOCREV CUR MEDS BY ELIG CLIN: HCPCS | Performed by: INTERNAL MEDICINE

## 2023-01-30 PROCEDURE — 1123F ACP DISCUSS/DSCN MKR DOCD: CPT | Performed by: INTERNAL MEDICINE

## 2023-01-30 PROCEDURE — 99214 OFFICE O/P EST MOD 30 MIN: CPT | Performed by: INTERNAL MEDICINE

## 2023-01-30 PROCEDURE — 1090F PRES/ABSN URINE INCON ASSESS: CPT | Performed by: INTERNAL MEDICINE

## 2023-01-30 NOTE — PROGRESS NOTES
Behavioral Health Consultation  malathi Vera, [address]  2/1/2023   3:29 PM EST      Time spent with Patient: 40 minutes  This is patient's first [facility] appointment. Reason for Consult:    Chief Complaint   Patient presents with    Other     relationship problems     Referring Provider: Adan Altamirano DO  [address]    Patient provided informed consent for the behavioral health program. Discussed with patient model of service to include the limits of confidentiality (i.e. abuse reporting, suicide intervention, etc.) and short-term intervention focused approach. Pt indicated understanding. Feedback given to PCP. S:  Patient presents with concerns about communication issues with her daughter. Pt states that it began in  May 2021 and reports stress in the relationship with her daughter since that time. Pt reports her daughter is having some financial difficulties and life concerns, and externalizes some of this onto Pt. Pt reports feeling upset as a result. Pt's daughter also has some medical issues that are concerning. Pt states that her daughter has concern about Pt's memory, and this has been upsetting for Pt. Pt feels that she is able to complete necessary tasks, finances, responsibilities at home. Pt has seen a counselor in the past. Pt states that he friends are very supportive. Goals for treatment include learning ways to communicate more effectively with her daughter. Patient lives with her daughter. Patient is retired. Patient describes friends as social support. Per note from Dr. Kristi Gale,  on 1/16/23:  HPI-Gayle reports that daughter continues to voice concerns about her memory. Patient feels like her memory is just fine. She reports that her son in [place] did not have concerns. She has reached the point where she does want to have a memory evaluation. She reports turbulent relationship with daughter.   Daughter blames her for not getting , not having children.     O:  MSE:    Appearance: good hygiene   Attitude: cooperative and friendly  Consciousness: alert  Orientation: oriented to person, place, time, general circumstance  Memory    recent and remote memory intact   Attention/Concentration    intact  Psychomotor Activity:normal  Eye Contact: normal  Speech: normal rate and volume, well-articulated  Affect Congruent  Perception: within normal limits  Thought Content: within normal limits  Thought Process: logical, coherent and goal-directed  Associations    logical connections  Insight: good  Judgment    Intact  Appetite normal  Sleep disturbance No  Fatigue No  Loss of pleasure No  Impulsive behavior No  Morbid Ideation: no   Suicide Assessment: no suicidal ideation, plan, or intent  Homicidal Ideation: no      History:    Medications:   Current Outpatient Medications   Medication Sig Dispense Refill    clindamycin (CLEOCIN T) 1 % external solution Apply to affected area BID prn flares NDC:[address]-36, pt prefers this one 60 mL 3    triamcinolone (KENALOG) 0.1 % cream APPLY TOPICALLY TO THE AFFECTED AREA TWICE DAILY AS NEEDED FOR FLARES 80 g 1    albuterol sulfate HFA (PROAIR HFA) 108 (90 Base) MCG/ACT inhaler Inhale 2 puffs into the lungs every 4 hours as needed for Wheezing or Shortness of Breath 18 g 5    montelukast (SINGULAIR) 10 MG tablet TAKE 1 TABLET BY MOUTH EVERY NIGHT 90 tablet 3    fluticasone (FLOVENT HFA) 110 MCG/ACT inhaler INHALE 2 PUFFS BY MOUTH TWICE DAILY 36 g 3    azelastine (ASTELIN) 0.1 % nasal spray 2 sprays by Nasal route 2 times daily 3 each 3    acetaminophen (TYLENOL) 500 MG tablet Take 1 tablet by mouth every 4 hours as needed for Pain 360 tablet 1    pantoprazole (PROTONIX) 40 MG tablet Take 1 tablet by mouth every morning (before breakfast) 30 tablet 5    tiZANidine (ZANAFLEX) 4 MG tablet 1/2 to 1 tid prn muscle spasm 30 tablet 3    ibuprofen (ADVIL;MOTRIN) 200 MG tablet Take 200 mg by mouth every 6 hours as needed for Pain      Pseudoephedrine HCl (SUDAFED 12 HOUR PO) Take 1 capsule by mouth as needed       Psyllium (METAMUCIL PO) Take 1 Scoop by mouth daily       Spacer/Aero-Holding Chambers (E-Z SPACER) CONCEPCION 1 Device by Does not apply route daily as needed 1 Device 0    Omega-3 Fatty Acids (FISH OIL) 1000 MG CPDR Take by mouth 2 times daily       Multiple Vitamins-Minerals (PRESERVISION AREDS 2) CAPS Take 1 capsule by mouth 2 times daily       Vitamin D (CHOLECALCIFEROL) 1000 UNITS CAPS capsule Take 1,000 Units by mouth daily      Multiple Vitamins-Minerals (CENTRUM SILVER) TABS Take  by mouth daily. cetirizine (ZYRTEC) 10 MG tablet Take 10 mg by mouth daily. Calcium Carbonate-Vitamin D (CALTRATE 600+D PO) Take by mouth 2 times daily       aspirin 81 MG chewable tablet Take 81 mg by mouth daily. No current facility-administered medications for this visit. Social History:   Social History     Socioeconomic History    Marital status:      Spouse name: Not on file    Number of children: Not on file    Years of education: Not on file    Highest education level: Not on file   Occupational History    Occupation: Retired   Tobacco Use    Smoking status: Never    Smokeless tobacco: Never    Tobacco comments:     Never   Vaping Use    Vaping Use: Never used   Substance and Sexual Activity    Alcohol use:  Yes     Alcohol/week: 1.0 standard drink     Types: 1 Glasses of wine per week     Comment: Average 1- 2 glass wine/month    Drug use: Never    Sexual activity: Never     Comment:  12/6/2010   Other Topics Concern    Not on file   Social History Narrative     for 10.5 years as of 2021, 3 children, was L/D RN, likes to read/liked to sew     Social Determinants of Health     Financial Resource Strain: Low Risk     Difficulty of Paying Living Expenses: Not hard at all   Food Insecurity: No Food Insecurity    Worried About [address] in the Last Year: Never true    [address] in the Last Year: Never true   Transportation Needs: Not on file   Physical Activity: Insufficiently Active    Days of Exercise per Week: 2 days    Minutes of Exercise per Session: 10 min   Stress: Not on file   Social Connections: Not on file   Intimate Partner Violence: Not on file   Housing Stability: Not on file     TOBACCO:   reports that she has never smoked. She has never used smokeless tobacco.  ETOH:   reports current alcohol use of about 1.0 standard drink per week. Family History:   Family History   Problem Relation Age of Onset    Cancer Mother 66        lung (was a smoker)    Heart Disease Mother     Diabetes Father     Heart Disease Father     High Blood Pressure Sister     High Blood Pressure Sister     Dementia Sister         PARTIALLY    Diabetes Maternal Aunt     Diabetes Paternal Uncle          A:  Patient endorses stable mood. Denies SI/HI, with good insight and motivation. PHQ Scores 2/1/2023 1/16/2023 8/31/2022 7/29/2021 4/29/2021 7/9/2020 4/1/2019   PHQ2 Score 0 0 0 0 0 0 0   PHQ9 Score 0 0 0 0 0 0 0     Interpretation of Total Score Depression Severity: 1-4 = Minimal depression, 5-9 = Mild depression, 10-14 = Moderate depression, 15-19 = Moderately severe depression, 20-27 = Severe depression     DELMA 7 SCORE 2/1/2023   DELMA-7 Total Score 0     Interpretation of DELMA-7 score: 5-9 = mild anxiety, 10-14 = moderate anxiety, 15+ = severe anxiety. Recommend referral to behavioral health for scores 10 or greater. Diagnosis:    1. Relationship problems          Past Diagnosis:        Diagnosis Date    Actinic keratosis     goes to derm annually    Arthritis     jaws    Cataract mild    Difficult intubation     related to small airway    Diverticulosis     Dry eye     Hiatal hernia     Hyperlipidemia     Macular degeneration     dr Mandy Michel    Mitral valve prolapse     diagnosed [facility] early 1990s. asymptomatic    Osteopenia     RAD (reactive airway disease)     treated by dr Myra Hussein. Seasonal allergies     Small bowel obstruction ([facility] [address] 04/2021    resolved         Plan:  Pt interventions:  Provided handout on  grief  Trained in improving communication skills  Established rapport  Conducted functional assessment  Supportive techniques  [place]-setting to identify pt's primary goals for [facility] visit / overall health  Collaborative treatment planning,Clarified role of [facility] in primary care,Recommended that pt establish with a mental health clinician with whom they can meet regularly for psychotherapy services    Pt Behavioral Change Plan:   See Pt Instructions

## 2023-01-30 NOTE — PROGRESS NOTES
Chief complaint  This is a 65y.o. year old female  who comes to see me with a chief complaint of   Chief Complaint   Patient presents with    Follow-up       HPI  Here with cc on follow up on asthma/RADs. Had COVID last summer and needed to use albuterol because she couldn't get all the air out. Had the flu in fall as well. Back to using her flovent bid and rarely using albuterol. On astelin and singulair. Going for testing her memory as she is having some forgetfulness that has her daughter worried.   Overall is doing well regardless         Current Outpatient Medications   Medication Sig Dispense Refill    clindamycin (CLEOCIN T) 1 % external solution Apply to affected area BID prn flares NDC:[address]-18, pt prefers this one 60 mL 3    triamcinolone (KENALOG) 0.1 % cream APPLY TOPICALLY TO THE AFFECTED AREA TWICE DAILY AS NEEDED FOR FLARES 80 g 1    albuterol sulfate HFA (PROAIR HFA) 108 (90 Base) MCG/ACT inhaler Inhale 2 puffs into the lungs every 4 hours as needed for Wheezing or Shortness of Breath 18 g 5    montelukast (SINGULAIR) 10 MG tablet TAKE 1 TABLET BY MOUTH EVERY NIGHT 90 tablet 3    fluticasone (FLOVENT HFA) 110 MCG/ACT inhaler INHALE 2 PUFFS BY MOUTH TWICE DAILY 36 g 3    azelastine (ASTELIN) 0.1 % nasal spray 2 sprays by Nasal route 2 times daily 3 each 3    acetaminophen (TYLENOL) 500 MG tablet Take 1 tablet by mouth every 4 hours as needed for Pain 360 tablet 1    pantoprazole (PROTONIX) 40 MG tablet Take 1 tablet by mouth every morning (before breakfast) 30 tablet 5    tiZANidine (ZANAFLEX) 4 MG tablet 1/2 to 1 tid prn muscle spasm 30 tablet 3    ibuprofen (ADVIL;MOTRIN) 200 MG tablet Take 200 mg by mouth every 6 hours as needed for Pain      Pseudoephedrine HCl (SUDAFED 12 HOUR PO) Take 1 capsule by mouth as needed       Psyllium (METAMUCIL PO) Take 1 Scoop by mouth daily       Spacer/Aero-Holding Chambers (E-Z SPACER) CONCEPCION 1 Device by Does not apply route daily as needed 1 Device 0 Omega-3 Fatty Acids (FISH OIL) 1000 MG CPDR Take by mouth 2 times daily       Multiple Vitamins-Minerals (PRESERVISION AREDS 2) CAPS Take 1 capsule by mouth 2 times daily       Vitamin D (CHOLECALCIFEROL) 1000 UNITS CAPS capsule Take 1,000 Units by mouth daily      Multiple Vitamins-Minerals (CENTRUM SILVER) TABS Take  by mouth daily. cetirizine (ZYRTEC) 10 MG tablet Take 10 mg by mouth daily. Calcium Carbonate-Vitamin D (CALTRATE 600+D PO) Take by mouth 2 times daily       aspirin 81 MG chewable tablet Take 81 mg by mouth daily. No current facility-administered medications for this visit. PHYSICAL EXAM:  Vitals:    01/30/23 0926   BP: 125/70   Pulse: 65   Resp: 21   Temp: 97.4 °F (36.3 °C)   SpO2: 98%       GENERAL:  Well nourished, alert, appears stated age, no distress  HEENT:  No scleral icterus, no conjunctival irritation  NECK:  No thyromegaly, no bruits  LYMPH:  No cervical or supraclavicular adenopathy  HEART:  Regular rate and rhythm, no murmurs  LUNGS:  Clear bilaterally  ABDOMEN:  No distention, no organomegaly  EXTREMITIES:  No edema, no digital clubbing. Varicose veins   NEURO:  No localizing deficits, CN II-XII intac    Pulmonary Function Testing 6/2015  Normal spirometry, lung volumes and diffusing capacity. Bronchial challenge testing showed a mildly positive response for   the presence of bronchial reactivity. Cardiopulmonary Stress Test 11/2018  Moderately reduced exercise achievement at 67 % maximum oxygen   predicted with maximal work load of only 48% and 2.8 METS   achieved. AT ( anaerobic threshold ) achieved at around < 20%   predicted suggestive of muscle deconditioning and or decreased   cardiac out put which is less likely with normal VO 2 pulse. Correlate clinically    Cardiac parameters within normal limits with normal HR response   and oxygen pulse achievement but elevated HRR also suggestive of   some deconditioning. Normal BP response at VO 2 max.  EKG normal   with no ischemic changes or cardiac arrhthymias. Correlate   clinically    Ventilatory parameters within normal limits overall with no   obstructive or restrictive lung disease seen. Higher minute   ventilation likely related to some tachypnea but other wise   normal. Ventilatory efficiency for Co 2 and O 2 were within   normal limits with no obvious dead space ventilation and no Co 2   retention post exercise. ABG post exercise normal with normal A-a   gradient. Correlate clinically    Note: Patient stopped test due to legs could not pedal anymore   and Shortness of breath. Total IgE Level:  60  Significant allergies:  Cow's milk      Assessment/Plan:  1. Reactive airway disease, moderate persistent, uncomplicated  Vs exercise induced asthma. Controlled with flovent and prn albuterol    2.  Seasonal allergic rhinitis due to other allergic trigger  Controlled with astelin and singulair     Stay active     Follow up in 6 months

## 2023-02-01 ENCOUNTER — OFFICE VISIT (OUTPATIENT)
Dept: PSYCHOLOGY | Age: 78
End: 2023-02-01

## 2023-02-01 DIAGNOSIS — Z63.9 RELATIONSHIP PROBLEMS: Primary | ICD-10-CM

## 2023-02-01 SDOH — SOCIAL STABILITY - SOCIAL INSECURITY: PROBLEM RELATED TO PRIMARY SUPPORT GROUP, UNSPECIFIED: Z63.9

## 2023-02-01 ASSESSMENT — ANXIETY QUESTIONNAIRES
6. BECOMING EASILY ANNOYED OR IRRITABLE: 0
3. WORRYING TOO MUCH ABOUT DIFFERENT THINGS: 0
1. FEELING NERVOUS, ANXIOUS, OR ON EDGE: 0
GAD7 TOTAL SCORE: 0
7. FEELING AFRAID AS IF SOMETHING AWFUL MIGHT HAPPEN: 0
5. BEING SO RESTLESS THAT IT IS HARD TO SIT STILL: 0
4. TROUBLE RELAXING: 0
2. NOT BEING ABLE TO STOP OR CONTROL WORRYING: 0

## 2023-02-01 ASSESSMENT — PATIENT HEALTH QUESTIONNAIRE - PHQ9
SUM OF ALL RESPONSES TO PHQ QUESTIONS 1-9: 0
5. POOR APPETITE OR OVEREATING: 0
7. TROUBLE CONCENTRATING ON THINGS, SUCH AS READING THE NEWSPAPER OR WATCHING TELEVISION: 0
6. FEELING BAD ABOUT YOURSELF - OR THAT YOU ARE A FAILURE OR HAVE LET YOURSELF OR YOUR FAMILY DOWN: 0
2. FEELING DOWN, DEPRESSED OR HOPELESS: 0
SUM OF ALL RESPONSES TO PHQ QUESTIONS 1-9: 0
4. FEELING TIRED OR HAVING LITTLE ENERGY: 0
SUM OF ALL RESPONSES TO PHQ QUESTIONS 1-9: 0
SUM OF ALL RESPONSES TO PHQ QUESTIONS 1-9: 0
8. MOVING OR SPEAKING SO SLOWLY THAT OTHER PEOPLE COULD HAVE NOTICED. OR THE OPPOSITE, BEING SO FIGETY OR RESTLESS THAT YOU HAVE BEEN MOVING AROUND A LOT MORE THAN USUAL: 0
3. TROUBLE FALLING OR STAYING ASLEEP: 0
SUM OF ALL RESPONSES TO PHQ9 QUESTIONS 1 & 2: 0
1. LITTLE INTEREST OR PLEASURE IN DOING THINGS: 0
9. THOUGHTS THAT YOU WOULD BE BETTER OFF DEAD, OR OF HURTING YOURSELF: 0

## 2023-02-01 NOTE — PATIENT INSTRUCTIONS
Validate your daughter's experiences and emotions. Review handout on grief. Bereavement, Grief & Mourning        Bereavement is the state of having lost a significant other to death. Grief is the personal response to the loss. Mourning is the public expression of that loss. What is Normal Grief? Grief reactions vary depending on who we are, who we lost, our relationship with that person, the circumstances around their passing, and how much their loss affects our day-to-day functioning. Different people may express grief differently and you may even have different grief responses between one loss and another. Reactions to grief and loss include not just emotional symptoms, but also behavioral and physical symptoms. These reactions can often change over time. All are normal for a short period of time. Emotional Behavioral Physical   Shock, denial,                   numbness Crying unexpectedly Exhaustion/Fatigue      Sadness, anxiety, guilt,       fear Sleep changes (increase or decrease) Decreased energy        Anger (at others or        God), Not eating/Weight changes Memory problems        Irritability, frustration Withdrawing from others Stomach and intestinal upset    Restlessness, difficulty concentrating, trouble making decisions Pain and headaches     Symptoms that are not normal and may signal the need to talk to a professional include:  Use of drugs, alcohol, violence, and thoughts of killing oneself. The duration of grief varies from person to person. Current research shows that the average recovery time is 18 to 24 months. Also, grief reactions can be stronger around anniversary or other significant dates, such as the anniversary of the persons death, birthdays, and holidays. The Stages of Grief  Grief therapists often describe stages of grief outlined by the research of Dr. Marycarmen Diamond. These stages do not always go in order.   You may move back and forth among some of the stages and may even “skip” some of them.  These stages are meant as a guide to help you understand your reactions and those of others who are grieving.  Denial:  Denial (not acknowledging the loss) can help contain the shock of loss.   Denial can act as a “safety mechanism” to block out grief until we are ready to handle it.  Sadness and depression:  Deep, intense grief and mourning appear during this stage.  When the full understanding of our loss comes, it can seem overwhelming.  During this stage, you may cry often and unexpectedly. You may not want to be around people or to do things that you normally enjoy.  During this stage, it is best to remain as active as possible and to seek supportive people who will allow you to say what you need to or to cry when you need to.  It is important to allow yourself to work through your full range and experience of emotions.  Anger:  Rage and anger can be intense toward the person who , toward friends and relatives, and even toward God. It is important to have an outlet to release anger through activities such as exercise, hobbies, or through therapy. Guilt, shame, and blame are feelings that need to be addressed, especially if it is toward you.  Acceptance:  This stage includes “coming to terms” with the loss.  It does not mean that you have found the answers to your questions or that you stop thinking about the person who is gone.  It does signify a reinvestment in life and a willingness to readjust to your new circumstances while carrying the memory of your loved one with you.      How to Help Yourself  Give yourself time to grieve.  It is normal and important to express your grief and to work through the concerns that arise for you at this time.  “Stuffing” your feelings may not be helpful and may delay or prolong your grief.  Find supportive people to reach out to during your grief.  This is the time when the support of others may be the most  helpful. Dont be afraid to tell them how they can best help, even if it means just listening. It is often very helpful to talk about your loss with people who will allow you to express your emotions. Take care of your health. Often after a loss, we stop doing the things we need to for health care, such as exercising, eating correctly, keeping Dr. appointments, or taking prescribed medications. If you are on a health care regimen, it is important to continue to adhere to your treatment. Postpone major life changes. Give yourself time to adjust to your loss before making plans to change jobs, move or sell your home, remarry, etc.  Grief can sometimes cloud your judgment and ability to make decisions. Consider keeping a journal.  It is often helpful to write or tell the story of your loss and what it means to you as a way to work through your feelings. Participate in activities. Staying active through exercise, enjoyable activities, outings with supportive others, or even starting new hobbies can help us get through tough times while providing opportunities for constructive development and use of energy. Find a way to memorialize your loved one. Planting a tree or garden in the name of your loved one, dedicating a work to their memory, contributing to a meek in their name, and other such activities can be helpful. Consider joining grief-support groups or contacting a grief counselor for additional support and help. Remember that depressive symptoms (feeling sad) are a fundamental part of normal bereavement. Staying active and finding support from others can help you to work through the grief process. References  KELLY Pickard, Sathish House T, ORVILLE Lomas, JAMAAL Boyle, & TEJ Damon. (2005). Does widowhood affect memory performance of older persons? Psychological Medicine, 35(2), 217-226. Francisco Spencer, HERNESTO Shah, & BRANDON Goldberg. (2005).   Health behaviors associated with better quality of life for older bereaved persons. Journal of Palliative Medicine, 8(1), . Hermelinda Alvarez.  (2004). Working with grieving adults. Advances in Psychiatric Treatment, 10, 164-170. Payam Zavala JT, Carmen, [address] [facility], & Lola Kurtz. (2004). Life after death: Greif therapy after the suddent traumatic death of a family member. Perspectives in Psychiatric Care, 40(4), 453-396. Darshan, [facility], , MATT Retana, & CHRISTINE Jaimes  (2000). Bereavement services in acute care settings. Death Studies, 24, 51-64. Lorenda Kayser, [address], [place]  (2000). Psychotherapy of traumatic grief:  A review of evidence for psychotherapeutic treatment. Death Studies, 24, 479-495. DARION Troncoso. (2004). Connections between counseling theories and current theories of grief and mourning. Journal of Mental Health Counseling, 26(2), 125-145.

## 2023-02-13 ENCOUNTER — HOSPITAL ENCOUNTER (OUTPATIENT)
Age: 78
Discharge: HOME | End: 2023-02-13
Payer: MEDICARE

## 2023-02-13 ENCOUNTER — OFFICE VISIT (OUTPATIENT)
Dept: FAMILY MEDICINE CLINIC | Age: 78
End: 2023-02-13

## 2023-02-13 ENCOUNTER — HOSPITAL ENCOUNTER (OUTPATIENT)
Dept: GENERAL RADIOLOGY | Age: 78
Discharge: HOME | End: 2023-02-13
Payer: MEDICARE

## 2023-02-13 VITALS
SYSTOLIC BLOOD PRESSURE: 138 MMHG | HEIGHT: 64 IN | DIASTOLIC BLOOD PRESSURE: 74 MMHG | HEART RATE: 77 BPM | BODY MASS INDEX: 23.22 KG/M2 | OXYGEN SATURATION: 98 % | WEIGHT: 136 LBS

## 2023-02-13 DIAGNOSIS — Z51.81 MEDICATION MONITORING ENCOUNTER: ICD-10-CM

## 2023-02-13 DIAGNOSIS — K29.70 GASTRITIS WITHOUT BLEEDING, UNSPECIFIED CHRONICITY, UNSPECIFIED GASTRITIS TYPE: ICD-10-CM

## 2023-02-13 DIAGNOSIS — K44.9 HIATAL HERNIA: ICD-10-CM

## 2023-02-13 DIAGNOSIS — M54.2 NECK PAIN: Primary | ICD-10-CM

## 2023-02-13 DIAGNOSIS — Z83.3 FAMILY HISTORY OF DIABETES MELLITUS: ICD-10-CM

## 2023-02-13 DIAGNOSIS — J45.909 REACTIVE AIRWAY DISEASE WITHOUT COMPLICATION, UNSPECIFIED ASTHMA SEVERITY, UNSPECIFIED WHETHER PERSISTENT: ICD-10-CM

## 2023-02-13 DIAGNOSIS — M54.2 NECK PAIN: ICD-10-CM

## 2023-02-13 LAB
A/G RATIO: 1.7 (ref 1.1–2.2)
ALBUMIN SERPL-MCNC: 4.5 G/DL (ref 3.4–5)
ALP BLD-CCNC: 108 U/L (ref 40–129)
ALT SERPL-CCNC: 16 U/L (ref 10–40)
ANION GAP SERPL CALCULATED.3IONS-SCNC: 12 MMOL/L (ref 3–16)
AST SERPL-CCNC: 21 U/L (ref 15–37)
BASOPHILS ABSOLUTE: 0 K/UL (ref 0–0.2)
BASOPHILS RELATIVE PERCENT: 0.9 %
BILIRUB SERPL-MCNC: 0.4 MG/DL (ref 0–1)
BUN BLDV-MCNC: 9 MG/DL (ref 7–20)
CALCIUM SERPL-MCNC: 9.9 MG/DL (ref 8.3–10.6)
CHLORIDE BLD-SCNC: 103 MMOL/L (ref 99–110)
CO2: 27 MMOL/L (ref 21–32)
CREAT SERPL-MCNC: <0.5 MG/DL (ref 0.6–1.2)
EOSINOPHILS ABSOLUTE: 0.1 K/UL (ref 0–0.6)
EOSINOPHILS RELATIVE PERCENT: 2.5 %
ESTIMATED AVERAGE GLUCOSE: 116.9 MG/DL
GFR SERPL CREATININE-BSD FRML MDRD: >60 ML/MIN/{1.73_M2}
GLUCOSE BLD-MCNC: 106 MG/DL (ref 70–99)
HBA1C MFR BLD: 5.7 %
HCT VFR BLD CALC: 40.8 % (ref 36–48)
HEMOGLOBIN: 13.3 G/DL (ref 12–16)
LYMPHOCYTES ABSOLUTE: 0.8 K/UL (ref 1–5.1)
LYMPHOCYTES RELATIVE PERCENT: 24.5 %
MCH RBC QN AUTO: 28.4 PG (ref 26–34)
MCHC RBC AUTO-ENTMCNC: 32.7 G/DL (ref 31–36)
MCV RBC AUTO: 86.8 FL (ref 80–100)
MONOCYTES ABSOLUTE: 0.4 K/UL (ref 0–1.3)
MONOCYTES RELATIVE PERCENT: 10.8 %
NEUTROPHILS ABSOLUTE: 2.1 K/UL (ref 1.7–7.7)
NEUTROPHILS RELATIVE PERCENT: 61.3 %
PDW BLD-RTO: 14.2 % (ref 12.4–15.4)
PLATELET # BLD: 240 K/UL (ref 135–450)
PMV BLD AUTO: 8.1 FL (ref 5–10.5)
POTASSIUM SERPL-SCNC: 4 MMOL/L (ref 3.5–5.1)
RBC # BLD: 4.7 M/UL (ref 4–5.2)
SODIUM BLD-SCNC: 142 MMOL/L (ref 136–145)
TOTAL PROTEIN: 7.1 G/DL (ref 6.4–8.2)
VITAMIN B-12: 814 PG/ML (ref 211–911)
WBC # BLD: 3.4 K/UL (ref 4–11)

## 2023-02-13 PROCEDURE — 72040 X-RAY EXAM NECK SPINE 2-3 VW: CPT

## 2023-02-13 SDOH — ECONOMIC STABILITY: FOOD INSECURITY: WITHIN THE PAST 12 MONTHS, YOU WORRIED THAT YOUR FOOD WOULD RUN OUT BEFORE YOU GOT MONEY TO BUY MORE.: NEVER TRUE

## 2023-02-13 SDOH — ECONOMIC STABILITY: HOUSING INSECURITY
IN THE LAST 12 MONTHS, WAS THERE A TIME WHEN YOU DID NOT HAVE A STEADY PLACE TO SLEEP OR SLEPT IN A SHELTER (INCLUDING NOW)?: NO

## 2023-02-13 SDOH — ECONOMIC STABILITY: FOOD INSECURITY: WITHIN THE PAST 12 MONTHS, THE FOOD YOU BOUGHT JUST DIDN'T LAST AND YOU DIDN'T HAVE MONEY TO GET MORE.: NEVER TRUE

## 2023-02-13 SDOH — ECONOMIC STABILITY: INCOME INSECURITY: HOW HARD IS IT FOR YOU TO PAY FOR THE VERY BASICS LIKE FOOD, HOUSING, MEDICAL CARE, AND HEATING?: NOT HARD AT ALL

## 2023-02-13 NOTE — PROGRESS NOTES
A/P:    Diagnosis Orders   1. Neck pain  XR CERVICAL SPINE (2-3 VIEWS)    [address]      2. Reactive airway disease without complication, unspecified asthma severity, unspecified whether persistent        3. Hiatal hernia        4. Gastritis without bleeding, unspecified chronicity, unspecified gastritis type        5. Family history of diabetes mellitus  Hemoglobin A1C      6. Medication monitoring encounter  Comprehensive Metabolic Panel    Vitamin B12    CBC with Auto Differential        For her neck pain x-ray ordered, if x-ray results reassuring, will start physical therapy    Her reactive airway disease is controlled, she we will continue albuterol rescue inhaler    Her GI symptoms are controlled, she will continue pantoprazole, with her pantoprazole use, check B12 level, she is to continue following with Dr. Carmen Leon    She would like to get a glucose level and hemoglobin A1c level due to her family history of diabetes    Follow-up in 4 months or sooner if needed    O: /74   Pulse 77   Ht 5' 4\" (1.626 m)   Wt 136 lb (61.7 kg)   SpO2 98%   BMI 23.34 kg/m²    Gen- NAD, pleasant  Musculoskeletal-she has paraspinal hypertonicity from scapula up to occipital areas, she has decreased range of motion in cervical rotation worse to the right  HEENT- Eyes without icterus or injection  Neck- Supple, no lymphadenopathy appreciated  Lungs- CTAB  Heart- RRR  Abd- Soft, non tender  Ext- No edema  Psych- Appropriate, no si/hi  Neuro- Oriented x 3 , demonstrates capacity for decision making    S: CC-check up  HPI-patient presents for checkup. She has started counseling and will be seeing neurology for her family stress, daughters concern for her memory. She reports that her asthma is controlled. She sees Dr. Liliana Dolan. She reports that her GI symptoms are controlled on pantoprazole. She sees Dr. Carmen Leon.   She reports that she injured her neck at the end of November. There has been pain and crepitus off and on. She has difficulty with cervical rotation to right especially.       ROS- Per HPI    Patient's medications, allergies, and past medical hx were reviewed

## 2023-02-16 ENCOUNTER — APPOINTMENT (OUTPATIENT)
Dept: PHYSICAL THERAPY | Age: 78
End: 2023-02-16
Payer: MEDICARE

## 2023-02-16 NOTE — PROGRESS NOTES
Behavioral Health Consultation- Follow UP  Deneise Mortimer, [address]  2/22/2023   11:26 AM      Time spent with Patient: 30 minutes  This is patient's second  [facility] appointment. Reason for Consult:    Chief Complaint   Patient presents with    Follow-up       Referring Provider: Tyler Gupta DO  [address]    Patient provided informed consent for the behavioral health program. Discussed with patient model of service to include the limits of confidentiality (i.e. abuse reporting, suicide intervention, etc.) and short-term intervention focused approach. Pt indicated understanding. Feedback given to PCP. S:  Last appointment 2/1/23    Pt reports that she is doing well, but continues to have concerns about her daughter's well-being. Pt has encouraged her daughter to seek therapy, but she declined. Pt continues to work on her own communication to find ways to support her, and states this has been helpful. Pt processed around her relationship with her daughter. Pt states that since her last visit with Dr. Shawnee Lmabert she has not been forgetting things. Pt continues to have a strong support system with her friends. Pt states that her long-term plans are to move to a halfway facility. Pt states that she and her daughter have been working on her home, and she feels that her daughter is over-extending herself. Pt spoke about her 's death and her grieving process.      O:  MSE:    Appearance: good hygiene   Attitude: cooperative and friendly  Consciousness: alert  Orientation: oriented to person, place, time, general circumstance  Memory    recent and remote memory intact   Attention/Concentration    intact  Psychomotor Activity:normal  Eye Contact: normal  Speech: normal rate and volume, well-articulated  Mood    euthymic  Affect    normal affect  Perception: within normal limits  Thought Content: within normal limits  Thought Process: logical, coherent and goal-directed  Associations    logical connections  Insight: good  Judgment    Intact  Appetite normal  Sleep disturbance No  Fatigue No  Loss of pleasure No  Impulsive behavior No  Morbid Ideation: no   Suicide Assessment: no suicidal ideation, plan, or intent  Homicidal Ideation: no    History:    Medications:   Current Outpatient Medications   Medication Sig Dispense Refill    clindamycin (CLEOCIN T) 1 % external solution Apply to affected area BID prn flares ND:[id number], pt prefers this one 60 mL 3    triamcinolone (KENALOG) 0.1 % cream APPLY TOPICALLY TO THE AFFECTED AREA TWICE DAILY AS NEEDED FOR FLARES 80 g 1    albuterol sulfate HFA (PROAIR HFA) 108 (90 Base) MCG/ACT inhaler Inhale 2 puffs into the lungs every 4 hours as needed for Wheezing or Shortness of Breath 18 g 5    montelukast (SINGULAIR) 10 MG tablet TAKE 1 TABLET BY MOUTH EVERY NIGHT 90 tablet 3    fluticasone (FLOVENT HFA) 110 MCG/ACT inhaler INHALE 2 PUFFS BY MOUTH TWICE DAILY 36 g 3    azelastine (ASTELIN) 0.1 % nasal spray 2 sprays by Nasal route 2 times daily 3 each 3    acetaminophen (TYLENOL) 500 MG tablet Take 1 tablet by mouth every 4 hours as needed for Pain 360 tablet 1    pantoprazole (PROTONIX) 40 MG tablet Take 1 tablet by mouth every morning (before breakfast) 30 tablet 5    tiZANidine (ZANAFLEX) 4 MG tablet 1/2 to 1 tid prn muscle spasm 30 tablet 3    ibuprofen (ADVIL;MOTRIN) 200 MG tablet Take 200 mg by mouth every 6 hours as needed for Pain      Pseudoephedrine HCl (SUDAFED 12 HOUR PO) Take 1 capsule by mouth as needed       Psyllium (METAMUCIL PO) Take 1 Scoop by mouth daily       Spacer/Aero-Holding Chambers (E-Z SPACER) CONCEPCION 1 Device by Does not apply route daily as needed 1 Device 0    Omega-3 Fatty Acids (FISH OIL) 1000 MG CPDR Take by mouth 2 times daily       Multiple Vitamins-Minerals (PRESERVISION AREDS 2) CAPS Take 1 capsule by mouth 2 times daily       Vitamin D (CHOLECALCIFEROL) 1000 UNITS CAPS capsule Take 1,000 Units by mouth daily      Multiple Vitamins-Minerals (CENTRUM SILVER) TABS Take  by mouth daily. cetirizine (ZYRTEC) 10 MG tablet Take 10 mg by mouth daily. Calcium Carbonate-Vitamin D (CALTRATE 600+D PO) Take by mouth 2 times daily       aspirin 81 MG chewable tablet Take 81 mg by mouth daily. No current facility-administered medications for this visit. Social History:   Social History     Socioeconomic History    Marital status:      Spouse name: Not on file    Number of children: Not on file    Years of education: Not on file    Highest education level: Not on file   Occupational History    Occupation: Retired   Tobacco Use    Smoking status: Never    Smokeless tobacco: Never    Tobacco comments:     Never   Vaping Use    Vaping Use: Never used   Substance and Sexual Activity    Alcohol use: Yes     Alcohol/week: 1.0 standard drink     Types: 1 Glasses of wine per week     Comment: Average 1- 2 glass wine/month    Drug use: Never    Sexual activity: Never     Comment:  12/6/2010   Other Topics Concern    Not on file   Social History Narrative     for 10.5 years as of 2021, 3 children, was L/D RN, likes to read/liked to sew     Social Determinants of Health     Financial Resource Strain: Low Risk     Difficulty of Paying Living Expenses: Not hard at all   Food Insecurity: No Food Insecurity    Worried About [address] in the Last Year: Never true    [address]  in the Last Year: Never true   Transportation Needs: Unknown    Lack of Transportation (Medical): Not on file    Lack of Transportation (Non-Medical):  No   Physical Activity: Insufficiently Active    Days of Exercise per Week: 2 days    Minutes of Exercise per Session: 10 min   Stress: Not on file   Social Connections: Not on file   Intimate Partner Violence: Not on file   Housing Stability: Unknown    Unable to Pay for Housing in the Last Year: Not on file    Number of Places Lived in the Last Year: Not on file    Unstable Housing in the Last Year: No     TOBACCO:   reports that she has never smoked. She has never used smokeless tobacco.  ETOH:   reports current alcohol use of about 1.0 standard drink per week. Family History:   Family History   Problem Relation Age of Onset    Cancer Mother 66        lung (was a smoker)    Heart Disease Mother     Diabetes Father     Heart Disease Father     High Blood Pressure Sister     High Blood Pressure Sister     Dementia Sister         PARTIALLY    Diabetes Maternal Aunt     Diabetes Paternal Uncle          A:  Patient endorses stable mood. Denies SI/HI, with good insight and motivation. PHQ Scores 2/22/2023 2/1/2023 1/16/2023 8/31/2022 7/29/2021 4/29/2021 7/9/2020   PHQ2 Score 0 0 0 0 0 0 0   PHQ9 Score 0 0 0 0 0 0 0     Interpretation of Total Score Depression Severity: 1-4 = Minimal depression, 5-9 = Mild depression, 10-14 = Moderate depression, 15-19 = Moderately severe depression, 20-27 = Severe depression     DELMA 7 SCORE 2/22/2023 2/1/2023   DELMA-7 Total Score 0 0     Interpretation of DELMA-7 score: 5-9 = mild anxiety, 10-14 = moderate anxiety, 15+ = severe anxiety. Recommend referral to behavioral health for scores 10 or greater. Diagnosis:    1. Relationship problems          Past Diagnosis:        Diagnosis Date    Actinic keratosis     goes to derm annually    Arthritis     jaws    Cataract mild    Difficult intubation     related to small airway    Diverticulosis     Dry eye     Herpes simplex virus (HSV) infection 10/2/2012    Hiatal hernia     Hyperlipidemia     Macular degeneration     dr Elvira Lovett    Mitral valve prolapse     diagnosed [facility] early 1990s. asymptomatic    Osteopenia     RAD (reactive airway disease)     treated by dr Thrasher Res.     Seasonal allergies     Small bowel obstruction ([facility] [address] 04/2021    resolved         Plan:  Patient interventions:  Discussed boundary setting in relationships  Trained in improving communication skills  Supportive techniques  Used open-ended questions and reflection to increase awareness    Patient Behavioral Change Plan:   See Patient Instructions

## 2023-02-21 NOTE — PLAN OF CARE
[facility] - Outpatient Rehabilitation & Therapy  [address]  Phone: [phone]   Fax:     [phone]      Physical Therapy Certification    Dear Floyd Rodriguez, *,    We had the pleasure of evaluating the following patient for physical therapy services at [facility]. A summary of our findings can be found in the initial assessment below. This includes our plan of care. If you have any questions or concerns regarding these findings, please do not hesitate to contact me at the office phone number checked above. Thank you for the referral.       Physician Signature:_______________________________Date:__________________  By signing above (or electronic signature), therapists plan is approved by physician      Patient: Juliana Mai \"Gayle\"  : 1945   MRN: [id number]  Referring Physician: Floyd Rodriguez, *      Evaluation Date: 2023      Medical Diagnosis Information:  Medical Diagnosis: Neck pain [M54.2]   Treatment Diagnosis: Decreased mobility and strength. Insurance information: PT Insurance Information: [employer]    Precautions/ Contra-indications: None   Latex Allergy:  [x]NO      []YES  Preferred Language for Healthcare:   [x]English       []other:    C-SSRS Triggered by Intake questionnaire (Past 2 wk assessment ):   [x] No, Questionnaire did not trigger screening.   [] Yes, Patient intake triggered C-SSRS Screening      [] C-SSRS Screening completed  [] PCP notified via Epic     SUBJECTIVE: Patient report neck pain that began at the end of 2022. SUSY: stood up and fell and hit her chin, which extended her neck. She states she fell on a soft play mat. She denies symptoms of a concussion. No LOC after the fall. Pt denies dizziness. Pt denies changes in vision. Pt denies headaches.  She reports cracking/popping noises in her neck. Pain is located primarily on the L side. Pt reports her pain is a lot better since November. Pt denies history of falls. Xray Findings:   FINDINGS:   There is minimal, grade 1 anterolisthesis of C3 over C4. There is moderate   disc space narrowing at C4/5 and C5/6. No acute fractures. Relevant Medical History:Additional Pertinent Hx: OA, HPL, Asthma  Functional Outcomes Measure: NDI  = 10    Pain Scale: 2/10  Easing factors: rest  Provocative factors: turning her head, looking up, looking down (reading, jigsaw puzzle)    Type: []Constant   [x]Intermittent  []Radiating []Localized []other:     Numbness/Tingling: Pt denies NT. Occupation/School: Retired - L&D Nurse    Living Status/Prior Level of Function: Independent with ADLs and IADLs,     OBJECTIVE:   Posture: forward head    Functional Mobility/Transfers: independent     Palpation: Gr II TTP to L cervical paraspinals and suboccipitals.      Bandages/Dressings/Incisions: NA    Gait: WNL     CERV ROM     Cervical Flexion 50% discomfort   Cervical Extension 25%     Left Right   Cervical SB 50% 50%   Cervical rotation 25% 25%              UE ROM Left Right   Shoulder Flex WNL WNL   Shoulder Abd WNL WNL   Shoulder ER [facility]   Shoulder IR [facility]   UE Strength  Left Right   Shoulder Flex 4/5 4/5   Shoulder Scap 4/5 4/5   Shoulder ABd (C5 Axillary) 4/5 4/5   Shoulder ER  4/5 4/5   Shoulder IR 4/5 4/5   Elbow Flex (C5 Musc) 4/5 4/5   Elbow Ext (C7 Radial) 4/5 4/5      Reflexes Normal Abnormal Comments               C5-6 Biceps [] []    C6 Brachioradialis [x] []    C7-8 Triceps [x] []      Reflexes/Sensation:    [x]Dermatomes/Myotomes intact    [x]Reflexes equal and normal bilaterally   []Other:    Joint mobility: Cervical Spine   []Normal    [x]Hypo   []Hyper    Orthopedic Special Tests:      Cluster Testing  Normal Abnormal N/A Comments   Babinski Test [] [] []    Sommers Test [] [] []    Inverted Sup Sign [] [] [] Alar Ligament Test [] [] []    Transverse Ligament Test [] [] []    Sharp-Taye Test [x] [] []    Hautards Test [] [] []    Vertebral Artery Test [] [] []             Neural dynamic/ Tension testing Normal Abnormal N/A Comments   Spurling Maneuver:  [x] [] []    Distraction testing: [x] [] []    ULNT [x] [] []    Shoulder Abd testing  [] [] []    Cerv Rot/Lat Flex- 1st Rib [] [] []    Deep Neck Flex/endurance testing [] [] []    Craniocerv Flex testing Duana Maik [] [] []    End Range Tolerance testing. [] [] []     [] [] []                           [x] Patient history, allergies, meds reviewed. Medical chart reviewed. See intake form. Review Of Systems (ROS):  [x]Performed Review of systems (Integumentary, CardioPulmonary, Neurological) by intake and observation. Intake form has been scanned into medical record. Patient has been instructed to contact their primary care physician regarding ROS issues if not already being addressed at this time.       Co-morbidities/Complexities (which will affect course of rehabilitation):   []None           Arthritic conditions   []Rheumatoid arthritis (M05.9)  [x]Osteoarthritis (M19.91)   Cardiovascular conditions   []Hypertension (I10)  [x]Hyperlipidemia (E78.5)  []Angina pectoris (I20)  []Atherosclerosis (I70)  []CVA Musculoskeletal conditions   []Disc pathology   []Congenital spine pathologies   []Prior surgical intervention  []Osteoporosis (M81.8)  []Osteopenia (M85.8)   Endocrine conditions   []Hypothyroid (E03.9)  []Hyperthyroid Gastrointestinal conditions   []Constipation (G57.66)   Metabolic conditions   []Morbid obesity (E66.01)  []Diabetes type 1(E10.65) or 2 (E11.65)   []Neuropathy (G60.9)     Pulmonary conditions   []Asthma (J45)  []Coughing   []COPD (J44.9)   Psychological Disorders  []Anxiety (F41.9)  []Depression (F32.9)   []Other:   []Other:          Barriers to/and or personal factors that will affect rehab potential:              [x]Age  []Sex   []Smoker []Motivation/Lack of Motivation                        [x]Co-Morbidities              []Cognitive Function, education/learning barriers              []Environmental, home barriers              []profession/work barriers  []past PT/medical experience  []other:  Justification:     Falls Risk Assessment (30 days):   [x] Falls Risk assessed and no intervention required. [] Falls Risk assessed and Patient requires intervention due to being higher risk   TUG score (>12s at risk):     [] Falls education provided, including     ASSESSMENT: Pt is a 64 yo female who presents to PT with cervical pain. Signs and symptoms consistent with a mobility deficits secondary to OA. Upon IE, pt demonstrates decreased ROM, pain, decreased strength and functional mobility deficits. Pt would benefit from PT 2x/week for 4-6 weeks in order to address the impairments listed.       Functional Impairments:     [x]Noted cervical/thoracic/GHJ joint hypomobility   []Noted cervical/thoracic/GHJ joint hypermobility   [x]Decreased cervical/UE functional ROM   []Noted Headache pain aggravated by neck movements with/without dizziness   []Abnormal reflexes/sensation/myotomal/dermatomal deficits   [x]Decreased DCF control or ability to hold head up   [x]Decreased RC/scapular/core strength and neuromuscular control    [x]Decreased UE functional strength   []other:      Functional Activity Limitations (from functional questionnaire and intake)   [x]Reduced ability to tolerate prolonged functional positions   []Reduced ability or difficulty with changes of positions or transfers between positions   [x]Reduced ability to maintain good posture and demonstrate good body mechanics with sitting, bending, and lifting   [x] Reduced ability or tolerance with driving and/or computer work   [x]Reduced ability to perform lifting, reaching, carrying tasks   []Reduced ability to concentrate   [x]Reduced ability to sleep    []Reduced ability to tolerate any impact through UE or spine   []Reduced ability to ambulate prolonged functional periods/distances   []other:    Participation Restrictions   []Reduced participation in self care activities   [x]Reduced participation in home management activities   []Reduced participation in work activities   []Reduced participation in social activities. []Reduced participation in sport/recreational activities. Classification/Subgrouping:   [x]signs/symptoms consistent with neck pain with mobility deficits     []signs/symptoms consistent with neck pain with movement coordinated impairments    []signs/symptoms consistent with neck pain with radiating pain    []signs/symptoms consistent with neck pain with headaches (cervicogenic)    []Signs/symptoms consistent with nerve root involvement including myotome & dermatome dysfunction   []sign/symptoms consistent with facet dysfunction of cervical and thoracic spine    []signs/symptoms consistent suggesting central cord compression/UMN syndromes   []signs/symptoms consistent with discogenic cervical pain   []signs/symptoms consistent with rib dysfunction   []signs/symptoms consistent with postural dysfunction   []signs/symptoms consistent with shoulder pathology    []signs/symptoms consistent with post-surgical status including decreased ROM, strength and function.    []signs/symptoms consistent with pathology which may benefit from Dry Needling   []signs/symptoms which may limit the use of advanced manual therapy techniques: (Elevated CV risk profile, recent trauma, intolerance to end range positions, prior TIA, visual issues, UE neurological compromise )     Prognosis/Rehab Potential:      []Excellent   [x]Good    []Fair   []Poor    Tolerance of evaluation/treatment:    []Excellent   [x]Good    []Fair   []Poor    Physical Therapy Evaluation Complexity Justification  [x] A history of present problem with:  [] no personal factors and/or comorbidities that impact the plan of care;  [x]1-2 personal factors and/or comorbidities that impact the plan of care  []3 personal factors and/or comorbidities that impact the plan of care  [x] An examination of body systems using standardized tests and measures addressing any of the following: body structures and functions (impairments), activity limitations, and/or participation restrictions;:  [x] a total of 1-2 or more elements   [] a total of 3 or more elements   [] a total of 4 or more elements   [x] A clinical presentation with:  [x] stable and/or uncomplicated characteristics   [] evolving clinical presentation with changing characteristics  [] unstable and unpredictable characteristics;   [x] Clinical decision making of [x] low, [] moderate, [] high complexity using standardized patient assessment instrument and/or measurable assessment of functional outcome. [x] EVAL (LOW) 34511 (typically 20 minutes face-to-face)  [] EVAL (MOD) 14498 (typically 30 minutes face-to-face)  [] EVAL (HIGH) 11765 (typically 45 minutes face-to-face)  [] RE-EVAL     PLAN:   Frequency/Duration:  2 days per week for 6 Weeks:  Interventions:  [x]  Therapeutic exercise including: strength training, ROM, for cervical spine,scapula, core and Upper extremity, including postural re-education. [x]  NMR activation and proprioception for Deep cervical flexors, periscapular and RC muscles and Core, including postural re-education. [x]  Manual therapy as indicated for C/T spine, ribs, Soft tissue to include: Dry Needling/IASTM, STM, PROM, Gr I-IV mobilizations, manipulation. [x] Modalities as needed that may include: thermal agents, E-stim, Biofeedback, US, iontophoresis as indicated  [x] Patient education on joint protection, postural re-education, activity modification, progression of HEP. HEP instruction:    Access Code: [email or web address]  URL: [email or web address]  Date: 02/22/2023  Prepared by: Darrius Garcia    Exercises  Seated Cervical Retraction - 1 x daily - 7 x weekly - 3 sets - 10 reps - 5 hold  Seated Scapular Retraction - 1 x daily - 7 x weekly - 3 sets - 10 reps - 5 hold  Gentle Levator Scapulae Stretch - 1 x daily - 7 x weekly - 3 sets - 30 hold  Seated Assisted Cervical Rotation with Towel - 1 x daily - 7 x weekly - 3 sets - 10 reps - 5 hold        GOALS:  Patient stated goal: \"To be able to turn and rotate my head. \"  [] Progressing: [] Met: [] Not Met: [] Adjusted    Therapist goals for Patient:   Short Term Goals: To be achieved in: 2 weeks  1. Independent in HEP and progression per patient tolerance, in order to prevent re-injury. [] Progressing: [] Met: [] Not Met: [] Adjusted  2. Patient will have a decrease in pain to facilitate improvement in movement, function, and ADLs as indicated by Functional Deficits. [] Progressing: [] Met: [] Not Met: [] Adjusted    Long Term Goals: To be achieved in: 6 weeks  1. Decreased NDI functional outcome score by 5 points to assist with reaching prior level of function. [] Progressing: [] Met: [] Not Met: [] Adjusted  2. Patient will demonstrate increased cervical AROM to [facility] of cervical/thoracic spine to allow for proper joint functioning to facilitate return to looking over her shoulder while driving/backing up her car. [] Progressing: [] Met: [] Not Met: [] Adjusted  3. Patient will demonstrate an increase in postural awareness and control and activation of  Deep cervical stabilizers to allow for proper functional mobility as indicated by patients Functional Deficits. [] Progressing: [] Met: [] Not Met: [] Adjusted  4. Patient will return to reading without increased symptoms or restriction. [] Progressing: [] Met: [] Not Met: [] Adjusted        Electronically signed by:  Yuki David PT      Note: If patient does not return for scheduled/recommended follow up visits, this note will serve as a discharge from care along with the most recent update on progress.

## 2023-02-22 ENCOUNTER — HOSPITAL ENCOUNTER (OUTPATIENT)
Dept: PHYSICAL THERAPY | Age: 78
Setting detail: THERAPIES SERIES
Discharge: HOME | End: 2023-02-22
Payer: MEDICARE

## 2023-02-22 ENCOUNTER — OFFICE VISIT (OUTPATIENT)
Dept: PSYCHOLOGY | Age: 78
End: 2023-02-22
Payer: MEDICARE

## 2023-02-22 DIAGNOSIS — Z63.9 RELATIONSHIP PROBLEMS: Primary | ICD-10-CM

## 2023-02-22 PROCEDURE — 97161 PT EVAL LOW COMPLEX 20 MIN: CPT

## 2023-02-22 PROCEDURE — 97110 THERAPEUTIC EXERCISES: CPT

## 2023-02-22 PROCEDURE — 1036F TOBACCO NON-USER: CPT | Performed by: SOCIAL WORKER

## 2023-02-22 PROCEDURE — 1123F ACP DISCUSS/DSCN MKR DOCD: CPT | Performed by: SOCIAL WORKER

## 2023-02-22 PROCEDURE — 90832 PSYTX W PT 30 MINUTES: CPT | Performed by: SOCIAL WORKER

## 2023-02-22 PROCEDURE — 97112 NEUROMUSCULAR REEDUCATION: CPT

## 2023-02-22 SDOH — SOCIAL STABILITY - SOCIAL INSECURITY: PROBLEM RELATED TO PRIMARY SUPPORT GROUP, UNSPECIFIED: Z63.9

## 2023-02-22 ASSESSMENT — PATIENT HEALTH QUESTIONNAIRE - PHQ9
SUM OF ALL RESPONSES TO PHQ QUESTIONS 1-9: 0
5. POOR APPETITE OR OVEREATING: 0
1. LITTLE INTEREST OR PLEASURE IN DOING THINGS: 0
3. TROUBLE FALLING OR STAYING ASLEEP: 0
9. THOUGHTS THAT YOU WOULD BE BETTER OFF DEAD, OR OF HURTING YOURSELF: 0
SUM OF ALL RESPONSES TO PHQ QUESTIONS 1-9: 0
SUM OF ALL RESPONSES TO PHQ9 QUESTIONS 1 & 2: 0
8. MOVING OR SPEAKING SO SLOWLY THAT OTHER PEOPLE COULD HAVE NOTICED. OR THE OPPOSITE, BEING SO FIGETY OR RESTLESS THAT YOU HAVE BEEN MOVING AROUND A LOT MORE THAN USUAL: 0
2. FEELING DOWN, DEPRESSED OR HOPELESS: 0
SUM OF ALL RESPONSES TO PHQ QUESTIONS 1-9: 0
SUM OF ALL RESPONSES TO PHQ QUESTIONS 1-9: 0
4. FEELING TIRED OR HAVING LITTLE ENERGY: 0
6. FEELING BAD ABOUT YOURSELF - OR THAT YOU ARE A FAILURE OR HAVE LET YOURSELF OR YOUR FAMILY DOWN: 0
7. TROUBLE CONCENTRATING ON THINGS, SUCH AS READING THE NEWSPAPER OR WATCHING TELEVISION: 0

## 2023-02-22 ASSESSMENT — ANXIETY QUESTIONNAIRES
5. BEING SO RESTLESS THAT IT IS HARD TO SIT STILL: 0
1. FEELING NERVOUS, ANXIOUS, OR ON EDGE: 0
4. TROUBLE RELAXING: 0
6. BECOMING EASILY ANNOYED OR IRRITABLE: 0
7. FEELING AFRAID AS IF SOMETHING AWFUL MIGHT HAPPEN: 0
2. NOT BEING ABLE TO STOP OR CONTROL WORRYING: 0
GAD7 TOTAL SCORE: 0
3. WORRYING TOO MUCH ABOUT DIFFERENT THINGS: 0

## 2023-02-22 NOTE — FLOWSHEET NOTE
[facility]. [address]  Phone: [phone]   Fax:     [phone]    Physical Therapy Treatment Note/ Progress Report:     Date:  2023    Patient Name:  Chantel Keyes    :  1945  MRN: [id number]    Pertinent Medical History: Additional Pertinent Hx: OA, HPL, Asthma    Medical/Treatment Diagnosis Information:  Medical Diagnosis: Neck pain [M54.2]  Treatment Diagnosis: Decreased mobility and strength. Insurance/Certification information:  PT Insurance Information: [employer]  Physician Information:  Watson Ellis, *  Plan of care signed (Y/N): SENT     Date of Patient follow up with Physician:      Progress Report: []  Yes  [x]  No     Date Range for reporting period:  Beginnin2023  Ending:     Progress report due (10 Rx/or 30 days whichever is less):      Recertification due (POC duration/ or 90 days whichever is less): 23     Visit # Insurance/POC Allowable Auth Needed   1  [employer] [x]Yes    []No     Functional Outcomes Measure:    Date Assessed: at eval  Test: NDI  Score: 10    Pain level:  2/10     HISTORY OF INJURY:  Patient report neck pain that began at the end of 2022. SUSY: stood up and fell and hit her chin, which extended her neck. She states she fell on a soft play mat. She denies symptoms of a concussion. No LOC after the fall. Pt denies dizziness. Pt denies changes in vision. Pt denies headaches. She reports cracking/popping noises in her neck. Pain is located primarily on the L side. Pt reports her pain is a lot better since November. Pt denies history of falls.     SUBJECTIVE:  See eval    OBJECTIVE:   Observation:   Test measurements:      RESTRICTIONS/PRECAUTIONS: None     Exercises/Interventions:   Therapeutic Ex (23932)  Min: Resistance/Repetitions Notes   Cervical rotation with towel 10x5 sec  Both sides   Levator Scap Str 3x30 sec                                  Manual Intervention (60798)  Min:     Cerv mobs/manip     Thoracic mobs/manip     Manual traction X5 min, intermittently     Rib mobilizations     STM          NMR re-education (79255)  Min:     Chin Tucks     Scapular retraction                Therapeutic Activity (56245)  Min:               Modalities  Min:                  Other Therapeutic Activities:  Pt was educated on PT POC, Diagnosis, Prognosis, pathomechanics as well as frequency and duration of scheduling future physical therapy appointments. Time was also taken on this day to answer all patient questions and participation in PT. Reviewed appointment policy in detail with patient and patient verbalized understanding. Home Exercise Program:    Therapeutic Exercise and NMR EXR  [] (04805) Provided verbal/tactile cueing for activities related to strengthening, flexibility, endurance, ROM  for improvements in cervical, postural, scapular, scapulothoracic and UE control with self care, reaching, carrying, lifting, house/yardwork, driving/computer work.    [] (54431) Provided verbal/tactile cueing for activities related to improving balance, coordination, kinesthetic sense, posture, motor skill, proprioception  to assist with cervical, scapular, scapulothoracic and UE control with self care, reaching, carrying, lifting, house/yardwork, driving/computer work. Therapeutic Activities:    [] (29390 or 74421) Provided verbal/tactile cueing for activities related to improving balance, coordination, kinesthetic sense, posture, motor skill, proprioception and motor activation to allow for proper function of cervical, scapular, scapulothoracic and UE control with self care, carrying, lifting, driving/computer work.      Home Exercise Program:    [] (78707) Reviewed/Progressed HEP activities related to strengthening, flexibility, endurance, ROM of cervical, scapular, scapulothoracic and UE control with self care, reaching, carrying, lifting, house/yardwork, driving/computer work  [] (21033) Reviewed/Progressed HEP activities related to improving balance, coordination, kinesthetic sense, posture, motor skill, proprioception of cervical, scapular, scapulothoracic and UE control with self care, reaching, carrying, lifting, house/yardwork, driving/computer work      Manual Treatments:  PROM / STM / Oscillations-Mobs:  G-I, II, III, IV (PA's, Inf., Post.)  [] ([id number]) Provided manual therapy to mobilize soft tissue/joints of cervical/CT, scapular GHJ and UE for the purpose of decreasing headache, modulating pain, promoting relaxation,  increasing ROM, reducing/eliminating soft tissue swelling/inflammation/restriction, improving soft tissue extensibility and allowing for proper ROM for normal function with self care, reaching, carrying, lifting, house/yardwork, driving/computer work    If Tyler Bowen Please Indicate Time In/Out  CPT Code Time in Time out                                   Approval Dates:  CPT Code Units Approved Units Used  Date Updated:                     Charges:  Timed Code Treatment Minutes: 25   Total Treatment Minutes: 50     [x] EVAL (LOW) 03195 (typically 20 minutes face-to-face)  [] EVAL (MOD) 52718 (typically 30 minutes face-to-face)  [] EVAL (HIGH) 85986 (typically 45 minutes face-to-face)  [] RE-EVAL     [x] KG(24244) x     [] Dry needle 1 or 2 Muscles (29874)  [x] NMR (48848) x     [] Dry needle 3+ Muscles (36048)  [] Manual (86602) x     [] Ultrasound (63749) x  [] TA (56653) x     [] Mech Traction (24023)  [] ES(attended) (87389)     [] ES (un) (45281):   [] Vasopump (88684) [] Ionto (82085)   [] Other:    GOALS:  Patient stated goal: \"To be able to turn and rotate my head. \"  [] Progressing: [] Met: [] Not Met: [] Adjusted     Therapist goals for Patient:   Short Term Goals: To be achieved in: 2 weeks  1. Independent in HEP and progression per patient tolerance, in order to prevent re-injury. [] Progressing: [] Met: [] Not Met: [] Adjusted  2. Patient will have a decrease in pain to facilitate improvement in movement, function, and ADLs as indicated by Functional Deficits. [] Progressing: [] Met: [] Not Met: [] Adjusted     Long Term Goals: To be achieved in: 6 weeks  1. Decreased NDI functional outcome score by 5 points to assist with reaching prior level of function. [] Progressing: [] Met: [] Not Met: [] Adjusted  2. Patient will demonstrate increased cervical AROM to [facility] of cervical/thoracic spine to allow for proper joint functioning to facilitate return to looking over her shoulder while driving/backing up her car. [] Progressing: [] Met: [] Not Met: [] Adjusted  3. Patient will demonstrate an increase in postural awareness and control and activation of  Deep cervical stabilizers to allow for proper functional mobility as indicated by patients Functional Deficits. [] Progressing: [] Met: [] Not Met: [] Adjusted  4. Patient will return to reading without increased symptoms or restriction. [] Progressing: [] Met: [] Not Met: [] Adjusted    ASSESSMENT:  See eval    Treatment/Activity Tolerance:  [x] Patient tolerated treatment well [] Patient limited by fatique  [] Patient limited by pain  [] Patient limited by other medical complications  [] Other:     Overall Progression Towards Functional goals/ Treatment Progress Update:  [] Patient is progressing as expected towards functional goals listed. [] Progression is slowed due to complexities/Impairments listed. [] Progression has been slowed due to co-morbidities.   [x] Plan just implemented, too soon to assess goals progression <30days   [] Goals require adjustment due to lack of progress  [] Patient is not progressing as expected and requires additional follow up with physician  [] Other    Prognosis for POC: [x] Good [] Fair  [] Poor    Patient requires continued skilled intervention: [x] Yes  [] No        PLAN: See eval  [] Continue per plan of care [] Alter current plan (see comments)  [x] Plan of care initiated [] Hold pending MD visit [] Discharge    Electronically signed by: Yuki David PT    Note: If patient does not return for scheduled/recommended follow up visits, this note will serve as a discharge from care along with the most recent update on progress.

## 2023-02-27 ENCOUNTER — HOSPITAL ENCOUNTER (OUTPATIENT)
Dept: PHYSICAL THERAPY | Age: 78
Setting detail: THERAPIES SERIES
Discharge: HOME | End: 2023-02-27
Payer: MEDICARE

## 2023-02-27 PROCEDURE — 97110 THERAPEUTIC EXERCISES: CPT

## 2023-02-27 PROCEDURE — 97140 MANUAL THERAPY 1/> REGIONS: CPT

## 2023-02-27 PROCEDURE — 97112 NEUROMUSCULAR REEDUCATION: CPT

## 2023-02-27 NOTE — FLOWSHEET NOTE
[facility] [place]. [address]  Phone: [phone]   Fax:     [phone]    Physical Therapy Treatment Note/ Progress Report:     Date:  2023    Patient Name:  Ab Samuel    :  1945  MRN: [id number]    Pertinent Medical History: Additional Pertinent Hx: OA, HPL, Asthma    Medical/Treatment Diagnosis Information:  Medical Diagnosis: Neck pain [M54.2]  Treatment Diagnosis: Decreased mobility and strength. Insurance/Certification information:  PT Insurance Information: [employer]  Physician Information:  Bing May, *  Plan of care signed (Y/N): SENT     Date of Patient follow up with Physician:      Progress Report: []  Yes  [x]  No     Date Range for reporting period:  Beginnin2023  Ending:     Progress report due (10 Rx/or 30 days whichever is less):      Recertification due (POC duration/ or 90 days whichever is less): 23     Visit # Insurance/POC Allowable Auth Needed   2  [employer] [x]Yes    []No     Functional Outcomes Measure:    Date Assessed: at eval  Test: NDI  Score: 10    Pain level:  210     HISTORY OF INJURY:  Patient report neck pain that began at the end of 2022. SUSY: stood up and fell and hit her chin, which extended her neck. She states she fell on a soft play mat. She denies symptoms of a concussion. No LOC after the fall. Pt denies dizziness. Pt denies changes in vision. Pt denies headaches. She reports cracking/popping noises in her neck. Pain is located primarily on the L side. Pt reports her pain is a lot better since November. Pt denies history of falls. SUBJECTIVE:    23: Pt reports compliance with her HEP. She states her neck is sore today.      OBJECTIVE:   Observation:   Test measurements:      RESTRICTIONS/PRECAUTIONS: None     Exercises/Interventions:   Therapeutic Ex (68389)  Min: 18' Resistance/Repetitions Notes   Shoulder Pulleys 2x1 min Warm up    Cervical rotation with towel 10x5 sec  Both sides   Levator Scap Str 3x30 sec    Rows  Shoulder Ext Yellow, 2x10  Yellow, 2x10                             Manual Intervention (94215)  Min: 15'     Cerv mobs/manip PA mobs    STM Cervical paraspinals, levator scap    Manual traction x5 min, intermittently     Rib mobilizations     STM          NMR re-education (52569)  Min:8'     Chin Tucks 10x3 sec    Scapular retraction  10x3 sec    Tandem balance  PERFORM NV  In // bars                   Therapeutic Activity (99135)  Min:               Modalities  Min:                  Other Therapeutic Activities:  Pt was educated on PT POC, Diagnosis, Prognosis, pathomechanics as well as frequency and duration of scheduling future physical therapy appointments. Time was also taken on this day to answer all patient questions and participation in PT. Reviewed appointment policy in detail with patient and patient verbalized understanding. Home Exercise Program:    Therapeutic Exercise and NMR EXR  [x] (04249) Provided verbal/tactile cueing for activities related to strengthening, flexibility, endurance, ROM  for improvements in cervical, postural, scapular, scapulothoracic and UE control with self care, reaching, carrying, lifting, house/yardwork, driving/computer work.    [] (93026) Provided verbal/tactile cueing for activities related to improving balance, coordination, kinesthetic sense, posture, motor skill, proprioception  to assist with cervical, scapular, scapulothoracic and UE control with self care, reaching, carrying, lifting, house/yardwork, driving/computer work.     Therapeutic Activities:    [x] (84700 or 18542) Provided verbal/tactile cueing for activities related to improving balance, coordination, kinesthetic sense, posture, motor skill, proprioception and motor activation to allow for proper function of cervical, scapular, scapulothoracic and UE control with self care, carrying, lifting, driving/computer work. Home Exercise Program:    [x] (96179) Reviewed/Progressed HEP activities related to strengthening, flexibility, endurance, ROM of cervical, scapular, scapulothoracic and UE control with self care, reaching, carrying, lifting, house/yardwork, driving/computer work  [] (42950) Reviewed/Progressed HEP activities related to improving balance, coordination, kinesthetic sense, posture, motor skill, proprioception of cervical, scapular, scapulothoracic and UE control with self care, reaching, carrying, lifting, house/yardwork, driving/computer work      Manual Treatments:  PROM / STM / Oscillations-Mobs:  G-I, II, III, IV (PA's, Inf., Post.)  [x] (05859) Provided manual therapy to mobilize soft tissue/joints of cervical/CT, scapular GHJ and UE for the purpose of decreasing headache, modulating pain, promoting relaxation,  increasing ROM, reducing/eliminating soft tissue swelling/inflammation/restriction, improving soft tissue extensibility and allowing for proper ROM for normal function with self care, reaching, carrying, lifting, house/yardwork, driving/computer work    I    Charges:  Timed Code Treatment Minutes: 41   Total Treatment Minutes: 45     [] EVAL (LOW) 98995 (typically 20 minutes face-to-face)  [] EVAL (MOD) 93633 (typically 30 minutes face-to-face)  [] EVAL (HIGH) 31874 (typically 45 minutes face-to-face)  [] RE-EVAL     [x] GL(97563) x     [] Dry needle 1 or 2 Muscles (91636)  [x] NMR (27894) x     [] Dry needle 3+ Muscles (08402)  [x] Manual (22136) x     [] Ultrasound (25291) x  [] TA (50895) x     [] Mech Traction (80258)  [] ES(attended) (71724)     [] ES (un) (40741):   [] Vasopump (32736) [] Ionto (52141)   [] Other:    GOALS:  Patient stated goal: \"To be able to turn and rotate my head. \"  [] Progressing: [] Met: [] Not Met: [] Adjusted     Therapist goals for Patient:   Short Term Goals: To be achieved in: 2 weeks  1.  Independent in HEP and progression per patient tolerance, in order to prevent re-injury. [] Progressing: [] Met: [] Not Met: [] Adjusted  2. Patient will have a decrease in pain to facilitate improvement in movement, function, and ADLs as indicated by Functional Deficits. [] Progressing: [] Met: [] Not Met: [] Adjusted     Long Term Goals: To be achieved in: 6 weeks  1. Decreased NDI functional outcome score by 5 points to assist with reaching prior level of function. [] Progressing: [] Met: [] Not Met: [] Adjusted  2. Patient will demonstrate increased cervical AROM to [facility] of cervical/thoracic spine to allow for proper joint functioning to facilitate return to looking over her shoulder while driving/backing up her car. [] Progressing: [] Met: [] Not Met: [] Adjusted  3. Patient will demonstrate an increase in postural awareness and control and activation of  Deep cervical stabilizers to allow for proper functional mobility as indicated by patients Functional Deficits. [] Progressing: [] Met: [] Not Met: [] Adjusted  4. Patient will return to reading without increased symptoms or restriction. [] Progressing: [] Met: [] Not Met: [] Adjusted    ASSESSMENT:  Pt demonstrates good tolerance to PT follow up appointment. Pt requires verbal and tactile cues for proper form during therex. Tightness in L levator scap and suboccipitals. Continue to progress postural strengthening and mobility as tolerated. Treatment/Activity Tolerance:  [x] Patient tolerated treatment well [] Patient limited by fatique  [] Patient limited by pain  [] Patient limited by other medical complications  [] Other:     Overall Progression Towards Functional goals/ Treatment Progress Update:  [] Patient is progressing as expected towards functional goals listed. [] Progression is slowed due to complexities/Impairments listed. [] Progression has been slowed due to co-morbidities.   [x] Plan just implemented, too soon to assess goals progression <30days   [] Goals require adjustment due to lack of progress  [] Patient is not progressing as expected and requires additional follow up with physician  [] Other    Prognosis for POC: [x] Good [] Fair  [] Poor    Patient requires continued skilled intervention: [x] Yes  [] No        PLAN: See eval  [] Continue per plan of care [] Alter current plan (see comments)  [x] Plan of care initiated [] Hold pending MD visit [] Discharge    Electronically signed by: Nichole Montemayor PT    Note: If patient does not return for scheduled/recommended follow up visits, this note will serve as a discharge from care along with the most recent update on progress.

## 2023-03-01 ENCOUNTER — HOSPITAL ENCOUNTER (OUTPATIENT)
Dept: PHYSICAL THERAPY | Age: 78
Setting detail: THERAPIES SERIES
Discharge: HOME | End: 2023-03-01
Payer: MEDICARE

## 2023-03-01 PROCEDURE — 97110 THERAPEUTIC EXERCISES: CPT

## 2023-03-01 PROCEDURE — 97140 MANUAL THERAPY 1/> REGIONS: CPT

## 2023-03-01 NOTE — FLOWSHEET NOTE
[facility] [address]  Phone: [phone]   Fax:     [phone]    Physical Therapy Treatment Note/ Progress Report:     Date:  2023    Patient Name:  Nakita Mari    :  1945  MRN: [id number]    Pertinent Medical History: Additional Pertinent Hx: OA, HPL, Asthma    Medical/Treatment Diagnosis Information:  Medical Diagnosis: Neck pain [M54.2]  Treatment Diagnosis: Decreased mobility and strength. Insurance/Certification information:  PT Insurance Information: [employer]  Physician Information:  Monroe Nelson, *  Plan of care signed (Y/N): SENT     Date of Patient follow up with Physician:      Progress Report: []  Yes  [x]  No     Date Range for reporting period:  Beginning: 3/1/2023  Ending:     Progress report due (10 Rx/or 30 days whichever is less): 3/46/40     Recertification due (POC duration/ or 90 days whichever is less): 23     Visit # Insurance/POC Allowable Auth Needed   3  [employer] Medicare [x]Yes    []No     Functional Outcomes Measure:    Date Assessed: at eval  Test: NDI  Score: 10    Pain level:  2/10     HISTORY OF INJURY:  Patient report neck pain that began at the end of 2022. SUSY: stood up and fell and hit her chin, which extended her neck. She states she fell on a soft play mat. She denies symptoms of a concussion. No LOC after the fall. Pt denies dizziness. Pt denies changes in vision. Pt denies headaches. She reports cracking/popping noises in her neck. Pain is located primarily on the L side. Pt reports her pain is a lot better since November. Pt denies history of falls. SUBJECTIVE:    23: Pt reports compliance with her HEP. She states her neck is sore today. 3/1/23: Pt reports the chin tucks bother her jaw. She states the L side of her neck is still bothersome.  She reports no specific mechanism for the increase in L sided neck pain, the pain seems to come and go randomly. OBJECTIVE:   Observation:   Test measurements:      RESTRICTIONS/PRECAUTIONS: None     Exercises/Interventions:   Therapeutic Ex (38945)  Min: 20' Resistance/Repetitions Notes   Shoulder Pulleys 2x1 min Warm up    Cervical rotation with towel 10x5 sec  Both sides   Levator Scap Str 3x30 sec    Rows  Shoulder Ext Yellow, 2x10  Yellow, 2x10    Supine cervical rotation 2x5 to each side    Supine chin tuck 2x5     Controlled opening, supine 2x5 Keeping tip of tongue in contact with roof of mouth             Manual Intervention (36263)  Min: 20'     Cerv mobs/manip PA mobs,  Cervical side glides     STM Cervical paraspinals, levator scap    Manual traction x5 min, intermittently     Rib mobilizations     STM          NMR re-education (55286)  Min:     Chin Tucks, seated HOLD - leads to jaw discomfort   Scapular retraction  10x3 sec    Tandem balance  PERFORM NV  In // bars                   Therapeutic Activity (89970)  Min:               Modalities  Min: 10'     MHP Cervical, 10 min           Other Therapeutic Activities:  Pt was educated on PT POC, Diagnosis, Prognosis, pathomechanics as well as frequency and duration of scheduling future physical therapy appointments. Time was also taken on this day to answer all patient questions and participation in PT. Reviewed appointment policy in detail with patient and patient verbalized understanding. Home Exercise Program:  Access Code: [email or web address]  URL: [email or web address]  Date: 03/01/2023  Prepared by: Cheryl Stauffer    Exercises  Supine Chin Tuck - 1 x daily - 7 x weekly - 2 sets - 10 reps  Supine Cervical Rotation AROM on Pillow - 1 x daily - 7 x weekly - 2 sets - 10 reps      Therapeutic Exercise and NMR EXR  [x] (56648) Provided verbal/tactile cueing for activities related to strengthening, flexibility, endurance, ROM  for improvements in cervical, postural, scapular, scapulothoracic and UE control with self care, reaching, carrying, lifting, house/yardwork, driving/computer work.    [] (60524) Provided verbal/tactile cueing for activities related to improving balance, coordination, kinesthetic sense, posture, motor skill, proprioception  to assist with cervical, scapular, scapulothoracic and UE control with self care, reaching, carrying, lifting, house/yardwork, driving/computer work. Therapeutic Activities:    [x] (00983 or 50968) Provided verbal/tactile cueing for activities related to improving balance, coordination, kinesthetic sense, posture, motor skill, proprioception and motor activation to allow for proper function of cervical, scapular, scapulothoracic and UE control with self care, carrying, lifting, driving/computer work.      Home Exercise Program:    [x] (16723) Reviewed/Progressed HEP activities related to strengthening, flexibility, endurance, ROM of cervical, scapular, scapulothoracic and UE control with self care, reaching, carrying, lifting, house/yardwork, driving/computer work  [] (88948) Reviewed/Progressed HEP activities related to improving balance, coordination, kinesthetic sense, posture, motor skill, proprioception of cervical, scapular, scapulothoracic and UE control with self care, reaching, carrying, lifting, house/yardwork, driving/computer work      Manual Treatments:  PROM / STM / Oscillations-Mobs:  G-I, II, III, IV (PA's, Inf., Post.)  [x] (09924) Provided manual therapy to mobilize soft tissue/joints of cervical/CT, scapular GHJ and UE for the purpose of decreasing headache, modulating pain, promoting relaxation,  increasing ROM, reducing/eliminating soft tissue swelling/inflammation/restriction, improving soft tissue extensibility and allowing for proper ROM for normal function with self care, reaching, carrying, lifting, house/yardwork, driving/computer work        Charges:  Timed Code Treatment Minutes: 40   Total Treatment Minutes: 50     [] PATRICIA (LOW) 58930 (typically 20 minutes face-to-face)  [] EVAL (MOD) 03297 (typically 30 minutes face-to-face)  [] EVAL (HIGH) 06502 (typically 45 minutes face-to-face)  [] RE-EVAL     [x] OH(50948) x 1    [] Dry needle 1 or 2 Muscles (03806)  [] NMR (87546) x     [] Dry needle 3+ Muscles (37509)  [x] Manual (94141) x 2     [] Ultrasound (85290) x  [] TA (40027) x     [] Mech Traction (39260)  [] ES(attended) (46266)     [] ES (un) (70521):   [] Vasopump (15243) [] Ionto (61614)   [] Other:    GOALS:  Patient stated goal: \"To be able to turn and rotate my head. \"  [] Progressing: [] Met: [] Not Met: [] Adjusted     Therapist goals for Patient:   Short Term Goals: To be achieved in: 2 weeks  1. Independent in HEP and progression per patient tolerance, in order to prevent re-injury. [] Progressing: [] Met: [] Not Met: [] Adjusted  2. Patient will have a decrease in pain to facilitate improvement in movement, function, and ADLs as indicated by Functional Deficits. [] Progressing: [] Met: [] Not Met: [] Adjusted     Long Term Goals: To be achieved in: 6 weeks  1. Decreased NDI functional outcome score by 5 points to assist with reaching prior level of function. [] Progressing: [] Met: [] Not Met: [] Adjusted  2. Patient will demonstrate increased cervical AROM to [facility] of cervical/thoracic spine to allow for proper joint functioning to facilitate return to looking over her shoulder while driving/backing up her car. [] Progressing: [] Met: [] Not Met: [] Adjusted  3. Patient will demonstrate an increase in postural awareness and control and activation of  Deep cervical stabilizers to allow for proper functional mobility as indicated by patients Functional Deficits. [] Progressing: [] Met: [] Not Met: [] Adjusted  4. Patient will return to reading without increased symptoms or restriction. [] Progressing: [] Met: [] Not Met: [] Adjusted    ASSESSMENT:  Pt demonstrates good-fair tolerance to PT follow up appointment.  Pt requires verbal and tactile cues for proper form during therex. Exercises modified to a supine position today secondary to jaw discomfort. Pt has increased cervical rotation to R compared to the L. Hypomobility evident throughout cervical spine. Good tolerance to addition of side glides. Continue to progress postural strengthening and mobility as tolerated. Treatment/Activity Tolerance:  [x] Patient tolerated treatment well [] Patient limited by fatique  [] Patient limited by pain  [] Patient limited by other medical complications  [] Other:     Overall Progression Towards Functional goals/ Treatment Progress Update:  [] Patient is progressing as expected towards functional goals listed. [] Progression is slowed due to complexities/Impairments listed. [] Progression has been slowed due to co-morbidities. [x] Plan just implemented, too soon to assess goals progression <30days   [] Goals require adjustment due to lack of progress  [] Patient is not progressing as expected and requires additional follow up with physician  [] Other    Prognosis for POC: [x] Good [] Fair  [] Poor    Patient requires continued skilled intervention: [x] Yes  [] No        PLAN: See eval  [] Continue per plan of care [] Alter current plan (see comments)  [x] Plan of care initiated [] Hold pending MD visit [] Discharge    Electronically signed by: James Tidwell PT    Note: If patient does not return for scheduled/recommended follow up visits, this note will serve as a discharge from care along with the most recent update on progress.

## 2023-03-05 DIAGNOSIS — D72.819 LEUKOPENIA, UNSPECIFIED TYPE: Primary | ICD-10-CM

## 2023-03-06 ENCOUNTER — HOSPITAL ENCOUNTER (OUTPATIENT)
Dept: PHYSICAL THERAPY | Age: 78
Setting detail: THERAPIES SERIES
Discharge: HOME | End: 2023-03-06
Payer: MEDICARE

## 2023-03-06 PROCEDURE — 97110 THERAPEUTIC EXERCISES: CPT

## 2023-03-06 PROCEDURE — 97140 MANUAL THERAPY 1/> REGIONS: CPT

## 2023-03-06 NOTE — FLOWSHEET NOTE
[facility]. [address]  Phone: [phone]   Fax:     [phone]    Physical Therapy Treatment Note/ Progress Report:     Date:  2023    Patient Name:  Beth Flores    :  1945  MRN: [id number]    Pertinent Medical History: Additional Pertinent Hx: OA, HPL, Asthma    Medical/Treatment Diagnosis Information:  Medical Diagnosis: Neck pain [M54.2]  Treatment Diagnosis: Decreased mobility and strength. Insurance/Certification information:  PT Insurance Information: [employer]  Physician Information:  Kallie Buenrostro, *  Plan of care signed (Y/N): SENT     Date of Patient follow up with Physician:      Progress Report: []  Yes  [x]  No     Date Range for reporting period:  Beginning: 3/6/2023  Ending:     Progress report due (10 Rx/or 30 days whichever is less): 50     Recertification due (POC duration/ or 90 days whichever is less): 23     Visit # Insurance/POC Allowable Auth Needed   4  [employer] [x]Yes    []No     Functional Outcomes Measure:    Date Assessed: at eval  Test: NDI  Score: 10    Pain level:  2/10     HISTORY OF INJURY:  Patient report neck pain that began at the end of 2022. SUSY: stood up and fell and hit her chin, which extended her neck. She states she fell on a soft play mat. She denies symptoms of a concussion. No LOC after the fall. Pt denies dizziness. Pt denies changes in vision. Pt denies headaches. She reports cracking/popping noises in her neck. Pain is located primarily on the L side. Pt reports her pain is a lot better since November. Pt denies history of falls. SUBJECTIVE:    23: Pt reports compliance with her HEP. She states her neck is sore today. 3/1/23: Pt reports the chin tucks bother her jaw. She states the L side of her neck is still bothersome.  She reports no specific mechanism for the increase in L sided neck pain, the pain seems to come and go randomly. 3/6/23: Pt reports her neck feels a little bit better, but most days the soreness is about the same. OBJECTIVE:   Observation:   Test measurements:      RESTRICTIONS/PRECAUTIONS: None     Exercises/Interventions:   Therapeutic Ex (72370)  Min: 20' Resistance/Repetitions Notes   Shoulder Pulleys 2x1 min Warm up    Cervical rotation with towel 10x5 sec  Both sides   Levator Scap Str 3x30 sec    Rows  Shoulder Ext Yellow, 2x10  Yellow, 2x10    Supine cervical rotation 2x5 to each side    Supine chin tuck x10    Supine cervical lateral flexion 3x30 sec                   Manual Intervention (19547)  Min: 20'     Cerv mobs/manip PA mobs,  Cervical side glides     STM Cervical paraspinals, levator scap    Manual traction x5 min, intermittently     Rib mobilizations     STM          NMR re-education (50823)  Min:     Chin Tucks, seated HOLD - leads to jaw discomfort   Scapular retraction  10x3 sec    Tandem balance  PERFORM NV  In // bars                   Therapeutic Activity (42155)  Min:               Modalities  Min:      MHP Cervical, 10 min           Other Therapeutic Activities:  Pt was educated on PT POC, Diagnosis, Prognosis, pathomechanics as well as frequency and duration of scheduling future physical therapy appointments. Time was also taken on this day to answer all patient questions and participation in PT. Reviewed appointment policy in detail with patient and patient verbalized understanding. Home Exercise Program:  Access Code: [email or web address]  URL: [email or web address]  Date: 03/01/2023  Prepared by: Andrei Fry    Exercises  Supine Chin Tuck - 1 x daily - 7 x weekly - 2 sets - 10 reps  Supine Cervical Rotation AROM on Pillow - 1 x daily - 7 x weekly - 2 sets - 10 reps      Therapeutic Exercise and NMR EXR  [x] (22894) Provided verbal/tactile cueing for activities related to strengthening, flexibility, endurance, ROM  for improvements in cervical, postural, scapular, scapulothoracic and UE control with self care, reaching, carrying, lifting, house/yardwork, driving/computer work.    [] (71179) Provided verbal/tactile cueing for activities related to improving balance, coordination, kinesthetic sense, posture, motor skill, proprioception  to assist with cervical, scapular, scapulothoracic and UE control with self care, reaching, carrying, lifting, house/yardwork, driving/computer work. Therapeutic Activities:    [x] (06484 or 55370) Provided verbal/tactile cueing for activities related to improving balance, coordination, kinesthetic sense, posture, motor skill, proprioception and motor activation to allow for proper function of cervical, scapular, scapulothoracic and UE control with self care, carrying, lifting, driving/computer work.      Home Exercise Program:    [x] (36525) Reviewed/Progressed HEP activities related to strengthening, flexibility, endurance, ROM of cervical, scapular, scapulothoracic and UE control with self care, reaching, carrying, lifting, house/yardwork, driving/computer work  [] (33411) Reviewed/Progressed HEP activities related to improving balance, coordination, kinesthetic sense, posture, motor skill, proprioception of cervical, scapular, scapulothoracic and UE control with self care, reaching, carrying, lifting, house/yardwork, driving/computer work      Manual Treatments:  PROM / STM / Oscillations-Mobs:  G-I, II, III, IV (PA's, Inf., Post.)  [x] (94362) Provided manual therapy to mobilize soft tissue/joints of cervical/CT, scapular GHJ and UE for the purpose of decreasing headache, modulating pain, promoting relaxation,  increasing ROM, reducing/eliminating soft tissue swelling/inflammation/restriction, improving soft tissue extensibility and allowing for proper ROM for normal function with self care, reaching, carrying, lifting, house/yardwork, driving/computer work        Charges:  Timed Code Treatment Minutes: 40   Total Treatment Minutes: 50     [] EVAL (LOW) 51550 (typically 20 minutes face-to-face)  [] EVAL (MOD) 92387 (typically 30 minutes face-to-face)  [] EVAL (HIGH) 83305 (typically 45 minutes face-to-face)  [] RE-EVAL     [x] VA(55420) x 1    [] Dry needle 1 or 2 Muscles (62083)  [] NMR (88456) x     [] Dry needle 3+ Muscles (90772)  [x] Manual (31706) x 2     [] Ultrasound (78384) x  [] TA (25206) x     [] Mech Traction (28739)  [] ES(attended) (38066)     [] ES (un) (23725):   [] Vasopump (96906) [] Ionto (91706)   [] Other:    GOALS:  Patient stated goal: \"To be able to turn and rotate my head. \"  [] Progressing: [] Met: [] Not Met: [] Adjusted     Therapist goals for Patient:   Short Term Goals: To be achieved in: 2 weeks  1. Independent in HEP and progression per patient tolerance, in order to prevent re-injury. [] Progressing: [] Met: [] Not Met: [] Adjusted  2. Patient will have a decrease in pain to facilitate improvement in movement, function, and ADLs as indicated by Functional Deficits. [] Progressing: [] Met: [] Not Met: [] Adjusted     Long Term Goals: To be achieved in: 6 weeks  1. Decreased NDI functional outcome score by 5 points to assist with reaching prior level of function. [] Progressing: [] Met: [] Not Met: [] Adjusted  2. Patient will demonstrate increased cervical AROM to [facility] of cervical/thoracic spine to allow for proper joint functioning to facilitate return to looking over her shoulder while driving/backing up her car. [] Progressing: [] Met: [] Not Met: [] Adjusted  3. Patient will demonstrate an increase in postural awareness and control and activation of  Deep cervical stabilizers to allow for proper functional mobility as indicated by patients Functional Deficits. [] Progressing: [] Met: [] Not Met: [] Adjusted  4. Patient will return to reading without increased symptoms or restriction.    [] Progressing: [] Met: [] Not Met: [] Adjusted    ASSESSMENT:  Pt demonstrates good-fair tolerance to PT follow up appointment. Pt requires verbal and tactile cues for proper form during therex. Exercises modified to a supine position today secondary to jaw discomfort. Pt has increased cervical rotation and lateral flexion to R compared to the L. Hypomobility evident throughout cervical spine. Continue to progress postural strengthening and mobility as tolerated. Treatment/Activity Tolerance:  [x] Patient tolerated treatment well [] Patient limited by fatique  [] Patient limited by pain  [] Patient limited by other medical complications  [] Other:     Overall Progression Towards Functional goals/ Treatment Progress Update:  [] Patient is progressing as expected towards functional goals listed. [] Progression is slowed due to complexities/Impairments listed. [] Progression has been slowed due to co-morbidities. [x] Plan just implemented, too soon to assess goals progression <30days   [] Goals require adjustment due to lack of progress  [] Patient is not progressing as expected and requires additional follow up with physician  [] Other    Prognosis for POC: [x] Good [] Fair  [] Poor    Patient requires continued skilled intervention: [x] Yes  [] No        PLAN: See eval  [] Continue per plan of care [] Alter current plan (see comments)  [x] Plan of care initiated [] Hold pending MD visit [] Discharge    Electronically signed by: Lucas Garcia PT    Note: If patient does not return for scheduled/recommended follow up visits, this note will serve as a discharge from care along with the most recent update on progress.

## 2023-03-08 ENCOUNTER — TELEPHONE (OUTPATIENT)
Dept: FAMILY MEDICINE CLINIC | Age: 78
End: 2023-03-08

## 2023-03-08 ENCOUNTER — NURSE ONLY (OUTPATIENT)
Dept: FAMILY MEDICINE CLINIC | Age: 78
End: 2023-03-08
Payer: MEDICARE

## 2023-03-08 DIAGNOSIS — D72.819 LEUKOPENIA, UNSPECIFIED TYPE: ICD-10-CM

## 2023-03-08 DIAGNOSIS — R41.9 COGNITIVE COMPLAINTS: Primary | ICD-10-CM

## 2023-03-08 LAB
BASOPHILS ABSOLUTE: 0 K/UL (ref 0–0.2)
BASOPHILS RELATIVE PERCENT: 0.5 %
EOSINOPHILS ABSOLUTE: 0.1 K/UL (ref 0–0.6)
EOSINOPHILS RELATIVE PERCENT: 3.9 %
HCT VFR BLD CALC: 40.1 % (ref 36–48)
HEMOGLOBIN: 13.4 G/DL (ref 12–16)
LYMPHOCYTES ABSOLUTE: 1.1 K/UL (ref 1–5.1)
LYMPHOCYTES RELATIVE PERCENT: 31.1 %
MCH RBC QN AUTO: 28.7 PG (ref 26–34)
MCHC RBC AUTO-ENTMCNC: 33.4 G/DL (ref 31–36)
MCV RBC AUTO: 85.7 FL (ref 80–100)
MONOCYTES ABSOLUTE: 0.4 K/UL (ref 0–1.3)
MONOCYTES RELATIVE PERCENT: 12.2 %
NEUTROPHILS ABSOLUTE: 1.9 K/UL (ref 1.7–7.7)
NEUTROPHILS RELATIVE PERCENT: 52.3 %
PDW BLD-RTO: 14.2 % (ref 12.4–15.4)
PLATELET # BLD: 248 K/UL (ref 135–450)
PMV BLD AUTO: 8 FL (ref 5–10.5)
RBC # BLD: 4.68 M/UL (ref 4–5.2)
WBC # BLD: 3.6 K/UL (ref 4–11)

## 2023-03-08 PROCEDURE — 36415 COLL VENOUS BLD VENIPUNCTURE: CPT | Performed by: FAMILY MEDICINE

## 2023-03-08 NOTE — PROGRESS NOTES
Patient also is requesting referral to [facility] instead of [facility].

## 2023-03-08 NOTE — TELEPHONE ENCOUNTER
Pt came in and had lab work done and dropped off a paper requesting a referral and I placed in Maddison's basket.

## 2023-03-08 NOTE — PROGRESS NOTES
Labs drawn via Right Arm using 21 G x 1 1/2\" drawing needle on the first attempt without difficulty. Manual pressure applied to site upon completion of venipuncture without any bleeding, Band-aid applied over site.     Patient tolerated lab procedure well: Yes    Tubes used:  1 Lavender

## 2023-03-09 ENCOUNTER — HOSPITAL ENCOUNTER (OUTPATIENT)
Dept: PHYSICAL THERAPY | Age: 78
Setting detail: THERAPIES SERIES
Discharge: HOME | End: 2023-03-09
Payer: MEDICARE

## 2023-03-09 DIAGNOSIS — J30.89 SEASONAL ALLERGIC RHINITIS DUE TO OTHER ALLERGIC TRIGGER: ICD-10-CM

## 2023-03-09 NOTE — FLOWSHEET NOTE
[facility] [address]  Phone [phone]  Fax [phone]             Physical Therapy  Cancellation/No-show Note  Patient Name:  Janee Rowe  :  1945   Date:  3/9/2023  Cancelled visits to date: 1  No-shows to date: 0    For today's appointment patient:  [x]  Cancelled  []  Rescheduled appointment  []  No-show     Reason given by patient:  []  Patient ill  []  Conflicting appointment  []  No transportation    []  Conflict with work  []  No reason given  [x]  Other:  Pt had a headache and to cancel appt. Comments:      Phone call information:   []  Phone call made today to patient at _ time at number provided:      []  Patient answered, conversation as follows:    []  Patient did not answer, message left as follows:  []  Phone call not made today  [x]  Phone call not needed - pt contacted us to cancel and provided reason for cancellation.      Electronically signed by:  Bear Ochoa PT, Alok Atkins 86, OMT-C    Physical Therapist [place] license #[id number]  Physical Therapist [place] license #[id number]

## 2023-03-10 RX ORDER — AZELASTINE 1 MG/ML
SPRAY, METERED NASAL
Qty: 3 EACH | Refills: 3 | Status: SHIPPED | OUTPATIENT
Start: 2023-03-10

## 2023-03-14 ENCOUNTER — HOSPITAL ENCOUNTER (OUTPATIENT)
Dept: PHYSICAL THERAPY | Age: 78
Setting detail: THERAPIES SERIES
Discharge: HOME | End: 2023-03-14
Payer: MEDICARE

## 2023-03-14 DIAGNOSIS — J45.40 REACTIVE AIRWAY DISEASE, MODERATE PERSISTENT, UNCOMPLICATED: ICD-10-CM

## 2023-03-14 PROCEDURE — 97112 NEUROMUSCULAR REEDUCATION: CPT | Performed by: PHYSICAL THERAPIST

## 2023-03-14 PROCEDURE — 97140 MANUAL THERAPY 1/> REGIONS: CPT | Performed by: PHYSICAL THERAPIST

## 2023-03-14 PROCEDURE — 97110 THERAPEUTIC EXERCISES: CPT | Performed by: PHYSICAL THERAPIST

## 2023-03-14 NOTE — FLOWSHEET NOTE
[address] [facility]. Rem[address] Castillo [address]  Phone: [phone]   Fax:     [phone]    Physical Therapy Treatment Note/ Progress Report:     Date:  2023    Patient Name:  Sonia Davila    :  1945  MRN: [id number]    Pertinent Medical History: Additional Pertinent Hx: OA, HPL, Asthma    Medical/Treatment Diagnosis Information:  Medical Diagnosis: Neck pain [M54.2]  Treatment Diagnosis: Decreased mobility and strength. Insurance/Certification information:  PT Insurance Information: [employer] Medicare  Physician Information:  mAadeo Agarwal, *  Plan of care signed (Y/N): SENT     Date of Patient follow up with Physician:      Progress Report: []  Yes  [x]  No     Date Range for reporting period:  Beginnin23  Ending:     Progress report due (10 Rx/or 30 days whichever is less): 3/85/57     Recertification due (POC duration/ or 90 days whichever is less): 23     Visit # Insurance/POC Allowable Auth Needed   5 [employer] Medicare [x]Yes    []No     Functional Outcomes Measure:    Date Assessed: at eval  Test: NDI  Score: 10    Pain level:  2/10     HISTORY OF INJURY:  Patient report neck pain that began at the end of 2022. SUSY: stood up and fell and hit her chin, which extended her neck. She states she fell on a soft play mat. She denies symptoms of a concussion. No LOC after the fall. Pt denies dizziness. Pt denies changes in vision. Pt denies headaches. She reports cracking/popping noises in her neck. Pain is located primarily on the L side. Pt reports her pain is a lot better since November. Pt denies history of falls. SUBJECTIVE:  Pt states that neck is really stiff. She has pain in L side of head. Denies Headaches. She has been limited in neck motion for a long time. She relates that she does not back up due to limited mobility.        OBJECTIVE:   Observation: Neck ROM in Supine full motion; sitting neck rotation 50% R 75% L ; Test measurements:      RESTRICTIONS/PRECAUTIONS: None     Exercises/Interventions:   Therapeutic Ex (93571)   Resistance/Repetitions Notes   Shoulder Pulleys Warm up    Cervical rotation with towel 10x5 sec  Both sides   Levator Scap Str    Trunk rotation in sitting 3x30\" ea    Forward flex into ext in sitting 5\"x5    Rows  Shoulder Ext Red 2x10  Red 2x10 Red TB given for HEP   Supine cervical rotation    Supine chin tuck    Supine cervical lateral flexion                   Manual Intervention (59688)       Cerv mobs/manip PA mobs,  Cervical side glides     STM Cervical paraspinals, levator scap    Manual traction x5 min, intermittently     Rib mobilizations     STM          NMR re-education (33387)       Chin Tucks, seated HOLD - leads to jaw discomfort   Scapular retraction  10x3 sec    SLS 30\"x1 ea L side unstable   Quadriped swimmer X20 UE + LE              Therapeutic Activity (02954)                 Modalities       MHP Cervical, 10 min           Other Therapeutic Activities:  Pt was educated on PT POC, Diagnosis, Prognosis, pathomechanics as well as frequency and duration of scheduling future physical therapy appointments. Time was also taken on this day to answer all patient questions and participation in PT. Reviewed appointment policy in detail with patient and patient verbalized understanding. Home Exercise Program:  Access Code: [email or web address]  URL: [email or web address]  Date: 03/14/2023  Prepared by: Venkatesh Scott      Therapeutic Exercise and NMR EXR  [x] (70229) Provided verbal/tactile cueing for activities related to strengthening, flexibility, endurance, ROM  for improvements in cervical, postural, scapular, scapulothoracic and UE control with self care, reaching, carrying, lifting, house/yardwork, driving/computer work.    [] (73156) Provided verbal/tactile cueing for activities related to improving balance, coordination, kinesthetic sense, posture, motor skill, proprioception  to assist with cervical, scapular, scapulothoracic and UE control with self care, reaching, carrying, lifting, house/yardwork, driving/computer work. Therapeutic Activities:    [x] (40118 or 66942) Provided verbal/tactile cueing for activities related to improving balance, coordination, kinesthetic sense, posture, motor skill, proprioception and motor activation to allow for proper function of cervical, scapular, scapulothoracic and UE control with self care, carrying, lifting, driving/computer work.      Home Exercise Program:    [x] (20180) Reviewed/Progressed HEP activities related to strengthening, flexibility, endurance, ROM of cervical, scapular, scapulothoracic and UE control with self care, reaching, carrying, lifting, house/yardwork, driving/computer work  [] (01751) Reviewed/Progressed HEP activities related to improving balance, coordination, kinesthetic sense, posture, motor skill, proprioception of cervical, scapular, scapulothoracic and UE control with self care, reaching, carrying, lifting, house/yardwork, driving/computer work      Manual Treatments:  PROM / STM / Oscillations-Mobs:  G-I, II, III, IV (PA's, Inf., Post.)  [x] (44799) Provided manual therapy to mobilize soft tissue/joints of cervical/CT, scapular GHJ and UE for the purpose of decreasing headache, modulating pain, promoting relaxation,  increasing ROM, reducing/eliminating soft tissue swelling/inflammation/restriction, improving soft tissue extensibility and allowing for proper ROM for normal function with self care, reaching, carrying, lifting, house/yardwork, driving/computer work        Charges:  Timed Code Treatment Minutes: 45   Total Treatment Minutes: 45     [] EVAL (LOW) 35156 (typically 20 minutes face-to-face)  [] EVAL (MOD) 42756 (typically 30 minutes face-to-face)  [] EVAL (HIGH) 59804 (typically 45 minutes face-to-face)  [] RE-EVAL     [x] CJ(33396) x 20'   [] Dry needle 1 or 2 Muscles (71994)  [x] NMR (18125) x  10'  [] Dry needle 3+ Muscles (20347)  [x] Manual (62243) x 15'     [] Ultrasound (91844) x  [] TA (94202) x     [] Mech Traction (62687)  [] ES(attended) (67534)     [] ES (un) (09372):   [] Vasopump (32995) [] Ionto (91389)   [] Other:    GOALS:  Patient stated goal: \"To be able to turn and rotate my head. \"  [] Progressing: [] Met: [] Not Met: [] Adjusted     Therapist goals for Patient:   Short Term Goals: To be achieved in: 2 weeks  1. Independent in HEP and progression per patient tolerance, in order to prevent re-injury. [] Progressing: [] Met: [] Not Met: [] Adjusted  2. Patient will have a decrease in pain to facilitate improvement in movement, function, and ADLs as indicated by Functional Deficits. [] Progressing: [] Met: [] Not Met: [] Adjusted     Long Term Goals: To be achieved in: 6 weeks  1. Decreased NDI functional outcome score by 5 points to assist with reaching prior level of function. [] Progressing: [] Met: [] Not Met: [] Adjusted  2. Patient will demonstrate increased cervical AROM to [facility] of cervical/thoracic spine to allow for proper joint functioning to facilitate return to looking over her shoulder while driving/backing up her car. [] Progressing: [] Met: [] Not Met: [] Adjusted  3. Patient will demonstrate an increase in postural awareness and control and activation of  Deep cervical stabilizers to allow for proper functional mobility as indicated by patients Functional Deficits. [] Progressing: [] Met: [] Not Met: [] Adjusted  4. Patient will return to reading without increased symptoms or restriction. [] Progressing: [] Met: [] Not Met: [] Adjusted    ASSESSMENT:  Pt was very tender w/ palpation in mid back region and along R side of neck but denied pain with it. Pt is very limited in neck and thoracic spine and states this is her norm but not painful. Pt did loosen up to assist w/ rotation for safe driving.  Muscle activation for retraction needed assistance initially but improved w/ repetition. Poor core control noted w/ balance on SLS and quadriped activity. Pt has trouble relating where pain is head and neck but tenderness on Right side today vs L. Treatment/Activity Tolerance:  [x] Patient tolerated treatment well [] Patient limited by fatique  [] Patient limited by pain  [] Patient limited by other medical complications  [] Other:     Overall Progression Towards Functional goals/ Treatment Progress Update:  [] Patient is progressing as expected towards functional goals listed. [] Progression is slowed due to complexities/Impairments listed. [] Progression has been slowed due to co-morbidities. [x] Plan just implemented, too soon to assess goals progression <30days   [] Goals require adjustment due to lack of progress  [] Patient is not progressing as expected and requires additional follow up with physician  [] Other    Prognosis for POC: [x] Good [] Fair  [] Poor    Patient requires continued skilled intervention: [x] Yes  [] No        PLAN: Cont therapy for rehab of neck and improve functional movement. [] Continue per plan of care [] Alter current plan (see comments)  [x] Plan of care initiated [] Hold pending MD visit [] Discharge    Electronically signed by: Sergio Castillo, PT, MS, OMT-C    Physical Therapist [place] license #[id number]  Physical Therapist [place] license #[id number]       Note: If patient does not return for scheduled/recommended follow up visits, this note will serve as a discharge from care along with the most recent update on progress.

## 2023-03-15 RX ORDER — FLUTICASONE PROPIONATE 110 UG/1
AEROSOL, METERED RESPIRATORY (INHALATION)
Qty: 36 G | Refills: 3 | Status: SHIPPED | OUTPATIENT
Start: 2023-03-15

## 2023-03-16 ENCOUNTER — HOSPITAL ENCOUNTER (OUTPATIENT)
Dept: PHYSICAL THERAPY | Age: 78
Setting detail: THERAPIES SERIES
Discharge: HOME | End: 2023-03-16
Payer: MEDICARE

## 2023-03-16 PROCEDURE — 97140 MANUAL THERAPY 1/> REGIONS: CPT | Performed by: PHYSICAL THERAPIST

## 2023-03-16 PROCEDURE — 97112 NEUROMUSCULAR REEDUCATION: CPT | Performed by: PHYSICAL THERAPIST

## 2023-03-16 PROCEDURE — 97110 THERAPEUTIC EXERCISES: CPT | Performed by: PHYSICAL THERAPIST

## 2023-03-16 NOTE — FLOWSHEET NOTE
[facility] [place]. [address]  Phone: [phone]   Fax:     [phone]    Physical Therapy Treatment Note/ Progress Report:     Date:  2023    Patient Name:  Susanne Shah    :  1945  MRN: [id number]    Pertinent Medical History: Additional Pertinent Hx: OA, HPL, Asthma    Medical/Treatment Diagnosis Information:  Medical Diagnosis: Neck pain [M54.2]  Treatment Diagnosis: Decreased mobility and strength. Insurance/Certification information:  PT Insurance Information: [employer] Medicare  Physician Information:  Milagro Dodson, *  Plan of care signed (Y/N): SENT     Date of Patient follow up with Physician:      Progress Report: []  Yes  [x]  No     Date Range for reporting period:  Beginnin23  Ending:     Progress report due (10 Rx/or 30 days whichever is less): 14     Recertification due (POC duration/ or 90 days whichever is less): 23     Visit # Insurance/POC Allowable Auth Needed   6 [employer] Medicare [x]Yes    []No     Functional Outcomes Measure:    Date Assessed: at eval  Test: NDI  Score: 10    Pain level:  2/10     HISTORY OF INJURY:  Patient report neck pain that began at the end of 2022. SUSY: stood up and fell and hit her chin, which extended her neck. She states she fell on a soft play mat. She denies symptoms of a concussion. No LOC after the fall. Pt denies dizziness. Pt denies changes in vision. Pt denies headaches. She reports cracking/popping noises in her neck. Pain is located primarily on the L side. Pt reports her pain is a lot better since November. Pt denies history of falls. SUBJECTIVE:  Pt states that neck is really stiff. She has stopped cervical retraction due to TMJ issues. She feels that she is getting better. OBJECTIVE:   Observation: Neck ROM in Supine full motion; sitting neck rotation 50% R 75% L ;   Test measurements:      RESTRICTIONS/PRECAUTIONS: None     Exercises/Interventions:   Therapeutic Ex (29506)   Resistance/Repetitions Notes   Shoulder Pulleys Warm up    Doorway pec stretch 10\"x10    Cervical rotation with towel 10x5 sec  Both sides   Levator Scap Str    Trunk rotation in sitting 3x30\" ea    Forward flex into ext in sitting 5\"x5    Rows  Shoulder Ext Red x30  Red x30 Red TB given for HEP   Supine cervical rotation    Supine chin tuck    Supine cervical lateral flexion                   Manual Intervention (32163)       Cerv mobs/manip PA mobs,  Cervical side glides     STM Cervical paraspinals, levator scap    Manual traction x5 min, intermittently     Rib mobilizations     STM          NMR re-education (79398)       Chin Tucks, seated HOLD - leads to jaw discomfort   Scapular retraction  10x3 sec    SLS 30\"x1 ea L side unstable   Quadriped swimmer X20 UE + LE    Monster walks Blue TB & 3# 3 laps         Therapeutic Activity (49320)                 Modalities       MHP Cervical, 10 min           Other Therapeutic Activities:  Pt was educated on PT POC, Diagnosis, Prognosis, pathomechanics as well as frequency and duration of scheduling future physical therapy appointments. Time was also taken on this day to answer all patient questions and participation in PT. Reviewed appointment policy in detail with patient and patient verbalized understanding. Home Exercise Program:  Access Code: [email or web address]  URL: [email or web address]  Date: 03/14/2023  Prepared by: Paula Pedersen      Therapeutic Exercise and NMR EXR  [x] (73809) Provided verbal/tactile cueing for activities related to strengthening, flexibility, endurance, ROM  for improvements in cervical, postural, scapular, scapulothoracic and UE control with self care, reaching, carrying, lifting, house/yardwork, driving/computer work.    [] (66685) Provided verbal/tactile cueing for activities related to improving balance, coordination, kinesthetic sense, posture, motor skill, proprioception  to assist with cervical, scapular, scapulothoracic and UE control with self care, reaching, carrying, lifting, house/yardwork, driving/computer work. Therapeutic Activities:    [x] (26781 or 40359) Provided verbal/tactile cueing for activities related to improving balance, coordination, kinesthetic sense, posture, motor skill, proprioception and motor activation to allow for proper function of cervical, scapular, scapulothoracic and UE control with self care, carrying, lifting, driving/computer work.      Home Exercise Program:    [x] (06568) Reviewed/Progressed HEP activities related to strengthening, flexibility, endurance, ROM of cervical, scapular, scapulothoracic and UE control with self care, reaching, carrying, lifting, house/yardwork, driving/computer work  [] (81686) Reviewed/Progressed HEP activities related to improving balance, coordination, kinesthetic sense, posture, motor skill, proprioception of cervical, scapular, scapulothoracic and UE control with self care, reaching, carrying, lifting, house/yardwork, driving/computer work      Manual Treatments:  PROM / STM / Oscillations-Mobs:  G-I, II, III, IV (PA's, Inf., Post.)  [x] (88747) Provided manual therapy to mobilize soft tissue/joints of cervical/CT, scapular GHJ and UE for the purpose of decreasing headache, modulating pain, promoting relaxation,  increasing ROM, reducing/eliminating soft tissue swelling/inflammation/restriction, improving soft tissue extensibility and allowing for proper ROM for normal function with self care, reaching, carrying, lifting, house/yardwork, driving/computer work        Charges:  Timed Code Treatment Minutes: 45   Total Treatment Minutes: 45     [] EVAL (LOW) 71261 (typically 20 minutes face-to-face)  [] EVAL (MOD) 02182 (typically 30 minutes face-to-face)  [] EVAL (HIGH) 13392 (typically 45 minutes face-to-face)  [] RE-EVAL     [x] RO(44707) x 20'   [] Dry needle 1 or 2 Muscles (53233)  [x] NMR (15877) x  10'  [] Dry needle 3+ Muscles (04024)  [x] Manual (20020) x 15'     [] Ultrasound (56209) x  [] TA (73511) x     [] Mech Traction (43957)  [] ES(attended) (19165)     [] ES (un) (21626):   [] Vasopump (27348) [] Ionto (23557)   [] Other:    GOALS:  Patient stated goal: \"To be able to turn and rotate my head. \"  [] Progressing: [] Met: [] Not Met: [] Adjusted     Therapist goals for Patient:   Short Term Goals: To be achieved in: 2 weeks  1. Independent in HEP and progression per patient tolerance, in order to prevent re-injury. [] Progressing: [] Met: [] Not Met: [] Adjusted  2. Patient will have a decrease in pain to facilitate improvement in movement, function, and ADLs as indicated by Functional Deficits. [] Progressing: [] Met: [] Not Met: [] Adjusted     Long Term Goals: To be achieved in: 6 weeks  1. Decreased NDI functional outcome score by 5 points to assist with reaching prior level of function. [] Progressing: [] Met: [] Not Met: [] Adjusted  2. Patient will demonstrate increased cervical AROM to [facility] of cervical/thoracic spine to allow for proper joint functioning to facilitate return to looking over her shoulder while driving/backing up her car. [] Progressing: [] Met: [] Not Met: [] Adjusted  3. Patient will demonstrate an increase in postural awareness and control and activation of  Deep cervical stabilizers to allow for proper functional mobility as indicated by patients Functional Deficits. [] Progressing: [] Met: [] Not Met: [] Adjusted  4. Patient will return to reading without increased symptoms or restriction. [] Progressing: [] Met: [] Not Met: [] Adjusted    ASSESSMENT:  Less tightness noted in L neck muscles and min to no tenderness. Pt did have tenderness on R side today which is usually after s/cs tech last session. Pt has hesitation to movement from neck and back. Pt was able to reach full rotation for driving w/ op by PT at end stretch. Overpressure was empty feel and min pressure only. Single leg balance is poor on L LE. Core control is still limited w/ mod fatigue from program.       Treatment/Activity Tolerance:  [x] Patient tolerated treatment well [] Patient limited by fatique  [] Patient limited by pain  [] Patient limited by other medical complications  [] Other:     Overall Progression Towards Functional goals/ Treatment Progress Update:  [] Patient is progressing as expected towards functional goals listed. [] Progression is slowed due to complexities/Impairments listed. [] Progression has been slowed due to co-morbidities. [x] Plan just implemented, too soon to assess goals progression <30days   [] Goals require adjustment due to lack of progress  [] Patient is not progressing as expected and requires additional follow up with physician  [] Other    Prognosis for POC: [x] Good [] Fair  [] Poor    Patient requires continued skilled intervention: [x] Yes  [] No        PLAN: Cont therapy for rehab of neck and improve functional movement. [] Continue per plan of care [] Alter current plan (see comments)  [x] Plan of care initiated [] Hold pending MD visit [] Discharge    Electronically signed by: Yulissa Roe PT, MS, OMT-C    Physical Therapist [address] [address] license #[id number]  Physical Therapist [place] license #[id number]       Note: If patient does not return for scheduled/recommended follow up visits, this note will serve as a discharge from care along with the most recent update on progress.

## 2023-03-21 ENCOUNTER — HOSPITAL ENCOUNTER (OUTPATIENT)
Dept: PHYSICAL THERAPY | Age: 78
Setting detail: THERAPIES SERIES
Discharge: HOME | End: 2023-03-21
Payer: MEDICARE

## 2023-03-21 PROCEDURE — 97140 MANUAL THERAPY 1/> REGIONS: CPT | Performed by: PHYSICAL THERAPIST

## 2023-03-21 PROCEDURE — 97112 NEUROMUSCULAR REEDUCATION: CPT | Performed by: PHYSICAL THERAPIST

## 2023-03-21 PROCEDURE — 97110 THERAPEUTIC EXERCISES: CPT | Performed by: PHYSICAL THERAPIST

## 2023-03-21 NOTE — FLOWSHEET NOTE
[address] [facility] [place]. [address]  Phone: [phone]   Fax:     [phone]    Physical Therapy Treatment Note/ Progress Report:     Date:  2023    Patient Name:  Erik Hankins    :  1945  MRN: [id number]    Pertinent Medical History: Additional Pertinent Hx: OA, HPL, Asthma    Medical/Treatment Diagnosis Information:  Medical Diagnosis: Neck pain [M54.2]  Treatment Diagnosis: Decreased mobility and strength. Insurance/Certification information:  PT Insurance Information: [employer] Medicare  Physician Information:  Kaelyn Henley, *  Plan of care signed (Y/N): SENT     Date of Patient follow up with Physician:      Progress Report: []  Yes  [x]  No     Date Range for reporting period:  Beginnin23  Ending:     Progress report due (10 Rx/or 30 days whichever is less):      Recertification due (POC duration/ or 90 days whichever is less): 23     Visit # Insurance/POC Allowable Auth Needed   7 [employer] [x]Yes    []No     Functional Outcomes Measure:    Date Assessed: at eval  Test: NDI  Score: 10    Pain level:  2/10     HISTORY OF INJURY:  Patient report neck pain that began at the end of 2022. SUSY: stood up and fell and hit her chin, which extended her neck. She states she fell on a soft play mat. She denies symptoms of a concussion. No LOC after the fall. Pt denies dizziness. Pt denies changes in vision. Pt denies headaches. She reports cracking/popping noises in her neck. Pain is located primarily on the L side. Pt reports her pain is a lot better since November. Pt denies history of falls. SUBJECTIVE:  Pt had some soreness after last session. She did have some pain in neck yesterday but better today. OBJECTIVE:   Observation: Neck ROM in Supine full motion; sitting neck rotation 50% R 75% L ;   Test measurements:

## 2023-03-23 ENCOUNTER — HOSPITAL ENCOUNTER (OUTPATIENT)
Dept: PHYSICAL THERAPY | Age: 78
Setting detail: THERAPIES SERIES
Discharge: HOME | End: 2023-03-23
Payer: MEDICARE

## 2023-03-23 PROCEDURE — 97110 THERAPEUTIC EXERCISES: CPT | Performed by: PHYSICAL THERAPIST

## 2023-03-23 PROCEDURE — 97140 MANUAL THERAPY 1/> REGIONS: CPT | Performed by: PHYSICAL THERAPIST

## 2023-03-23 PROCEDURE — 97112 NEUROMUSCULAR REEDUCATION: CPT | Performed by: PHYSICAL THERAPIST

## 2023-03-23 NOTE — FLOWSHEET NOTE
occipital region bilat. Mid back tightness and still hyper sensitive. Pt had good tech w/ exercises. Rotation in neck and back improved to where pt is now safer for backing up when driving. Pt felt that she had improved and pain had resolved that she wanted to continue with HEP. Pt has made progress toward goals and should continue to improve. Treatment/Activity Tolerance:  [x] Patient tolerated treatment well [] Patient limited by fatique  [] Patient limited by pain  [] Patient limited by other medical complications  [] Other:     Overall Progression Towards Functional goals/ Treatment Progress Update:  [] Patient is progressing as expected towards functional goals listed. [] Progression is slowed due to complexities/Impairments listed. [] Progression has been slowed due to co-morbidities. [x] Plan just implemented, too soon to assess goals progression <30days   [] Goals require adjustment due to lack of progress  [] Patient is not progressing as expected and requires additional follow up with physician  [] Other    Prognosis for POC: [x] Good [] Fair  [] Poor    Patient requires continued skilled intervention: [x] Yes  [] No        PLAN: Cont therapy for rehab of neck and improve functional movement. [] Continue per plan of care [] Alter current plan (see comments)  [x] Plan of care initiated [] Hold pending MD visit [] Discharge    Electronically signed by: Stephanie Heck, PT, MS, OMT-C    Physical Therapist [address] [address] license #[id number]  Physical Therapist [place] license #[id number]       Note: If patient does not return for scheduled/recommended follow up visits, this note will serve as a discharge from care along with the most recent update on progress.

## 2023-05-11 DIAGNOSIS — J45.40 REACTIVE AIRWAY DISEASE, MODERATE PERSISTENT, UNCOMPLICATED: ICD-10-CM

## 2023-05-12 RX ORDER — MONTELUKAST SODIUM 10 MG/1
TABLET ORAL
Qty: 90 TABLET | Refills: 3 | Status: SHIPPED | OUTPATIENT
Start: 2023-05-12

## 2023-05-16 NOTE — TELEPHONE ENCOUNTER
Physical Therapy Evaluation    Visit Count: 1     Plan of Care: 5/16/2023 Through: 8/8/2023  Insurance Information: Plan year 07/01/22-06/30/23  Payor: [facility]  Authorization Needed: NO  Maximum Visit Limit Per Year:  Based on Med Nec  CoPay: $30  Notes:   If applicable, Call Ref #: online  Referred by: Arti Szymanski MD; Next provider visit (if known/scheduled): None  Medical Diagnosis (from order):   Diagnosis Information      Diagnosis    625.1 (ICD-9-CM) - N94.2 (ICD-10-CM) - Vaginismus              Treatment Diagnosis: Pelvic floor dysfunction with increased pain/symptoms, impaired posture, impaired range of motion, impaired muscle length/flexibility, impaired tissue mobility, impaired activity tolerance, impaired motor function/performance/coordination, impaired sexual health    Date of onset/injury: A couple of years ago  Diagnosis Precautions: None  Chart reviewed at time of initial evaluation (relevant co-morbidities, allergies, tests and medications listed):   Patient Active Problem List   Diagnosis   • Acute pain of right shoulder     Current Outpatient Medications   Medication Sig   • hydrOXYzine (ATARAX) 25 MG tablet Take 1 tablet by mouth 2 times daily as needed for Anxiety.   • sertraline (ZOLOFT) 50 MG tablet Take 1 tablet by mouth daily.     No current facility-administered medications for this visit.         SUBJECTIVE   Patient referred to pelvic floor PT for pelvic floor pain that began a few years ago. No known onset or mechanism of injury.   Patient's mom has had similar issues, but there was no known cause for her issues.   Ovarian and uterine cancer has run in patient's family.   Patient works as an  for a car [employer]. Patient needs to call into work on the mornings she has had bad pain. She did also have an instance of where it happened at work and she needed to go home.  Patient does not have a regular exercise program that she likes to do. This is a goal of hers and her  She just had a AWV with [place]. wife's.     OB History    Para Term  AB Living   0 0 0 0 0 0   SAB IAB Ectopic Molar Multiple Live Births   0 0 0 0 0 0     Has not been pregnant, but has a goal in the future of having a child.     Medical/Surgical History:   • History of sexual abuse or trauma: No  • History of urinary tract infection: No  • Surgery: no     Bladder Symptoms    patient denies bladder symptoms    Bowel Symptoms:  Patient denies any bowel dysfunction. Denies straining or difficulty having bowel movements.   Frequency of bowel movements: 3-5 times per day     Pain:   Patient feels the pain came on gradually, and she was more aware of it after her wife noticed it during intimate activities. Used to be able to tolerate penetration, but now cannot tolerate any type of penetration, including a digital exam at the doctor's office. She has had the pediatric speculum used on her, and that is still quite painful.   Her wife is supportive of patient's current situation.   Pain does not correlate to her menstrual cycle. Patient's menstrual cycles are inconsistent. Some are fairly easy and others are heavier and more painful. She can go months without any pain episodes, or other times she can have this happen 3-4 times per month.   • Pain reduced tolerance of: pelvic exam, sexual activities,especially with over stimulation. Patient has not been having orgasms, but when she had an external orgasm it was uncomfortable and she felt \"raw\" after.  • Pain location: Anterior hips bilaterally then throughout vulvovaginal area/perineum and up through coccyx and sacrum.    • Pain description: \"like people are taking knives and stabbing me.\"  • Severity of pain: 10/10. Sometimes it is 6-8/10 with intercourse.   • What makes pain better? Lying in a fetal position with a heating pad on until she can move to the bed.   • Since onset pain is getting: worse, but her symptoms are very sporadic.    Patient states that these pain episodes happen more  so in the morning after she has gone to the bathroom.     Patient has slight scoliosis and does have some lower back pain. No coccyx injuries.     Dietary Habits:  Fluid consumption: 4-5 cans of diet pepsi per day, has been drinking surinder tea or lemonade at home, does not drink water at all.      Function:   Limitations/exacerbating factors: above described symptoms causing restriction in age appropriate activities, difficulty obtaining pelvic exams for routine medical care, limits community and social interaction  Prior level: no limitation in age appropriate activities  Patient Goals: \"Be able to be sexually active without pain.\"    Prior Treatment: no therapies in the past year for current condition. Hospitalization, home health services or skilled nursing facility in the last 30 days: No, per patient.  Home Environment/Social Support: Patient lives with significant other.  Patient has assistance as needed from family/friends.    Safety:  Do you feel safe at home, work and/or school? yes, per patient  Patient denies 2 or more falls or an unexplained fall with injury in the last year, further testing not required     OBJECTIVE   Formal objective measures not assessed today due to time constraints and increased time spent on patient education.     Patient is a pleasant female, A&O x 3.      Outcome/Assessments  Patient Specific Functional Scale:   Activity: Sexual activity, Score: 0  Activity: Pelvic examinations, Score: 0  Activity: Urinating in the morning, Score: 9  Average Score: 3  Each activity is scored: 0=unable to perform activity to 10=able to perform activity at the same level as before injury or problem      Initial Treatment   Initial evaluation completed.    Neuromuscular Reeducation:  Patient educated on the multi-factorial nature of pelvic pain including tight musculature, hypersensitivity of nerves and the nervous system, postural implications, pain spasm pain cycle, and emotional/mental components  behind pelvic pain. Educated patient on vaginismus and how it is an involuntary contraction of the pelvic floor when under perceived threat. Discussed with patient that pelvic floor and brain retraining can occur and lead to decreased pain. Discussed with patient doing an external and/or internal assessment at next therapy session. Discussed with patient what this type of exam entails so she is more comfortable with the idea of it for next therapy session.    Skilled input: as detailed above    Initial Home Program:  * above=instructed home program    Not yet established    Writer verbally educated the patient and received verbal consent from the patient on hand placement, positioning of patient, and techniques to be performed today including therapist position for techniques as described above and how they are pertinent to the patient's plan of care.     Suggestions for next session as indicated: progress per plan of care, external/internal assessment per patient consent, abdominal and lumbar assessments, establish HEP as appropriate    ASSESSMENT   29 year old female patient has signs and symptoms consistent with pelvic floor dysfunction that has reported functional limitations listed above. Patient has onset of pelvic pain that began a few years ago with no known mechanism of onset. Patient now has intense pain with age appropriate activities along with any type of pelvic assessment for routine medical care. She has moments where she cannot go into work due to the severity of the pain. Patient seems agreeable to an internal assessment and further objective measures at next therapy session to further identify impairments and establish treatment plan.     Patient will benefit from skilled therapy and Rehabilitative potential is good based on assessment above  Predicted patient presentation: Low (stable) - Patient comorbidities and complexities, as defined above, will have little effect on progress for prescribed  plan of care.    PLAN   Goals: To be obtained by end of this plan of care:  1. Patient independent with modified and progressed home exercise program.  2. Decrease pain/symptoms to 2/10  3. Improve involved range of motion to full contraction and lengthening of the pelvic floor   4. Demonstrate independent use of relaxation and stress management strategies associated with pelvic function  through improvements listed above patient will:  5. Engage in age appropriate activities with no greater than 2/10 at least 75% of the time to allow for improved interpersonal relations  6. Undergo a pelvic examination or anticipate a pelvic examination with use a speculum with no greater than 2/10 pain  7. Patient Specific Functional Scale (PSFS) will improve an average score of 3/10 to 8 (minimal detectable change (90%CI) for average score is 2 points, for single activity score is 3 points)    The following skilled interventions to be implemented to achieve above:  Activities of Daily Living/Self Care (71513)  Manual Therapy (27599)  Neuromuscular Re-Education (01152)  Therapeutic Activity (22725)  Therapeutic Exercise (21821)    Frequency/Duration: 1 times per week for 12 weeks with tapering as the patient progresses for an estimated total of 12 visits    patient involved in and agreed to plan of care and goals.   Attendance policy provided at time of evaluation.     Patient Education:   Who will be receiving education: patient  Are they ready to learn: yes  Preferred learning style: written, verbal, demonstration  Barriers to learning: no barriers apparent at this time   Result of initial outlined education: Verbalizes understanding    Therapy procedure time and total treatment time can be found documented on the Time Entry flowsheet

## 2023-06-11 NOTE — PROGRESS NOTES
Patient tolerated snack well, VSS, abd soft and non-tender. Discharge instructions discussed with patient, verbalized understanding. Patient ready for discharge. Unknown if ever smoked

## 2023-06-22 ENCOUNTER — TELEPHONE (OUTPATIENT)
Dept: FAMILY MEDICINE CLINIC | Age: 78
End: 2023-06-22

## 2023-06-22 ENCOUNTER — OFFICE VISIT (OUTPATIENT)
Dept: FAMILY MEDICINE CLINIC | Age: 78
End: 2023-06-22
Payer: MEDICARE

## 2023-06-22 VITALS
HEIGHT: 64 IN | SYSTOLIC BLOOD PRESSURE: 124 MMHG | WEIGHT: 125 LBS | HEART RATE: 82 BPM | DIASTOLIC BLOOD PRESSURE: 76 MMHG | OXYGEN SATURATION: 99 % | BODY MASS INDEX: 21.34 KG/M2

## 2023-06-22 DIAGNOSIS — F02.80 LATE ONSET ALZHEIMER'S DEMENTIA WITHOUT BEHAVIORAL DISTURBANCE, PSYCHOTIC DISTURBANCE, MOOD DISTURBANCE, OR ANXIETY, UNSPECIFIED DEMENTIA SEVERITY (HCC): Primary | ICD-10-CM

## 2023-06-22 DIAGNOSIS — J30.9 ALLERGIC RHINITIS, UNSPECIFIED SEASONALITY, UNSPECIFIED TRIGGER: ICD-10-CM

## 2023-06-22 DIAGNOSIS — G30.1 LATE ONSET ALZHEIMER'S DEMENTIA WITHOUT BEHAVIORAL DISTURBANCE, PSYCHOTIC DISTURBANCE, MOOD DISTURBANCE, OR ANXIETY, UNSPECIFIED DEMENTIA SEVERITY (HCC): Primary | ICD-10-CM

## 2023-06-22 DIAGNOSIS — R73.03 PREDIABETES: ICD-10-CM

## 2023-06-22 DIAGNOSIS — E78.5 HYPERLIPIDEMIA LDL GOAL <70: ICD-10-CM

## 2023-06-22 PROCEDURE — 1123F ACP DISCUSS/DSCN MKR DOCD: CPT | Performed by: FAMILY MEDICINE

## 2023-06-22 PROCEDURE — G8427 DOCREV CUR MEDS BY ELIG CLIN: HCPCS | Performed by: FAMILY MEDICINE

## 2023-06-22 PROCEDURE — 1036F TOBACCO NON-USER: CPT | Performed by: FAMILY MEDICINE

## 2023-06-22 PROCEDURE — G8420 CALC BMI NORM PARAMETERS: HCPCS | Performed by: FAMILY MEDICINE

## 2023-06-22 PROCEDURE — 1090F PRES/ABSN URINE INCON ASSESS: CPT | Performed by: FAMILY MEDICINE

## 2023-06-22 PROCEDURE — 99215 OFFICE O/P EST HI 40 MIN: CPT | Performed by: FAMILY MEDICINE

## 2023-06-22 PROCEDURE — G8399 PT W/DXA RESULTS DOCUMENT: HCPCS | Performed by: FAMILY MEDICINE

## 2023-06-22 RX ORDER — FLUTICASONE PROPIONATE 50 MCG
2 SPRAY, SUSPENSION (ML) NASAL DAILY
Qty: 16 G | Refills: 0 | Status: SHIPPED | OUTPATIENT
Start: 2023-06-22

## 2023-06-22 RX ORDER — DONEPEZIL HYDROCHLORIDE 5 MG/1
TABLET, FILM COATED ORAL
COMMUNITY
Start: 2023-06-20

## 2023-06-22 RX ORDER — DONEPEZIL HYDROCHLORIDE 10 MG/1
TABLET, FILM COATED ORAL
COMMUNITY
Start: 2023-06-20 | End: 2023-06-22

## 2023-06-22 RX ORDER — ROSUVASTATIN CALCIUM 5 MG/1
5 TABLET, COATED ORAL DAILY
Qty: 30 TABLET | Refills: 0 | Status: SHIPPED | OUTPATIENT
Start: 2023-06-22

## 2023-06-23 ENCOUNTER — TELEPHONE (OUTPATIENT)
Dept: FAMILY MEDICINE CLINIC | Age: 78
End: 2023-06-23

## 2023-06-23 NOTE — TELEPHONE ENCOUNTER
Pt called stating she is confused about the flonase she was prescribed and would like a call back.  Pt is reachable at [phone]

## 2023-06-23 NOTE — TELEPHONE ENCOUNTER
[facility] [employer], noted the change in OV note. Danilo Humphreys was notified. Will try Flonase in place of cetirizine. Cetirizine has been recommended against due to her Alzheimer's.

## 2023-07-11 ENCOUNTER — OFFICE VISIT (OUTPATIENT)
Dept: FAMILY MEDICINE CLINIC | Age: 78
End: 2023-07-11
Payer: MEDICARE

## 2023-07-11 ENCOUNTER — TELEPHONE (OUTPATIENT)
Dept: FAMILY MEDICINE CLINIC | Age: 78
End: 2023-07-11

## 2023-07-11 VITALS
BODY MASS INDEX: 21.51 KG/M2 | HEIGHT: 64 IN | WEIGHT: 126 LBS | OXYGEN SATURATION: 98 % | SYSTOLIC BLOOD PRESSURE: 138 MMHG | DIASTOLIC BLOOD PRESSURE: 70 MMHG | HEART RATE: 74 BPM

## 2023-07-11 DIAGNOSIS — G30.1 LATE ONSET ALZHEIMER'S DEMENTIA WITHOUT BEHAVIORAL DISTURBANCE, PSYCHOTIC DISTURBANCE, MOOD DISTURBANCE, OR ANXIETY, UNSPECIFIED DEMENTIA SEVERITY (HCC): ICD-10-CM

## 2023-07-11 DIAGNOSIS — K21.9 GASTROESOPHAGEAL REFLUX DISEASE, UNSPECIFIED WHETHER ESOPHAGITIS PRESENT: ICD-10-CM

## 2023-07-11 DIAGNOSIS — J45.909 MILD REACTIVE AIRWAYS DISEASE, UNSPECIFIED WHETHER PERSISTENT: ICD-10-CM

## 2023-07-11 DIAGNOSIS — F02.80 LATE ONSET ALZHEIMER'S DEMENTIA WITHOUT BEHAVIORAL DISTURBANCE, PSYCHOTIC DISTURBANCE, MOOD DISTURBANCE, OR ANXIETY, UNSPECIFIED DEMENTIA SEVERITY (HCC): ICD-10-CM

## 2023-07-11 DIAGNOSIS — Z01.818 PRE-OP EXAM: Primary | ICD-10-CM

## 2023-07-11 DIAGNOSIS — R94.31 NONSPECIFIC ST-T WAVE ELECTROCARDIOGRAPHIC CHANGES: ICD-10-CM

## 2023-07-11 DIAGNOSIS — H26.9 CATARACT OF RIGHT EYE, UNSPECIFIED CATARACT TYPE: ICD-10-CM

## 2023-07-11 PROCEDURE — 1036F TOBACCO NON-USER: CPT | Performed by: FAMILY MEDICINE

## 2023-07-11 PROCEDURE — 1123F ACP DISCUSS/DSCN MKR DOCD: CPT | Performed by: FAMILY MEDICINE

## 2023-07-11 PROCEDURE — G8427 DOCREV CUR MEDS BY ELIG CLIN: HCPCS | Performed by: FAMILY MEDICINE

## 2023-07-11 PROCEDURE — 1090F PRES/ABSN URINE INCON ASSESS: CPT | Performed by: FAMILY MEDICINE

## 2023-07-11 PROCEDURE — G8420 CALC BMI NORM PARAMETERS: HCPCS | Performed by: FAMILY MEDICINE

## 2023-07-11 PROCEDURE — 93000 ELECTROCARDIOGRAM COMPLETE: CPT | Performed by: FAMILY MEDICINE

## 2023-07-11 PROCEDURE — G8399 PT W/DXA RESULTS DOCUMENT: HCPCS | Performed by: FAMILY MEDICINE

## 2023-07-11 PROCEDURE — 99215 OFFICE O/P EST HI 40 MIN: CPT | Performed by: FAMILY MEDICINE

## 2023-07-11 NOTE — TELEPHONE ENCOUNTER
Please call cardiology to confirm machine read of EKG, please fax past 2 EKGs, thank you, pt with hx of mild mitral regurg

## 2023-07-11 NOTE — PATIENT INSTRUCTIONS
Hold aspirin, all nsaids, all vitamins , fish oil 1 week before surgery--- ask eye doc about when to restart

## 2023-07-11 NOTE — PROGRESS NOTES
Preoperative Consultation      Candis Mitchell             Chief Complaint   Patient presents with    Pre-op Exam     Cataract surgery  right eye - Jaron Mccartney - fax [phone]       Allergies   Allergen Reactions    Cyclobenzaprine Other (See Comments)     Patient states it causes her muscles not to work, thinks she was taking this to much         Current Outpatient Medications   Medication Sig Dispense Refill    donepezil (ARICEPT) 5 MG tablet TAKE 1 TABLET BY MOUTH WITH BREAKFAST.  TAKE FOR 30 DAYS THEN REFILL WITH 10 MG ARICEPT      rosuvastatin (CRESTOR) 5 MG tablet Take 1 tablet by mouth daily Please update pcp in 2 weeks on med 30 tablet 0    montelukast (SINGULAIR) 10 MG tablet TAKE 1 TABLET BY MOUTH EVERY NIGHT 90 tablet 3    clindamycin (CLEOCIN T) 1 % external solution APPLY TO THE AFFECTED AREA TWICE DAILY AS NEEDED FOR FLARES 60 mL 3    fluticasone (FLOVENT HFA) 110 MCG/ACT inhaler INHALE 2 PUFFS BY MOUTH TWICE DAILY 36 g 3    azelastine (ASTELIN) 0.1 % nasal spray USE 2 SPRAYS IN EACH NOSTRIL TWICE DAILY 3 each 3    triamcinolone (KENALOG) 0.1 % cream APPLY TOPICALLY TO THE AFFECTED AREA TWICE DAILY AS NEEDED FOR FLARES 80 g 1    albuterol sulfate HFA (PROAIR HFA) 108 (90 Base) MCG/ACT inhaler Inhale 2 puffs into the lungs every 4 hours as needed for Wheezing or Shortness of Breath 18 g 5    acetaminophen (TYLENOL) 500 MG tablet Take 1 tablet by mouth every 4 hours as needed for Pain 360 tablet 1    pantoprazole (PROTONIX) 40 MG tablet Take 1 tablet by mouth every morning (before breakfast) 30 tablet 5    tiZANidine (ZANAFLEX) 4 MG tablet 1/2 to 1 tid prn muscle spasm 30 tablet 3    ibuprofen (ADVIL;MOTRIN) 200 MG tablet Take 1 tablet by mouth every 6 hours as needed for Pain      Psyllium (METAMUCIL PO) Take 1 Scoop by mouth daily       Spacer/Aero-Holding Chambers (E-Z SPACER) CONCEPCION 1 Device by Does not apply route daily as needed 1 Device 0    Omega-3 Fatty Acids (FISH OIL)

## 2023-07-13 ENCOUNTER — TELEPHONE (OUTPATIENT)
Dept: FAMILY MEDICINE CLINIC | Age: 78
End: 2023-07-13

## 2023-07-13 RX ORDER — FLUTICASONE PROPIONATE 50 MCG
SPRAY, SUSPENSION (ML) NASAL
Qty: 16 G | Refills: 11 | Status: SHIPPED | OUTPATIENT
Start: 2023-07-13

## 2023-07-13 NOTE — TELEPHONE ENCOUNTER
No lab work needed per form review    Awaiting EKG confirmation of read--- it looks like EKG faxed, please be sure Cards called as well, Cards requests call and fax

## 2023-07-13 NOTE — TELEPHONE ENCOUNTER
Medication:   Requested Prescriptions     Pending Prescriptions Disp Refills    fluticasone (FLONASE) 50 MCG/ACT nasal spray [Pharmacy Med Name: FLUTICASONE 50MCG NASAL SP (120) RX] 16 g 0     Sig: USE 2 SPRAYS IN EACH NOSTRIL DAILY       Last Filled:  6/22/2023    Patient Phone Number: [phone] (home)     Last appt: 7/11/2023   Next appt: 8/31/2023

## 2023-07-13 NOTE — TELEPHONE ENCOUNTER
Pt was in for her pre op and there was no blood work done. Pt was reading paper work and it says she needs to send lab results to the surgeon. Please give Pt a call to discuss this.  Pt is reachable at [phone]

## 2023-07-13 NOTE — TELEPHONE ENCOUNTER
Pt had a pre-op physical on 7/11/23. This visit has not been signed clearing her for surgery. Calling to have visit signed.  Please call Shreyas Edwards when this has been completed at [phone]

## 2023-07-13 NOTE — TELEPHONE ENCOUNTER
Angelita Washington I see that you faxed over a review did you call and reach cardiology for review. Or hear anything further.

## 2023-07-14 NOTE — TELEPHONE ENCOUNTER
Cardiology read EKG as normal.  Patient is cleared for surgery. Please let patient and eye specialist know. Please fax note if needed (maybe able to see through Epic).

## 2023-07-14 NOTE — TELEPHONE ENCOUNTER
Please see phone encounter on 07/11/2023. EKG has been read by Cardiology and placed on Dr. Samy santana for review.

## 2023-07-14 NOTE — TELEPHONE ENCOUNTER
Called/spoke to pt, informed cleared for surgery. [facility] for fax number. They will have coordinator call me with fax number.

## 2023-07-17 ENCOUNTER — ANESTHESIA EVENT (OUTPATIENT)
Dept: SURGERY | Age: 78
End: 2023-07-17
Payer: MEDICARE

## 2023-07-18 ENCOUNTER — ANESTHESIA (OUTPATIENT)
Dept: SURGERY | Age: 78
End: 2023-07-18
Payer: MEDICARE

## 2023-07-18 ENCOUNTER — HOSPITAL ENCOUNTER (OUTPATIENT)
Age: 78
Discharge: HOME | End: 2023-07-18
Attending: STUDENT IN AN ORGANIZED HEALTH CARE EDUCATION/TRAINING PROGRAM | Admitting: STUDENT IN AN ORGANIZED HEALTH CARE EDUCATION/TRAINING PROGRAM
Payer: MEDICARE

## 2023-07-18 VITALS
HEIGHT: 64 IN | HEART RATE: 64 BPM | OXYGEN SATURATION: 98 % | TEMPERATURE: 96.9 F | DIASTOLIC BLOOD PRESSURE: 69 MMHG | BODY MASS INDEX: 23.73 KG/M2 | SYSTOLIC BLOOD PRESSURE: 116 MMHG | RESPIRATION RATE: 20 BRPM | WEIGHT: 139 LBS

## 2023-07-18 PROBLEM — H26.9 CATARACT: Status: ACTIVE | Noted: 2023-07-18

## 2023-07-18 PROBLEM — H26.9 CATARACT: Status: RESOLVED | Noted: 2023-07-18 | Resolved: 2023-07-18

## 2023-07-18 PROCEDURE — 3600000014 HC SURGERY LEVEL 4 ADDTL 15MIN: Performed by: STUDENT IN AN ORGANIZED HEALTH CARE EDUCATION/TRAINING PROGRAM

## 2023-07-18 PROCEDURE — 2580000003 HC RX 258: Performed by: STUDENT IN AN ORGANIZED HEALTH CARE EDUCATION/TRAINING PROGRAM

## 2023-07-18 PROCEDURE — 3600000004 HC SURGERY LEVEL 4 BASE: Performed by: STUDENT IN AN ORGANIZED HEALTH CARE EDUCATION/TRAINING PROGRAM

## 2023-07-18 PROCEDURE — 3700000001 HC ADD 15 MINUTES (ANESTHESIA): Performed by: STUDENT IN AN ORGANIZED HEALTH CARE EDUCATION/TRAINING PROGRAM

## 2023-07-18 PROCEDURE — 6360000002 HC RX W HCPCS: Performed by: NURSE ANESTHETIST, CERTIFIED REGISTERED

## 2023-07-18 PROCEDURE — 7100000010 HC PHASE II RECOVERY - FIRST 15 MIN: Performed by: STUDENT IN AN ORGANIZED HEALTH CARE EDUCATION/TRAINING PROGRAM

## 2023-07-18 PROCEDURE — V2632 POST CHMBR INTRAOCULAR LENS: HCPCS | Performed by: STUDENT IN AN ORGANIZED HEALTH CARE EDUCATION/TRAINING PROGRAM

## 2023-07-18 PROCEDURE — 6370000000 HC RX 637 (ALT 250 FOR IP): Performed by: STUDENT IN AN ORGANIZED HEALTH CARE EDUCATION/TRAINING PROGRAM

## 2023-07-18 PROCEDURE — 2709999900 HC NON-CHARGEABLE SUPPLY: Performed by: STUDENT IN AN ORGANIZED HEALTH CARE EDUCATION/TRAINING PROGRAM

## 2023-07-18 PROCEDURE — 6360000002 HC RX W HCPCS: Performed by: STUDENT IN AN ORGANIZED HEALTH CARE EDUCATION/TRAINING PROGRAM

## 2023-07-18 PROCEDURE — 3700000000 HC ANESTHESIA ATTENDED CARE: Performed by: STUDENT IN AN ORGANIZED HEALTH CARE EDUCATION/TRAINING PROGRAM

## 2023-07-18 PROCEDURE — 2500000003 HC RX 250 WO HCPCS: Performed by: STUDENT IN AN ORGANIZED HEALTH CARE EDUCATION/TRAINING PROGRAM

## 2023-07-18 DEVICE — LENS CLAREON IOL 17.5D: Type: IMPLANTABLE DEVICE | Site: ANTERIOR CHAMBER | Status: FUNCTIONAL

## 2023-07-18 RX ORDER — OFLOXACIN 3 MG/ML
SOLUTION/ DROPS OPHTHALMIC
Status: COMPLETED | OUTPATIENT
Start: 2023-07-18 | End: 2023-07-18

## 2023-07-18 RX ORDER — PHENYLEPHRINE HCL 2.5 %
1 DROPS OPHTHALMIC (EYE) SEE ADMIN INSTRUCTIONS
Status: COMPLETED | OUTPATIENT
Start: 2023-07-18 | End: 2023-07-18

## 2023-07-18 RX ORDER — BALANCED SALT SOLUTION 6.4; .75; .48; .3; 3.9; 1.7 MG/ML; MG/ML; MG/ML; MG/ML; MG/ML; MG/ML
SOLUTION OPHTHALMIC
Status: COMPLETED | OUTPATIENT
Start: 2023-07-18 | End: 2023-07-18

## 2023-07-18 RX ORDER — FENTANYL CITRATE 50 UG/ML
INJECTION, SOLUTION INTRAMUSCULAR; INTRAVENOUS PRN
Status: DISCONTINUED | OUTPATIENT
Start: 2023-07-18 | End: 2023-07-18 | Stop reason: SDUPTHER

## 2023-07-18 RX ORDER — TROPICAMIDE 10 MG/ML
1 SOLUTION/ DROPS OPHTHALMIC SEE ADMIN INSTRUCTIONS
Status: COMPLETED | OUTPATIENT
Start: 2023-07-18 | End: 2023-07-18

## 2023-07-18 RX ORDER — MIDAZOLAM HYDROCHLORIDE 1 MG/ML
INJECTION INTRAMUSCULAR; INTRAVENOUS PRN
Status: DISCONTINUED | OUTPATIENT
Start: 2023-07-18 | End: 2023-07-18 | Stop reason: SDUPTHER

## 2023-07-18 RX ORDER — SODIUM CHLORIDE 9 MG/ML
INJECTION, SOLUTION INTRAVENOUS PRN
Status: CANCELLED | OUTPATIENT
Start: 2023-07-18

## 2023-07-18 RX ORDER — ROSUVASTATIN CALCIUM 5 MG/1
TABLET, COATED ORAL
Qty: 90 TABLET | Refills: 3 | Status: SHIPPED | OUTPATIENT
Start: 2023-07-18

## 2023-07-18 RX ORDER — SODIUM CHLORIDE 0.9 % (FLUSH) 0.9 %
5-40 SYRINGE (ML) INJECTION PRN
Status: DISCONTINUED | OUTPATIENT
Start: 2023-07-18 | End: 2023-07-18 | Stop reason: HOSPADM

## 2023-07-18 RX ORDER — SODIUM CHLORIDE 9 MG/ML
INJECTION, SOLUTION INTRAVENOUS PRN
Status: DISCONTINUED | OUTPATIENT
Start: 2023-07-18 | End: 2023-07-18 | Stop reason: SDUPTHER

## 2023-07-18 RX ORDER — SODIUM CHLORIDE 9 MG/ML
INJECTION, SOLUTION INTRAVENOUS PRN
Status: DISCONTINUED | OUTPATIENT
Start: 2023-07-18 | End: 2023-07-18 | Stop reason: HOSPADM

## 2023-07-18 RX ORDER — TETRACAINE HYDROCHLORIDE 5 MG/ML
SOLUTION OPHTHALMIC
Status: COMPLETED | OUTPATIENT
Start: 2023-07-18 | End: 2023-07-18

## 2023-07-18 RX ORDER — TETRACAINE HYDROCHLORIDE 5 MG/ML
1 SOLUTION OPHTHALMIC SEE ADMIN INSTRUCTIONS
Status: DISCONTINUED | OUTPATIENT
Start: 2023-07-18 | End: 2023-07-18 | Stop reason: HOSPADM

## 2023-07-18 RX ORDER — SODIUM CHLORIDE 0.9 % (FLUSH) 0.9 %
5-40 SYRINGE (ML) INJECTION EVERY 12 HOURS SCHEDULED
Status: CANCELLED | OUTPATIENT
Start: 2023-07-18

## 2023-07-18 RX ORDER — CYCLOPENTOLATE HYDROCHLORIDE 10 MG/ML
1 SOLUTION/ DROPS OPHTHALMIC SEE ADMIN INSTRUCTIONS
Status: COMPLETED | OUTPATIENT
Start: 2023-07-18 | End: 2023-07-18

## 2023-07-18 RX ORDER — SODIUM CHLORIDE 0.9 % (FLUSH) 0.9 %
5-40 SYRINGE (ML) INJECTION EVERY 12 HOURS SCHEDULED
Status: DISCONTINUED | OUTPATIENT
Start: 2023-07-18 | End: 2023-07-18 | Stop reason: HOSPADM

## 2023-07-18 RX ORDER — KETOROLAC TROMETHAMINE 5 MG/ML
SOLUTION OPHTHALMIC
Status: COMPLETED | OUTPATIENT
Start: 2023-07-18 | End: 2023-07-18

## 2023-07-18 RX ORDER — PREDNISOLONE ACETATE 10 MG/ML
SUSPENSION/ DROPS OPHTHALMIC
Status: COMPLETED | OUTPATIENT
Start: 2023-07-18 | End: 2023-07-18

## 2023-07-18 RX ORDER — SODIUM CHLORIDE 9 MG/ML
INJECTION, SOLUTION INTRAVENOUS CONTINUOUS
Status: DISCONTINUED | OUTPATIENT
Start: 2023-07-18 | End: 2023-07-18 | Stop reason: HOSPADM

## 2023-07-18 RX ORDER — SODIUM CHLORIDE 0.9 % (FLUSH) 0.9 %
5-40 SYRINGE (ML) INJECTION PRN
Status: CANCELLED | OUTPATIENT
Start: 2023-07-18

## 2023-07-18 RX ADMIN — CYCLOPENTOLATE HYDROCHLORIDE 1 DROP: 10 SOLUTION OPHTHALMIC at 07:50

## 2023-07-18 RX ADMIN — TROPICAMIDE 1 DROP: 10 SOLUTION/ DROPS OPHTHALMIC at 07:50

## 2023-07-18 RX ADMIN — PHENYLEPHRINE HYDROCHLORIDE 1 DROP: 25 SOLUTION/ DROPS OPHTHALMIC at 08:00

## 2023-07-18 RX ADMIN — SODIUM CHLORIDE: 9 INJECTION, SOLUTION INTRAVENOUS at 08:02

## 2023-07-18 RX ADMIN — MIDAZOLAM 1 MG: 1 INJECTION INTRAMUSCULAR; INTRAVENOUS at 08:31

## 2023-07-18 RX ADMIN — PHENYLEPHRINE HYDROCHLORIDE 1 DROP: 25 SOLUTION/ DROPS OPHTHALMIC at 08:10

## 2023-07-18 RX ADMIN — SODIUM CHLORIDE: 9 INJECTION, SOLUTION INTRAVENOUS at 08:32

## 2023-07-18 RX ADMIN — CYCLOPENTOLATE HYDROCHLORIDE 1 DROP: 10 SOLUTION OPHTHALMIC at 08:00

## 2023-07-18 RX ADMIN — TROPICAMIDE 1 DROP: 10 SOLUTION/ DROPS OPHTHALMIC at 08:10

## 2023-07-18 RX ADMIN — CYCLOPENTOLATE HYDROCHLORIDE 1 DROP: 10 SOLUTION OPHTHALMIC at 08:10

## 2023-07-18 RX ADMIN — PHENYLEPHRINE HYDROCHLORIDE 1 DROP: 25 SOLUTION/ DROPS OPHTHALMIC at 07:50

## 2023-07-18 RX ADMIN — FENTANYL CITRATE 50 MCG: 50 INJECTION INTRAMUSCULAR; INTRAVENOUS at 08:32

## 2023-07-18 RX ADMIN — TROPICAMIDE 1 DROP: 10 SOLUTION/ DROPS OPHTHALMIC at 08:00

## 2023-07-18 RX ADMIN — TETRACAINE HYDROCHLORIDE 1 DROP: 5 SOLUTION OPHTHALMIC at 07:50

## 2023-07-18 ASSESSMENT — PAIN SCALES - GENERAL
PAINLEVEL_OUTOF10: 0
PAINLEVEL_OUTOF10: 0

## 2023-07-18 ASSESSMENT — PAIN - FUNCTIONAL ASSESSMENT: PAIN_FUNCTIONAL_ASSESSMENT: 0-10

## 2023-07-18 NOTE — TELEPHONE ENCOUNTER
Medication:   Requested Prescriptions     Pending Prescriptions Disp Refills    rosuvastatin (CRESTOR) 5 MG tablet [Pharmacy Med Name: ROSUVASTATIN 5MG TABLETS] 30 tablet 0     Sig: TAKE 1 TABLET BY MOUTH DAILY(PLEASE UPDATE PCP IN 2 WEEKS ON MED)       Last Filled:  6/22/2023    Patient Phone Number: [phone] (home)     Last appt: 7/11/2023   Next appt: 8/31/2023

## 2023-07-18 NOTE — OP NOTE
[facility]    OPERATIVE NOTE    Preoperative Diagnosis: Cataract the right eye    Postoperative Diagnosis: Cataract the right eye    Procedure: Phacoemulsification with intraocular lens inplantation, the right eye    Surgeon: Fannie Velarde MD    Anesthesia: Local/MAC    Complications: none    Specimens: none    Implant: [id number] +17.5    EBL: <5 cc    Indications for procedure: The patient is a 77y.o. year old with decreased vision, glare and halos around lights, and trouble with activities of daily living. Examination revealed a visually significant cataract in the the right eye. Risks, benefits, and alternatives to surgery were discussed with the patient and the patient elected to proceed with phacoemulsification with lens implantation. The patient's IOL calculations and lens selection were reviewed and confirmed. After verification and marking of the proper eye in the preop holding area, several sets of dilating drops were then placed. Description of procedure: The patient was brought to the operating room in supine position where the eye was prepped and draped in standard sterile fashion using 5% betadine and a lid speculum placed. Topical tetracaine was used in addition to the preoperative anesthesia. A paracentesis incision was created through which epi-shugarcaine was injected for further anesthesia. Viscoelastic was then used to fill the anterior chamber. A 2.4mm keratome blade was used to create a triplanar temporal clear corneal incision. A cystotome and Utrata forceps was then used to fashion a continuous curvilinear capsulorrhexis. Hydrodissection with BSS was performed using a hydrodissection cannula. Phacoemulsification of the nucleus was carried out with a divide and conquer technique, and all remaining epinuclear and cortical material was removed. The eye was reformed using viscoelastic and a [id number] +17.5 intraocular lens  was implanted into the capsular bag.   All remaining

## 2023-07-18 NOTE — ANESTHESIA PRE PROCEDURE
results for input(s): POCGLU, POCNA, POCK, POCCL, POCBUN, POCHEMO, POCHCT in the last 72 hours. Coags: No results found for: PROTIME, INR, APTT    HCG (If Applicable): No results found for: PREGTESTUR, PREGSERUM, HCG, HCGQUANT     ABGs: No results found for: PHART, PO2ART, PCG9VGS, AVP3XRA, BEART, H1RVIXUL     Type & Screen (If Applicable):  No results found for: LABABO, LABRH    Drug/Infectious Status (If Applicable):  No results found for: HIV, HEPCAB    COVID-19 Screening (If Applicable): No results found for: COVID19        Anesthesia Evaluation  Patient summary reviewed no history of anesthetic complications:   Airway: Mallampati: III  TM distance: >3 FB   Neck ROM: full  Mouth opening: < 3 FB   Dental: normal exam         Pulmonary: breath sounds clear to auscultation  (+) asthma:                            Cardiovascular:  Exercise tolerance: good (>4 METS),   (+) hyperlipidemia    (-) past MI and CAD      Rhythm: regular  Rate: normal                 ROS comment: TTE 2021:  Conclusions      Summary   Normal LV size and wall motion. EF is    70%. Normal diastolic function. Slight prolapse of the posterior mitral valve leaflet with very mild   regurgitaton. Normal left atrial size. The right ventricle is normal in size and function. Neuro/Psych:   (+) neuromuscular disease:,             GI/Hepatic/Renal:   (+) hiatal hernia, GERD:,      (-) liver disease and no renal disease       Endo/Other: Negative Endo/Other ROS                    Abdominal:             Vascular: negative vascular ROS. Other Findings:           Anesthesia Plan      MAC     ASA 3     (73 yo with hx of HLD, GERD, hiatal hernia, mitral valve prolapse, asthma presents for phaco.    Discussed risks and benefits to sedation including nausea, vomiting, allergic reaction, headache, delayed cognitive recovery, stroke, heart attack, respiratory depression, and death which patient understood and agreed to proceed.    The patient

## 2023-07-18 NOTE — DISCHARGE INSTRUCTIONS
Post-Operative Instructions After Cataract Surgery  Pending sale to [facility]   Fannie Velarde M.D.    [phone]    right        Patient Name: Jason Dunn  : 1945  MRN: [id number]      Wear your protective shield at bedtime and nap time for 1 week to prevent accidentally rubbing or bumping your eye. The post-operative drops are VERY IMPORTANT. Use them as directed on the drop schedule given to you the day of surgery. Do not lift over 25 lbs for the first week. DO NOT RUB YOUR EYE. You may wash your hair 24 hours after surgery, but avoid getting water in your eye for the first 5 days. Use a dry wash cloth to protect water from getting in your eye while taking a shower. No eye makeup for 1 week. You may return to work anytime provided your job does not require heavy lifting or straining. If you work in a dino environment, please take one week off work after surgery. Administer the following eye drops post operatively TODAY:    Prednisolone (Pred Forte) - 3 times today   Ocuflox (Ofloxacin) -3 times today   Bromsite - 1 time today       CALL THE OFFICE IMMEDIATELY IF YOU EXPERIENCE ANY OF THE FOLLOWING                   [phone]  Veil or curtain coming across vision. Sudden decrease in vision. Shower of NEW floaters. Increase in pain. Flashes of light.

## 2023-07-18 NOTE — H&P
Loren Cox is a 77y.o. year old who presents for elective outpatient ophthalmic surgery with Aurea Ellis MD.  The patient complains of decreased vision, glare and halos around lights, and trouble with vision for activities of daily living. Past Medical History:   Diagnosis Date    Actinic keratosis     goes to derm annually    Arthritis     jaws    Cataract mild    Difficult intubation     related to small airway    Diverticulosis     Dry eye     Herpes simplex virus (HSV) infection 10/2/2012    Hiatal hernia     Hyperlipidemia     Macular degeneration     dr Luly Cortes    Mitral valve prolapse     diagnosed [facility] early 1990s. asymptomatic    Osteopenia     RAD (reactive airway disease)     treated by dr Shawn Schafer. Seasonal allergies     Small bowel obstruction ([address]) 04/2021    resolved       Past Surgical History:   Procedure Laterality Date    APPENDECTOMY  1948    COLONOSCOPY      x2    DILATION AND CURETTAGE OF UTERUS      1979    EYE SURGERY      to remove ptyergium    HYSTERECTOMY (CERVIX STATUS UNKNOWN)      complete    COCO AND BSO (CERVIX REMOVED)  1989    secondary to fibroids    TUBAL LIGATION  1979    UPPER GASTROINTESTINAL ENDOSCOPY N/A 6/30/2021    ENDOSCOPIC ULTRASOUND OF PANCREAS performed by Bebeto Trejo MD at [facility]  6/30/2021    EGD BIOPSY performed by Bebeto Trejo MD at [facility] meds:   Prior to Admission medications    Medication Sig Start Date End Date Taking? Authorizing Provider   fluticasone (FLONASE) 50 MCG/ACT nasal spray USE 2 SPRAYS IN EACH NOSTRIL DAILY 7/13/23   Genaro Ochoa DO   donepezil (ARICEPT) 5 MG tablet TAKE 1 TABLET BY MOUTH WITH BREAKFAST.  TAKE FOR 30 DAYS THEN REFILL WITH 10 MG ARICEPT 6/20/23   Historical Provider, MD   rosuvastatin (CRESTOR) 5 MG tablet Take 1 tablet by mouth daily Please update pcp in 2 weeks on med 6/22/23   Genaro Ochoa DO

## 2023-07-18 NOTE — DISCHARGE SUMMARY
Physician Discharge Summary     Patient ID:  Katerin Shipman  [id number]  64 y.o.  1945    Admit date: 7/18/2023    Discharge date and time: No discharge date for patient encounter. Admitting Physician: Maeve Avery MD     Discharge Physician: Doug Steele MD    Admission Diagnoses: Nuclear sclerotic cataract of right eye [H25.11]  Cataract [H26.9]    Discharge Diagnoses: Same    Admission Condition: good    Discharged Condition: good    Indication for Admission: Cataract surgery,right eye    Hospital Course: CEIOL OD    Consults: none    Significant Diagnostic Studies: none    Treatments: surgery: CEIOL OD    Discharge Exam:  none    Disposition: home    In process/preliminary results:  Outstanding Order Results       No orders found from 6/19/2023 to 7/19/2023. Patient Instructions:   Current Discharge Medication List        CONTINUE these medications which have NOT CHANGED    Details   fluticasone (FLONASE) 50 MCG/ACT nasal spray USE 2 SPRAYS IN EACH NOSTRIL DAILY  Qty: 16 g, Refills: 11    Comments: **Patient requests 90 days supply**      donepezil (ARICEPT) 5 MG tablet TAKE 1 TABLET BY MOUTH WITH BREAKFAST.  TAKE FOR 30 DAYS THEN REFILL WITH 10 MG ARICEPT      rosuvastatin (CRESTOR) 5 MG tablet Take 1 tablet by mouth daily Please update pcp in 2 weeks on med  Qty: 30 tablet, Refills: 0      montelukast (SINGULAIR) 10 MG tablet TAKE 1 TABLET BY MOUTH EVERY NIGHT  Qty: 90 tablet, Refills: 3    Associated Diagnoses: Reactive airway disease, moderate persistent, uncomplicated      clindamycin (CLEOCIN T) 1 % external solution APPLY TO THE AFFECTED AREA TWICE DAILY AS NEEDED FOR FLARES  Qty: 60 mL, Refills: 3      fluticasone (FLOVENT HFA) 110 MCG/ACT inhaler INHALE 2 PUFFS BY MOUTH TWICE DAILY  Qty: 36 g, Refills: 3    Associated Diagnoses: Reactive airway disease, moderate persistent, uncomplicated      azelastine (ASTELIN) 0.1 % nasal spray USE 2 SPRAYS IN EACH NOSTRIL TWICE DAILY  Qty: 3

## 2023-07-18 NOTE — ANESTHESIA POSTPROCEDURE EVALUATION
Department of Anesthesiology  Postprocedure Note    Patient: Mehreen De Los Santos  MRN: [id number]  YOB: 1945  Date of evaluation: 7/18/2023      Procedure Summary     Date: 07/18/23 Room / Location: [address] [place] [address]    Anesthesia Start: 0825 Anesthesia Stop: 4672    Procedure: PHACOEMULSIFICATION WITH INTRAOCULAR LENS IMPLANT - RIGHT EYE (Right: Eye) Diagnosis:       Nuclear sclerotic cataract of right eye      (Nuclear sclerotic cataract of right eye [H25.11])    Surgeons: Arsenio Gutierrez MD Responsible Provider: Nanci Nye MD    Anesthesia Type: MAC ASA Status: 3          Anesthesia Type: No value filed. Ton Phase I: Ton Score: 10    Ton Phase II: Ton Score: 10      Anesthesia Post Evaluation    Patient location during evaluation: PACU  Patient participation: complete - patient participated  Level of consciousness: awake  Airway patency: patent  Nausea & Vomiting: no nausea and no vomiting  Cardiovascular status: blood pressure returned to baseline  Respiratory status: acceptable  Hydration status: stable  Comments: Vital signs stable  OK to discharge from Stage I post anesthesia care.   Care transferred from Anesthesiology department on discharge from perioperative area   Multimodal analgesia pain management approach

## 2023-07-27 NOTE — PROGRESS NOTES
[address]  time___0815_________        Surgery time____0945________    Take the following medications with a sip of water: Follow your MD/Surgeons pre-procedure instructions regarding your medications     Do not eat or drink anything after 12:00 midnight prior to your surgery. This includes water chewing gum, mints and ice chips. You may brush your teeth and gargle the morning of your surgery, but do not swallow the water     Please see your family doctor/pediatrician for a history and physical and/or concerning medications. H&P 7/11/23 BERNARDO Yolanda De Leon    Bring any test results/reports from your physicians office. If you are under the care of a heart doctor or specialist doctor, please be aware that you may be asked to them for clearance    You may be asked to stop blood thinners such as Coumadin, Plavix, Fragmin, Lovenox, etc., or any anti-inflammatories such as:  Aspirin, Ibuprofen, Advil, Naproxen prior to your surgery. We also ask that you stop any OTC medications such as fish oil, vitamin E, glucosamine, garlic, Multivitamins, COQ 10, etc.    We ask that you do not smoke 24 hours prior to surgery  We ask that you do not  drink any alcoholic beverages 24 hours prior to surgery     You must make arrangements for a responsible adult to take you home after your surgery. For your safety you will not be allowed to leave alone or drive yourself home. Your surgery will be cancelled if you do not have a ride home. Also for your safety, it is strongly suggested that someone stay with you the first 24 hours after your surgery. A parent or legal guardian must accompany a child scheduled for surgery and plan to stay at the hospital until the child is discharged. Please do not bring other children with you. For your comfort, please wear simple loose fitting clothing to the hospital.  Please do not bring valuables.  Wear short sleeve button down shirt

## 2023-07-28 ENCOUNTER — TELEPHONE (OUTPATIENT)
Dept: FAMILY MEDICINE CLINIC | Age: 78
End: 2023-07-28

## 2023-07-28 NOTE — TELEPHONE ENCOUNTER
Recommend CoQ10 supplement. This is OTC. If symptoms do not resolve then we may need to switch cholesterol medications. She is on a low dose other wise I would decrease first.     Please document call and then close encounter.   thanks

## 2023-07-28 NOTE — TELEPHONE ENCOUNTER
Called and informed pt regarding below. Pt states an understanding and will try out the CoQ10 supplement.

## 2023-07-28 NOTE — TELEPHONE ENCOUNTER
Problem with   rosuvastatin (CRESTOR) 5 MG tablet     Pt said that later in the day her legs feel weak and when she goes to bed at night the lower part of her legs hurt.  Wanted to touch base with Dr ADEN on what to do next

## 2023-07-31 ENCOUNTER — ANESTHESIA EVENT (OUTPATIENT)
Dept: SURGERY | Age: 78
End: 2023-07-31
Payer: MEDICARE

## 2023-07-31 ENCOUNTER — TELEPHONE (OUTPATIENT)
Dept: FAMILY MEDICINE CLINIC | Age: 78
End: 2023-07-31

## 2023-07-31 NOTE — ANESTHESIA PRE PROCEDURE
Department of Anesthesiology  Preprocedure Note       Name:  Ezzie Aschoff   Age:  77 y.o.  :  1945                                          MRN:  [id number]         Date:  2023      Surgeon: Watson Tyler):  Aurea Ellis MD    Procedure: Procedure(s):  PHACOEMULSIFICATION WITH INTRAOCULAR LENS IMPLANT - LEFT EYE    Medications prior to admission:   Prior to Admission medications    Medication Sig Start Date End Date Taking? Authorizing Provider   rosuvastatin (CRESTOR) 5 MG tablet TAKE 1 TABLET BY MOUTH DAILY(PLEASE UPDATE PCP IN 2 WEEKS ON MED) 23   Genaro Stain DO Gabriela   fluticasone (FLONASE) 50 MCG/ACT nasal spray USE 2 SPRAYS IN EACH NOSTRIL DAILY 23   Genaro Stain Gabriela DO   donepezil (ARICEPT) 5 MG tablet TAKE 1 TABLET BY MOUTH WITH BREAKFAST.  TAKE FOR 30 DAYS THEN REFILL WITH 10 MG ARICEPT 23   Historical Provider, MD   montelukast (SINGULAIR) 10 MG tablet TAKE 1 TABLET BY MOUTH EVERY NIGHT 23   Kleber Oakes, DO   clindamycin (CLEOCIN T) 1 % external solution APPLY TO THE AFFECTED AREA TWICE DAILY AS NEEDED FOR FLARES 23   Joanna Ojeda MD   fluticasone (FLOVENT HFA) 110 MCG/ACT inhaler INHALE 2 PUFFS BY MOUTH TWICE DAILY 3/15/23   Kleber Oakes, DO   azelastine (ASTELIN) 0.1 % nasal spray USE 2 SPRAYS IN EACH NOSTRIL TWICE DAILY 3/10/23   Kleber Oakes, DO   triamcinolone (KENALOG) 0.1 % cream APPLY TOPICALLY TO THE AFFECTED AREA TWICE DAILY AS NEEDED FOR FLARES 22   Joanna Ojeda MD   albuterol sulfate HFA (PROAIR HFA) 108 (90 Base) MCG/ACT inhaler Inhale 2 puffs into the lungs every 4 hours as needed for Wheezing or Shortness of Breath 22   Kleber Oakes DO   acetaminophen (TYLENOL) 500 MG tablet Take 1 tablet by mouth every 4 hours as needed for Pain 21   Genaro Ana Ochoa DO   pantoprazole (PROTONIX) 40 MG tablet Take 1 tablet by mouth every morning (before breakfast) 21

## 2023-07-31 NOTE — PERIOP NOTE
[address] [place] [address]        Pre-Op Phone Call:     Patient Name: Candis Mitchell     Telephone Information:   Mobile [phone]     Home phone:  [phone]    Surgery Time:    9:45 AM     Arrival Time:  8:15     Left extended Message:  Yes     Message left with: Spoke with patient      Recent change in health status:  No     Advised of transportation/ policy:  Yes     NPO policy reviewed: Yes     Advised to take morning heart/blood pressure medications with sips of water morning of surgery? Yes     Instructed to bring eye drops, photo identification, and insurance card day of surgery? Yes     Advised to wear short sleeved button down shirt (no T-shirt underneath):  Yes     Advised not to wear jewelry, hairpins, or pantyhose day of surgery? Yes     Advised not to wear make-up and to wash face day of surgery?   Yes    Remarks: Spoke with patient         Electronically signed by:  Chance Chatterjee RN at 7/31/2023 11:28 AM

## 2023-07-31 NOTE — TELEPHONE ENCOUNTER
Pt called stating that when she stopped taking rosuvastatin (CRESTOR) 5 MG tablet the pain in her legs went away a day after she stopped taking it.  Wanted to leave message for Dr ADEN  Pt is reachable at [phone]

## 2023-08-01 ENCOUNTER — HOSPITAL ENCOUNTER (OUTPATIENT)
Age: 78
Discharge: HOME | End: 2023-08-01
Attending: STUDENT IN AN ORGANIZED HEALTH CARE EDUCATION/TRAINING PROGRAM | Admitting: STUDENT IN AN ORGANIZED HEALTH CARE EDUCATION/TRAINING PROGRAM
Payer: MEDICARE

## 2023-08-01 ENCOUNTER — ANESTHESIA (OUTPATIENT)
Dept: SURGERY | Age: 78
End: 2023-08-01
Payer: MEDICARE

## 2023-08-01 VITALS
TEMPERATURE: 97 F | SYSTOLIC BLOOD PRESSURE: 104 MMHG | HEART RATE: 64 BPM | BODY MASS INDEX: 21.34 KG/M2 | DIASTOLIC BLOOD PRESSURE: 64 MMHG | RESPIRATION RATE: 20 BRPM | WEIGHT: 125 LBS | OXYGEN SATURATION: 98 % | HEIGHT: 64 IN

## 2023-08-01 PROBLEM — H26.9 CATARACT: Status: RESOLVED | Noted: 2023-08-01 | Resolved: 2023-08-01

## 2023-08-01 PROBLEM — H26.9 CATARACT: Status: ACTIVE | Noted: 2023-08-01

## 2023-08-01 PROCEDURE — 2500000003 HC RX 250 WO HCPCS: Performed by: STUDENT IN AN ORGANIZED HEALTH CARE EDUCATION/TRAINING PROGRAM

## 2023-08-01 PROCEDURE — 3700000001 HC ADD 15 MINUTES (ANESTHESIA): Performed by: STUDENT IN AN ORGANIZED HEALTH CARE EDUCATION/TRAINING PROGRAM

## 2023-08-01 PROCEDURE — 6360000002 HC RX W HCPCS

## 2023-08-01 PROCEDURE — 7100000010 HC PHASE II RECOVERY - FIRST 15 MIN: Performed by: STUDENT IN AN ORGANIZED HEALTH CARE EDUCATION/TRAINING PROGRAM

## 2023-08-01 PROCEDURE — 6370000000 HC RX 637 (ALT 250 FOR IP): Performed by: STUDENT IN AN ORGANIZED HEALTH CARE EDUCATION/TRAINING PROGRAM

## 2023-08-01 PROCEDURE — 2580000003 HC RX 258: Performed by: STUDENT IN AN ORGANIZED HEALTH CARE EDUCATION/TRAINING PROGRAM

## 2023-08-01 PROCEDURE — 6360000002 HC RX W HCPCS: Performed by: STUDENT IN AN ORGANIZED HEALTH CARE EDUCATION/TRAINING PROGRAM

## 2023-08-01 PROCEDURE — 3700000000 HC ANESTHESIA ATTENDED CARE: Performed by: STUDENT IN AN ORGANIZED HEALTH CARE EDUCATION/TRAINING PROGRAM

## 2023-08-01 PROCEDURE — 3600000004 HC SURGERY LEVEL 4 BASE: Performed by: STUDENT IN AN ORGANIZED HEALTH CARE EDUCATION/TRAINING PROGRAM

## 2023-08-01 PROCEDURE — 3600000014 HC SURGERY LEVEL 4 ADDTL 15MIN: Performed by: STUDENT IN AN ORGANIZED HEALTH CARE EDUCATION/TRAINING PROGRAM

## 2023-08-01 PROCEDURE — 2709999900 HC NON-CHARGEABLE SUPPLY: Performed by: STUDENT IN AN ORGANIZED HEALTH CARE EDUCATION/TRAINING PROGRAM

## 2023-08-01 PROCEDURE — V2632 POST CHMBR INTRAOCULAR LENS: HCPCS | Performed by: STUDENT IN AN ORGANIZED HEALTH CARE EDUCATION/TRAINING PROGRAM

## 2023-08-01 DEVICE — LENS CLAREON IOL 16.5D: Type: IMPLANTABLE DEVICE | Site: ANTERIOR CHAMBER | Status: FUNCTIONAL

## 2023-08-01 RX ORDER — SODIUM CHLORIDE 9 MG/ML
INJECTION, SOLUTION INTRAVENOUS PRN
Status: CANCELLED | OUTPATIENT
Start: 2023-08-01

## 2023-08-01 RX ORDER — MIDAZOLAM HYDROCHLORIDE 1 MG/ML
INJECTION INTRAMUSCULAR; INTRAVENOUS PRN
Status: DISCONTINUED | OUTPATIENT
Start: 2023-08-01 | End: 2023-08-01 | Stop reason: SDUPTHER

## 2023-08-01 RX ORDER — SODIUM CHLORIDE 9 MG/ML
INJECTION, SOLUTION INTRAVENOUS CONTINUOUS
Status: DISCONTINUED | OUTPATIENT
Start: 2023-08-01 | End: 2023-08-01 | Stop reason: HOSPADM

## 2023-08-01 RX ORDER — SODIUM CHLORIDE 0.9 % (FLUSH) 0.9 %
5-40 SYRINGE (ML) INJECTION PRN
Status: CANCELLED | OUTPATIENT
Start: 2023-08-01

## 2023-08-01 RX ORDER — SODIUM CHLORIDE 9 MG/ML
INJECTION, SOLUTION INTRAVENOUS PRN
Status: DISCONTINUED | OUTPATIENT
Start: 2023-08-01 | End: 2023-08-01 | Stop reason: HOSPADM

## 2023-08-01 RX ORDER — PREDNISOLONE ACETATE 10 MG/ML
SUSPENSION/ DROPS OPHTHALMIC
Status: COMPLETED | OUTPATIENT
Start: 2023-08-01 | End: 2023-08-01

## 2023-08-01 RX ORDER — TETRACAINE HYDROCHLORIDE 5 MG/ML
SOLUTION OPHTHALMIC
Status: COMPLETED | OUTPATIENT
Start: 2023-08-01 | End: 2023-08-01

## 2023-08-01 RX ORDER — FAMOTIDINE 10 MG/ML
20 INJECTION, SOLUTION INTRAVENOUS
Status: COMPLETED | OUTPATIENT
Start: 2023-08-01 | End: 2023-08-01

## 2023-08-01 RX ORDER — TETRACAINE HYDROCHLORIDE 5 MG/ML
1 SOLUTION OPHTHALMIC SEE ADMIN INSTRUCTIONS
Status: COMPLETED | OUTPATIENT
Start: 2023-08-01 | End: 2023-08-01

## 2023-08-01 RX ORDER — FENTANYL CITRATE 50 UG/ML
INJECTION, SOLUTION INTRAMUSCULAR; INTRAVENOUS PRN
Status: DISCONTINUED | OUTPATIENT
Start: 2023-08-01 | End: 2023-08-01 | Stop reason: SDUPTHER

## 2023-08-01 RX ORDER — SODIUM CHLORIDE 0.9 % (FLUSH) 0.9 %
5-40 SYRINGE (ML) INJECTION PRN
Status: DISCONTINUED | OUTPATIENT
Start: 2023-08-01 | End: 2023-08-01 | Stop reason: HOSPADM

## 2023-08-01 RX ORDER — SODIUM CHLORIDE 0.9 % (FLUSH) 0.9 %
5-40 SYRINGE (ML) INJECTION EVERY 12 HOURS SCHEDULED
Status: DISCONTINUED | OUTPATIENT
Start: 2023-08-01 | End: 2023-08-01 | Stop reason: HOSPADM

## 2023-08-01 RX ORDER — OFLOXACIN 3 MG/ML
SOLUTION/ DROPS OPHTHALMIC
Status: COMPLETED | OUTPATIENT
Start: 2023-08-01 | End: 2023-08-01

## 2023-08-01 RX ORDER — KETOROLAC TROMETHAMINE 5 MG/ML
SOLUTION OPHTHALMIC
Status: COMPLETED | OUTPATIENT
Start: 2023-08-01 | End: 2023-08-01

## 2023-08-01 RX ORDER — ONDANSETRON 2 MG/ML
4 INJECTION INTRAMUSCULAR; INTRAVENOUS
Status: COMPLETED | OUTPATIENT
Start: 2023-08-01 | End: 2023-08-01

## 2023-08-01 RX ORDER — PHENYLEPHRINE HCL 2.5 %
1 DROPS OPHTHALMIC (EYE) SEE ADMIN INSTRUCTIONS
Status: COMPLETED | OUTPATIENT
Start: 2023-08-01 | End: 2023-08-01

## 2023-08-01 RX ORDER — CYCLOPENTOLATE HYDROCHLORIDE 10 MG/ML
1 SOLUTION/ DROPS OPHTHALMIC SEE ADMIN INSTRUCTIONS
Status: COMPLETED | OUTPATIENT
Start: 2023-08-01 | End: 2023-08-01

## 2023-08-01 RX ORDER — BALANCED SALT SOLUTION 6.4; .75; .48; .3; 3.9; 1.7 MG/ML; MG/ML; MG/ML; MG/ML; MG/ML; MG/ML
SOLUTION OPHTHALMIC
Status: COMPLETED | OUTPATIENT
Start: 2023-08-01 | End: 2023-08-01

## 2023-08-01 RX ORDER — TROPICAMIDE 10 MG/ML
1 SOLUTION/ DROPS OPHTHALMIC SEE ADMIN INSTRUCTIONS
Status: COMPLETED | OUTPATIENT
Start: 2023-08-01 | End: 2023-08-01

## 2023-08-01 RX ORDER — SODIUM CHLORIDE 0.9 % (FLUSH) 0.9 %
5-40 SYRINGE (ML) INJECTION EVERY 12 HOURS SCHEDULED
Status: CANCELLED | OUTPATIENT
Start: 2023-08-01

## 2023-08-01 RX ADMIN — MIDAZOLAM 1 MG: 1 INJECTION INTRAMUSCULAR; INTRAVENOUS at 09:51

## 2023-08-01 RX ADMIN — CYCLOPENTOLATE HYDROCHLORIDE 1 DROP: 10 SOLUTION OPHTHALMIC at 09:00

## 2023-08-01 RX ADMIN — FAMOTIDINE 20 MG: 10 INJECTION INTRAVENOUS at 09:08

## 2023-08-01 RX ADMIN — SODIUM CHLORIDE: 9 INJECTION, SOLUTION INTRAVENOUS at 09:07

## 2023-08-01 RX ADMIN — CYCLOPENTOLATE HYDROCHLORIDE 1 DROP: 10 SOLUTION OPHTHALMIC at 08:55

## 2023-08-01 RX ADMIN — TROPICAMIDE 1 DROP: 10 SOLUTION/ DROPS OPHTHALMIC at 09:00

## 2023-08-01 RX ADMIN — TETRACAINE HYDROCHLORIDE 1 DROP: 5 SOLUTION OPHTHALMIC at 08:55

## 2023-08-01 RX ADMIN — PHENYLEPHRINE HYDROCHLORIDE 1 DROP: 25 SOLUTION/ DROPS OPHTHALMIC at 09:06

## 2023-08-01 RX ADMIN — TETRACAINE HYDROCHLORIDE 1 DROP: 5 SOLUTION OPHTHALMIC at 09:06

## 2023-08-01 RX ADMIN — TROPICAMIDE 1 DROP: 10 SOLUTION/ DROPS OPHTHALMIC at 08:55

## 2023-08-01 RX ADMIN — ONDANSETRON 4 MG: 2 INJECTION INTRAMUSCULAR; INTRAVENOUS at 09:08

## 2023-08-01 RX ADMIN — PHENYLEPHRINE HYDROCHLORIDE 1 DROP: 25 SOLUTION/ DROPS OPHTHALMIC at 08:55

## 2023-08-01 RX ADMIN — PHENYLEPHRINE HYDROCHLORIDE 1 DROP: 25 SOLUTION/ DROPS OPHTHALMIC at 09:00

## 2023-08-01 RX ADMIN — CYCLOPENTOLATE HYDROCHLORIDE 1 DROP: 10 SOLUTION OPHTHALMIC at 09:06

## 2023-08-01 RX ADMIN — TETRACAINE HYDROCHLORIDE 1 DROP: 5 SOLUTION OPHTHALMIC at 09:00

## 2023-08-01 RX ADMIN — MIDAZOLAM 1 MG: 1 INJECTION INTRAMUSCULAR; INTRAVENOUS at 09:54

## 2023-08-01 RX ADMIN — FENTANYL CITRATE 25 MCG: 50 INJECTION INTRAMUSCULAR; INTRAVENOUS at 10:18

## 2023-08-01 RX ADMIN — TROPICAMIDE 1 DROP: 10 SOLUTION/ DROPS OPHTHALMIC at 09:06

## 2023-08-01 ASSESSMENT — PAIN - FUNCTIONAL ASSESSMENT: PAIN_FUNCTIONAL_ASSESSMENT: 0-10

## 2023-08-01 ASSESSMENT — PAIN SCALES - GENERAL
PAINLEVEL_OUTOF10: 0
PAINLEVEL_OUTOF10: 0

## 2023-08-01 ASSESSMENT — LIFESTYLE VARIABLES: SMOKING_STATUS: 0

## 2023-08-01 NOTE — DISCHARGE SUMMARY
Physician Discharge Summary     Patient ID:  China Campbell  [id number]  34 y.o.  1945    Admit date: 8/1/2023    Discharge date and time: No discharge date for patient encounter. Admitting Physician: Alyx Pastor MD     Discharge Physician: Jaclynn Closs, MD    Admission Diagnoses: Nuclear sclerotic cataract of left eye [H25.12]  Cataract [H26.9]    Discharge Diagnoses: Same    Admission Condition: good    Discharged Condition: good    Indication for Admission: Cataract surgery, left eye    Hospital Course: [address]: none    Significant Diagnostic Studies: none    Treatments: surgery: CEIOL OS    Discharge Exam:  stable    Disposition: home    In process/preliminary results:  Outstanding Order Results       No orders found from 7/3/2023 to 8/2/2023. Patient Instructions:   Current Discharge Medication List        CONTINUE these medications which have NOT CHANGED    Details   rosuvastatin (CRESTOR) 5 MG tablet TAKE 1 TABLET BY MOUTH DAILY(PLEASE UPDATE PCP IN 2 WEEKS ON MED)  Qty: 90 tablet, Refills: 3      fluticasone (FLONASE) 50 MCG/ACT nasal spray USE 2 SPRAYS IN EACH NOSTRIL DAILY  Qty: 16 g, Refills: 11    Comments: **Patient requests 90 days supply**      donepezil (ARICEPT) 5 MG tablet TAKE 1 TABLET BY MOUTH WITH BREAKFAST.  TAKE FOR 30 DAYS THEN REFILL WITH 10 MG ARICEPT      montelukast (SINGULAIR) 10 MG tablet TAKE 1 TABLET BY MOUTH EVERY NIGHT  Qty: 90 tablet, Refills: 3    Associated Diagnoses: Reactive airway disease, moderate persistent, uncomplicated      clindamycin (CLEOCIN T) 1 % external solution APPLY TO THE AFFECTED AREA TWICE DAILY AS NEEDED FOR FLARES  Qty: 60 mL, Refills: 3      fluticasone (FLOVENT HFA) 110 MCG/ACT inhaler INHALE 2 PUFFS BY MOUTH TWICE DAILY  Qty: 36 g, Refills: 3    Associated Diagnoses: Reactive airway disease, moderate persistent, uncomplicated      azelastine (ASTELIN) 0.1 % nasal spray USE 2 SPRAYS IN EACH NOSTRIL TWICE DAILY  Qty: 3

## 2023-08-01 NOTE — H&P
Early Mention is a 77y.o. year old who presents for elective outpatient ophthalmic surgery with Rossy Contreras MD.  The patient complains of decreased vision, glare and halos around lights, and trouble with vision for activities of daily living. Past Medical History:   Diagnosis Date    Actinic keratosis     goes to derm annually    Arthritis     jaws    Asthma     Cataract mild    Difficult intubation     related to small airway    Diverticulosis     Dry eye     Herpes simplex virus (HSV) infection 10/02/2012    Hiatal hernia     Hyperlipidemia     Macular degeneration     dr Massey Patient    Mitral valve prolapse     diagnosed [facility] early 1990s. asymptomatic    Osteopenia     RAD (reactive airway disease)     treated by dr Maya Leavitt. Seasonal allergies     Small bowel obstruction ([address]) 04/2021    resolved       Past Surgical History:   Procedure Laterality Date    APPENDECTOMY  1948    COLONOSCOPY      x2    DILATION AND CURETTAGE OF UTERUS      1979    EYE SURGERY      to remove ptyergium    EYE SURGERY Right 7/18/2023    PHACOEMULSIFICATION WITH INTRAOCULAR LENS IMPLANT - RIGHT EYE performed by Rossy Contreras MD at [address] ([address])      complete    COCO AND BSO (CERVIX REMOVED)  1989    secondary to fibroids    TUBAL LIGATION  1979    UPPER GASTROINTESTINAL ENDOSCOPY N/A 6/30/2021    ENDOSCOPIC ULTRASOUND OF PANCREAS performed by Arina Vanessa MD at [facility]  6/30/2021    EGD BIOPSY performed by Arina Vanessa MD at [facility] meds:   Prior to Admission medications    Medication Sig Start Date End Date Taking?  Authorizing Provider   rosuvastatin (CRESTOR) 5 MG tablet TAKE 1 TABLET BY MOUTH DAILY(PLEASE UPDATE PCP IN 2 WEEKS ON MED) 7/18/23   Hesham Ochoa DO   fluticasone (FLONASE) 50 MCG/ACT nasal spray USE 2 SPRAYS IN [facility] NOSTRIL DAILY 7/13/23   Hesham Valentine

## 2023-08-01 NOTE — DISCHARGE INSTRUCTIONS
Post-Operative Instructions After Cataract Surgery  Critical access hospital   Heather Vale M.D.        left    @SR@    Patient Name: Lia Mackenzie  : 1945  MRN: [id number]      Wear your protective shield for the first 24 hours and then at bedtime and nap time for 1 week to prevent accidentally rubbing or bumping your eye. The post-operative drops are VERY IMPORTANT. Use them as directed on the drop schedule given to you the day of surgery. Do not lift over 25 lbs for the first week. DO NOT RUB YOUR EYE. You may wash your hair 24 hours after surgery, but avoid getting water in your eye for the first week. Use a dry wash cloth to protect water from getting in your eye while taking a shower. No eye makeup for 1 week. You may return to work anytime provided your job does not require heavy lifting or straining. If you work in a dino environment, please take one week off work after surgery. CALL THE OFFICE IMMEDIATELY IF YOU EXPERIENCE ANY OF THE FOLLOWING                     Veil or curtain coming across vision. Sudden decrease in vision. Shower of NEW floaters. Increase in pain. Flashes of light.

## 2023-08-01 NOTE — OP NOTE
[facility]    OPERATIVE NOTE    Preoperative Diagnosis: Cataract the left eye    Postoperative Diagnosis: Cataract the left eye    Procedure: Phacoemulsification with intraocular lens inplantation, the left eye    Surgeon: Brigette Rivero MD    Anesthesia: Local/MAC    Complications: none    Specimens: none    Implant: [id number] +16.5    EBL: <5 cc    Indications for procedure: The patient is a 77y.o. year old with decreased vision, glare and halos around lights, and trouble with activities of daily living. Examination revealed a visually significant cataract in the the left eye. Risks, benefits, and alternatives to surgery were discussed with the patient and the patient elected to proceed with phacoemulsification with lens implantation. The patient's IOL calculations and lens selection were reviewed and confirmed. After verification and marking of the proper eye in the preop holding area, several sets of dilating drops were then placed. Description of procedure: The patient was brought to the operating room in supine position where the eye was prepped and draped in standard sterile fashion using 5% betadine and a lid speculum placed. Topical tetracaine was used in addition to the preoperative anesthesia. A paracentesis incision was created through which epi-shugarcaine was injected for further anesthesia. Viscoelastic was then used to fill the anterior chamber. A 2.4mm keratome blade was used to create a triplanar temporal clear corneal incision. A cystotome and Utrata forceps was then used to fashion a continuous curvilinear capsulorrhexis. Hydrodissection with BSS was performed using a hydrodissection cannula. Phacoemulsification of the nucleus was carried out with a divide and conquer technique, and all remaining epinuclear and cortical material was removed. The eye was reformed using viscoelastic and a [id number] +16.5 intraocular lens  was implanted into the capsular bag. .  All remaining

## 2023-08-01 NOTE — ANESTHESIA POSTPROCEDURE EVALUATION
Department of Anesthesiology  Postprocedure Note    Patient: Oli Pappas  MRN: [id number]  YOB: 1945  Date of evaluation: 8/1/2023      Procedure Summary     Date: 08/01/23 Room / Location: [address]    Anesthesia Start: 1621 Anesthesia Stop: 1026    Procedure: PHACOEMULSIFICATION WITH INTRAOCULAR LENS IMPLANT - LEFT EYE (Left: Eye) Diagnosis:       Nuclear sclerotic cataract of left eye      (Nuclear sclerotic cataract of left eye [H25.12])    Surgeons: Mima Gerardo MD Responsible Provider: Elvis David MD    Anesthesia Type: MAC ASA Status: 3          Anesthesia Type: MAC    Ton Phase I: Ton Score: 10    Ton Phase II: Ton Score: 10      Anesthesia Post Evaluation    Patient location during evaluation: PACU  Patient participation: complete - patient participated  Level of consciousness: awake and alert  Airway patency: patent  Nausea & Vomiting: no nausea and no vomiting  Complications: no  Cardiovascular status: hemodynamically stable and blood pressure returned to baseline  Respiratory status: spontaneous ventilation, nonlabored ventilation and room air  Hydration status: stable  Comments: Uneventful MAC anesthetic. Ms. Christiano Pineda was seen resting comfortably following her procedure. Appropriate for discharge home with . No acute concerns.   Pain management: adequate and satisfactory to patient

## 2023-08-08 ENCOUNTER — OFFICE VISIT (OUTPATIENT)
Dept: PULMONOLOGY | Age: 78
End: 2023-08-08
Payer: MEDICARE

## 2023-08-08 VITALS
HEART RATE: 66 BPM | BODY MASS INDEX: 20.96 KG/M2 | SYSTOLIC BLOOD PRESSURE: 110 MMHG | TEMPERATURE: 97.8 F | OXYGEN SATURATION: 94 % | DIASTOLIC BLOOD PRESSURE: 60 MMHG | HEIGHT: 65 IN | RESPIRATION RATE: 18 BRPM | WEIGHT: 125.8 LBS

## 2023-08-08 DIAGNOSIS — J45.40 REACTIVE AIRWAY DISEASE, MODERATE PERSISTENT, UNCOMPLICATED: Primary | ICD-10-CM

## 2023-08-08 DIAGNOSIS — J30.89 SEASONAL ALLERGIC RHINITIS DUE TO OTHER ALLERGIC TRIGGER: ICD-10-CM

## 2023-08-08 PROCEDURE — 1090F PRES/ABSN URINE INCON ASSESS: CPT | Performed by: INTERNAL MEDICINE

## 2023-08-08 PROCEDURE — 99213 OFFICE O/P EST LOW 20 MIN: CPT | Performed by: INTERNAL MEDICINE

## 2023-08-08 PROCEDURE — G8399 PT W/DXA RESULTS DOCUMENT: HCPCS | Performed by: INTERNAL MEDICINE

## 2023-08-08 PROCEDURE — G8420 CALC BMI NORM PARAMETERS: HCPCS | Performed by: INTERNAL MEDICINE

## 2023-08-08 PROCEDURE — 1123F ACP DISCUSS/DSCN MKR DOCD: CPT | Performed by: INTERNAL MEDICINE

## 2023-08-08 PROCEDURE — G8427 DOCREV CUR MEDS BY ELIG CLIN: HCPCS | Performed by: INTERNAL MEDICINE

## 2023-08-08 PROCEDURE — 1036F TOBACCO NON-USER: CPT | Performed by: INTERNAL MEDICINE

## 2023-08-08 ASSESSMENT — ASTHMA QUESTIONNAIRES
QUESTION_2 LAST FOUR WEEKS HOW OFTEN HAVE YOU HAD SHORTNESS OF BREATH: 5
QUESTION_5 LAST FOUR WEEKS HOW WOULD YOU RATE YOUR ASTHMA CONTROL: 5
QUESTION_3 LAST FOUR WEEKS HOW OFTEN DID YOUR ASTHMA SYMPTOMS (WHEEZING, COUGHING, SHORTNESS OF BREATH, CHEST TIGHTNESS OR PAIN) WAKE YOU UP AT NIGHT OR EARLIER THAN USUAL IN THE MORNING: 5
QUESTION_4 LAST FOUR WEEKS HOW OFTEN HAVE YOU USED YOUR RESCUE INHALER OR NEBULIZER MEDICATION (SUCH AS ALBUTEROL): 5
ACT_TOTALSCORE: 25
QUESTION_1 LAST FOUR WEEKS HOW MUCH OF THE TIME DID YOUR ASTHMA KEEP YOU FROM GETTING AS MUCH DONE AT WORK, SCHOOL OR AT HOME: 5

## 2023-08-15 ENCOUNTER — OFFICE VISIT (OUTPATIENT)
Dept: FAMILY MEDICINE CLINIC | Age: 78
End: 2023-08-15
Payer: MEDICARE

## 2023-08-15 VITALS
OXYGEN SATURATION: 100 % | HEART RATE: 61 BPM | WEIGHT: 124 LBS | SYSTOLIC BLOOD PRESSURE: 139 MMHG | DIASTOLIC BLOOD PRESSURE: 71 MMHG | HEIGHT: 65 IN | BODY MASS INDEX: 20.66 KG/M2

## 2023-08-15 DIAGNOSIS — R73.03 PREDIABETES: ICD-10-CM

## 2023-08-15 DIAGNOSIS — R63.4 WEIGHT LOSS: Primary | ICD-10-CM

## 2023-08-15 DIAGNOSIS — E78.49 OTHER HYPERLIPIDEMIA: ICD-10-CM

## 2023-08-15 LAB
ALBUMIN SERPL-MCNC: 4.4 G/DL (ref 3.4–5)
ALBUMIN/GLOB SERPL: 2 {RATIO} (ref 1.1–2.2)
ALP SERPL-CCNC: 120 U/L (ref 40–129)
ALT SERPL-CCNC: 20 U/L (ref 10–40)
ANION GAP SERPL CALCULATED.3IONS-SCNC: 13 MMOL/L (ref 3–16)
AST SERPL-CCNC: 25 U/L (ref 15–37)
BASOPHILS # BLD: 0 K/UL (ref 0–0.2)
BASOPHILS NFR BLD: 1 %
BILIRUB SERPL-MCNC: 0.3 MG/DL (ref 0–1)
BUN SERPL-MCNC: 11 MG/DL (ref 7–20)
CALCIUM SERPL-MCNC: 9.6 MG/DL (ref 8.3–10.6)
CHLORIDE SERPL-SCNC: 99 MMOL/L (ref 99–110)
CO2 SERPL-SCNC: 26 MMOL/L (ref 21–32)
CREAT SERPL-MCNC: <0.5 MG/DL (ref 0.6–1.2)
DEPRECATED RDW RBC AUTO: 13.3 % (ref 12.4–15.4)
EOSINOPHIL # BLD: 0.1 K/UL (ref 0–0.6)
EOSINOPHIL NFR BLD: 3.7 %
GFR SERPLBLD CREATININE-BSD FMLA CKD-EPI: >60 ML/MIN/{1.73_M2}
GLUCOSE SERPL-MCNC: 103 MG/DL (ref 70–99)
HCT VFR BLD AUTO: 37.9 % (ref 36–48)
HGB BLD-MCNC: 13.1 G/DL (ref 12–16)
LDLC SERPL-MCNC: 51 MG/DL
LYMPHOCYTES # BLD: 0.9 K/UL (ref 1–5.1)
LYMPHOCYTES NFR BLD: 23.5 %
MCH RBC QN AUTO: 29.4 PG (ref 26–34)
MCHC RBC AUTO-ENTMCNC: 34.5 G/DL (ref 31–36)
MCV RBC AUTO: 85.1 FL (ref 80–100)
MONOCYTES # BLD: 0.4 K/UL (ref 0–1.3)
MONOCYTES NFR BLD: 11.5 %
NEUTROPHILS # BLD: 2.2 K/UL (ref 1.7–7.7)
NEUTROPHILS NFR BLD: 60.3 %
PLATELET # BLD AUTO: 211 K/UL (ref 135–450)
PMV BLD AUTO: 8.1 FL (ref 5–10.5)
POTASSIUM SERPL-SCNC: 4.3 MMOL/L (ref 3.5–5.1)
PROT SERPL-MCNC: 6.6 G/DL (ref 6.4–8.2)
RBC # BLD AUTO: 4.45 M/UL (ref 4–5.2)
SODIUM SERPL-SCNC: 138 MMOL/L (ref 136–145)
T4 FREE SERPL-MCNC: 1.3 NG/DL (ref 0.9–1.8)
TSH SERPL DL<=0.005 MIU/L-ACNC: 1.13 UIU/ML (ref 0.27–4.2)
WBC # BLD AUTO: 3.7 K/UL (ref 4–11)

## 2023-08-15 PROCEDURE — 36415 COLL VENOUS BLD VENIPUNCTURE: CPT | Performed by: FAMILY MEDICINE

## 2023-08-15 PROCEDURE — 1123F ACP DISCUSS/DSCN MKR DOCD: CPT | Performed by: FAMILY MEDICINE

## 2023-08-15 PROCEDURE — G8427 DOCREV CUR MEDS BY ELIG CLIN: HCPCS | Performed by: FAMILY MEDICINE

## 2023-08-15 PROCEDURE — G8399 PT W/DXA RESULTS DOCUMENT: HCPCS | Performed by: FAMILY MEDICINE

## 2023-08-15 PROCEDURE — G8420 CALC BMI NORM PARAMETERS: HCPCS | Performed by: FAMILY MEDICINE

## 2023-08-15 PROCEDURE — 1090F PRES/ABSN URINE INCON ASSESS: CPT | Performed by: FAMILY MEDICINE

## 2023-08-15 PROCEDURE — 99214 OFFICE O/P EST MOD 30 MIN: CPT | Performed by: FAMILY MEDICINE

## 2023-08-15 PROCEDURE — 1036F TOBACCO NON-USER: CPT | Performed by: FAMILY MEDICINE

## 2023-08-15 RX ORDER — PRAVASTATIN SODIUM 20 MG
20 TABLET ORAL DAILY
Qty: 30 TABLET | Refills: 0 | Status: SHIPPED | OUTPATIENT
Start: 2023-08-15 | End: 2023-08-16 | Stop reason: SDUPTHER

## 2023-08-16 LAB
EST. AVERAGE GLUCOSE BLD GHB EST-MCNC: 119.8 MG/DL
HBA1C MFR BLD: 5.8 %

## 2023-08-16 RX ORDER — PRAVASTATIN SODIUM 20 MG
20 TABLET ORAL DAILY
Qty: 90 TABLET | Refills: 3 | Status: SHIPPED | OUTPATIENT
Start: 2023-08-16

## 2023-08-18 ENCOUNTER — PATIENT MESSAGE (OUTPATIENT)
Dept: FAMILY MEDICINE CLINIC | Age: 78
End: 2023-08-18

## 2023-08-18 ENCOUNTER — TELEPHONE (OUTPATIENT)
Dept: FAMILY MEDICINE CLINIC | Age: 78
End: 2023-08-18

## 2023-08-18 DIAGNOSIS — D72.819 LEUKOPENIA, UNSPECIFIED TYPE: Primary | ICD-10-CM

## 2023-08-18 NOTE — TELEPHONE ENCOUNTER
From: Kennedy Mcconnell  To: Dr. Raquel Aguillon  Sent: 8/18/2023 2:12 PM EDT  Subject: Medication    Want to update you on the Pravastatin that replaced the previous statin that was causing terrible, explosive diarrhea plus pain in my feet. No pain at all on the Pravastatin. Still like diarrhea but not nearly as bad as with the previous statin. Am eating better.

## 2023-08-18 NOTE — TELEPHONE ENCOUNTER
Called/spoke to pt, informed of DR. ADEN's results and recs. She is agreeable with referral. Please advise.

## 2023-08-18 NOTE — TELEPHONE ENCOUNTER
Please let patient know her bad cholesterol level looks fantastic, her fasting sugar and 3-month sugar average levels are a bit elevated. I do not her restricting her food choices at this point. We may need to change this in the future, but I would like her to eat more liberally at this time. Her thyroid levels are in the normal range. Her white blood cell count is a bit low. With her weight loss, I do feel it would be reasonable to have her see a blood specialist 1 time to make sure additional work-up is not recommended.   Would she be okay with this referral?

## 2023-08-18 NOTE — TELEPHONE ENCOUNTER
----- Message from Carlos Clemens sent at 8/18/2023  2:20 PM EDT -----  Regarding: FW: Medication  Contact: [phone]    ----- Message -----  From: Angelita Solorzano \"Gayle\"  Sent: 8/18/2023   2:12 PM EDT  To: [facility] Staff  Subject: Medication                                       Want to update you on the Pravastatin that replaced the previous statin that was causing terrible, explosive diarrhea plus pain in my feet. No pain at all on the Pravastatin. Still like diarrhea but not nearly as bad as with the previous statin. Am eating better.

## 2023-08-31 ENCOUNTER — TELEPHONE (OUTPATIENT)
Dept: FAMILY MEDICINE CLINIC | Age: 78
End: 2023-08-31

## 2023-08-31 DIAGNOSIS — Z12.31 ENCOUNTER FOR SCREENING MAMMOGRAM FOR MALIGNANT NEOPLASM OF BREAST: Primary | ICD-10-CM

## 2023-08-31 NOTE — TELEPHONE ENCOUNTER
Her next 1 will be due in mid December. Too soon to have 1 now, needs to be 12 months in between. Order placed for December. I will send this via [employer].

## 2023-08-31 NOTE — TELEPHONE ENCOUNTER
Pt called and stated that she was referred to another doctor who said that she needed a mammogram. Pt said that she needed Dr ADEN to order a mammogram for her.  Pt is reachable at [phone]

## 2023-09-08 ENCOUNTER — HOSPITAL ENCOUNTER (OUTPATIENT)
Dept: CT IMAGING | Age: 78
Discharge: HOME | End: 2023-09-08
Attending: INTERNAL MEDICINE
Payer: MEDICARE

## 2023-09-08 DIAGNOSIS — R63.4 ABNORMAL WEIGHT LOSS: ICD-10-CM

## 2023-09-08 PROCEDURE — 74177 CT ABD & PELVIS W/CONTRAST: CPT

## 2023-09-08 PROCEDURE — 6360000004 HC RX CONTRAST MEDICATION: Performed by: INTERNAL MEDICINE

## 2023-09-08 RX ADMIN — DIATRIZOATE MEGLUMINE AND DIATRIZOATE SODIUM 30 ML: 660; 100 LIQUID ORAL; RECTAL at 08:17

## 2023-09-08 RX ADMIN — IOMEPROL INJECTION 100 ML: 714 INJECTION, SOLUTION INTRAVASCULAR at 09:28

## 2023-09-18 ENCOUNTER — TELEPHONE (OUTPATIENT)
Dept: FAMILY MEDICINE CLINIC | Age: 78
End: 2023-09-18

## 2023-09-18 NOTE — TELEPHONE ENCOUNTER
Pt called stating she started having cold symptoms on Saturday and it was worse on Sunday so she took a covid test and it was positive. Pt is wanting to know if she can get paxlovid called in. Please advise.  Pt is reachable at [phone]

## 2023-09-18 NOTE — TELEPHONE ENCOUNTER
My drug program is showing that Paxlovid and Aricept together increase risk for heart rhythm issues. If neurology is okay with her stopping med while on Paxlovid, I would be okay with prescribing it. Also, she would need to not take statin while on Paxlovid. There could be some other interactions when med signed, but I am not seeing any others right now. The goal of medicine is to help prevent hospitalization and death. I have seen it helps symptoms before as well. Most common side effects I hear about on med are change in taste and diarrhea. There is a rare chance of liver toxicity.

## 2023-09-18 NOTE — TELEPHONE ENCOUNTER
Daughter calling back. Spoke with neurologist. Abbie Ulloa pt should take the paxlovid, and should take the aricept every other day.  Please call Paxlovid into [facility] on [address]

## 2023-09-18 NOTE — TELEPHONE ENCOUNTER
[employer] and discussed as well as her daughter Vladimir Franks, will call Neurology and notify if ok to prescribe.  If it is ok when she calls back please ask for pharmacy

## 2023-10-02 ENCOUNTER — TELEPHONE (OUTPATIENT)
Dept: FAMILY MEDICINE CLINIC | Age: 78
End: 2023-10-02

## 2023-10-02 NOTE — TELEPHONE ENCOUNTER
Pt called stating that she had gotten covid on the 18th and that she had a question about the vaccine. Pt said that she wanted to know if she should get the vaccine because she thought there was an immunity to covid after she had it. Pt said that she was not sure if she should get the vaccine.  Pt is reachable at [phone]

## 2023-11-08 ENCOUNTER — TELEPHONE (OUTPATIENT)
Dept: PULMONOLOGY | Age: 78
End: 2023-11-08

## 2023-11-08 ENCOUNTER — TELEPHONE (OUTPATIENT)
Dept: FAMILY MEDICINE CLINIC | Age: 78
End: 2023-11-08

## 2023-11-08 NOTE — TELEPHONE ENCOUNTER
Pt called regarding Singulair rx. She thinks that she should not be taking the medication because it states not to take if you have memory problems. Pt states she was diagnosed with Alzheimer's in June. Please follow up.

## 2023-11-10 NOTE — TELEPHONE ENCOUNTER
The patient has been notified of this information and all questions answered. Pt also stated she will continue Singulair rx for now unless her daughter has concerns in future.

## 2023-11-13 ENCOUNTER — OFFICE VISIT (OUTPATIENT)
Dept: DERMATOLOGY | Age: 78
End: 2023-11-13
Payer: MEDICARE

## 2023-11-13 ENCOUNTER — TELEPHONE (OUTPATIENT)
Dept: DERMATOLOGY | Age: 78
End: 2023-11-13

## 2023-11-13 DIAGNOSIS — L30.9 DERMATITIS: ICD-10-CM

## 2023-11-13 DIAGNOSIS — D22.9 MULTIPLE NEVI: Primary | ICD-10-CM

## 2023-11-13 DIAGNOSIS — L82.1 SEBORRHEIC KERATOSIS: ICD-10-CM

## 2023-11-13 DIAGNOSIS — L57.0 AK (ACTINIC KERATOSIS): ICD-10-CM

## 2023-11-13 DIAGNOSIS — L73.9 FOLLICULITIS: ICD-10-CM

## 2023-11-13 PROCEDURE — 17000 DESTRUCT PREMALG LESION: CPT | Performed by: DERMATOLOGY

## 2023-11-13 PROCEDURE — G8399 PT W/DXA RESULTS DOCUMENT: HCPCS | Performed by: DERMATOLOGY

## 2023-11-13 PROCEDURE — 17003 DESTRUCT PREMALG LES 2-14: CPT | Performed by: DERMATOLOGY

## 2023-11-13 PROCEDURE — 1123F ACP DISCUSS/DSCN MKR DOCD: CPT | Performed by: DERMATOLOGY

## 2023-11-13 PROCEDURE — G8420 CALC BMI NORM PARAMETERS: HCPCS | Performed by: DERMATOLOGY

## 2023-11-13 PROCEDURE — 1090F PRES/ABSN URINE INCON ASSESS: CPT | Performed by: DERMATOLOGY

## 2023-11-13 PROCEDURE — G8427 DOCREV CUR MEDS BY ELIG CLIN: HCPCS | Performed by: DERMATOLOGY

## 2023-11-13 PROCEDURE — 99214 OFFICE O/P EST MOD 30 MIN: CPT | Performed by: DERMATOLOGY

## 2023-11-13 PROCEDURE — 1036F TOBACCO NON-USER: CPT | Performed by: DERMATOLOGY

## 2023-11-13 PROCEDURE — G8484 FLU IMMUNIZE NO ADMIN: HCPCS | Performed by: DERMATOLOGY

## 2023-11-13 NOTE — TELEPHONE ENCOUNTER
Pt's daughter is in the lobby. She is not going back with her mother. Pt's daughter wants the doctor to know her mother has a new diagnosis. She has Alzheimer's Disease.

## 2023-11-13 NOTE — PROGRESS NOTES
Examination     Gen, well-appearing    FSE today except for underwear-cvoered areas    trunk and extremities with scattered brown macules and papules   Nose, FH and R cheek with erythematous roughly scaled macules  Scalp with few crusted small excoriations  Back with xerosis, minimal erythema                  Assessment and Plan     1. Benign-appearing nevi and few SK's  - educ re ABCD's of MM   educ sun protection SPF 30+  encouraged skin check yearly (sooner if indicated), self checks    2  AK's - nose, FH, R cheek  - 5 lesion(s) treated with liquid nitrogen with cryac or swab. Treated with 2 cycles for 1-5 seconds each after consent from patient. Patient educated on risk of blister, hypopigmentation/scar and wound care. Tolerated well. 3. Scalp Folliculitis and excoriations - stable/minimal today  - clinda lotion/solution (likes the vehicle better)  - consider oral abx if worsening    4.  *Itchy back - mild dermatitis - stable  - ed DSC, TAC cream bdi prn flares; ed se/misuse

## 2023-12-28 ENCOUNTER — TELEPHONE (OUTPATIENT)
Dept: FAMILY MEDICINE CLINIC | Age: 78
End: 2023-12-28

## 2023-12-28 PROBLEM — G30.1 LATE ONSET ALZHEIMER DISEASE (HCC): Status: ACTIVE | Noted: 2023-12-28

## 2023-12-28 PROBLEM — F02.80 LATE ONSET ALZHEIMER DISEASE (HCC): Status: ACTIVE | Noted: 2023-12-28

## 2023-12-28 NOTE — TELEPHONE ENCOUNTER
I received high-priority form from [facility] for speech therapy. Form filled out and will be put in urgent done folder for faxing.

## 2024-01-02 ENCOUNTER — TELEPHONE (OUTPATIENT)
Dept: FAMILY MEDICINE CLINIC | Age: 79
End: 2024-01-02

## 2024-03-13 ENCOUNTER — OFFICE VISIT (OUTPATIENT)
Dept: PULMONOLOGY | Age: 79
End: 2024-03-13
Payer: MEDICARE

## 2024-03-13 VITALS
WEIGHT: 122.6 LBS | TEMPERATURE: 97.1 F | SYSTOLIC BLOOD PRESSURE: 134 MMHG | BODY MASS INDEX: 20.43 KG/M2 | RESPIRATION RATE: 16 BRPM | HEIGHT: 65 IN | DIASTOLIC BLOOD PRESSURE: 70 MMHG | HEART RATE: 72 BPM | OXYGEN SATURATION: 98 %

## 2024-03-13 DIAGNOSIS — J45.40 REACTIVE AIRWAY DISEASE, MODERATE PERSISTENT, UNCOMPLICATED: ICD-10-CM

## 2024-03-13 DIAGNOSIS — J30.89 SEASONAL ALLERGIC RHINITIS DUE TO OTHER ALLERGIC TRIGGER: Primary | ICD-10-CM

## 2024-03-13 PROCEDURE — 99214 OFFICE O/P EST MOD 30 MIN: CPT | Performed by: INTERNAL MEDICINE

## 2024-03-13 PROCEDURE — G8427 DOCREV CUR MEDS BY ELIG CLIN: HCPCS | Performed by: INTERNAL MEDICINE

## 2024-03-13 PROCEDURE — G8399 PT W/DXA RESULTS DOCUMENT: HCPCS | Performed by: INTERNAL MEDICINE

## 2024-03-13 PROCEDURE — G8484 FLU IMMUNIZE NO ADMIN: HCPCS | Performed by: INTERNAL MEDICINE

## 2024-03-13 PROCEDURE — G8420 CALC BMI NORM PARAMETERS: HCPCS | Performed by: INTERNAL MEDICINE

## 2024-03-13 PROCEDURE — 1123F ACP DISCUSS/DSCN MKR DOCD: CPT | Performed by: INTERNAL MEDICINE

## 2024-03-13 PROCEDURE — 1036F TOBACCO NON-USER: CPT | Performed by: INTERNAL MEDICINE

## 2024-03-13 PROCEDURE — 1090F PRES/ABSN URINE INCON ASSESS: CPT | Performed by: INTERNAL MEDICINE

## 2024-03-13 RX ORDER — MONTELUKAST SODIUM 10 MG/1
10 TABLET ORAL NIGHTLY
Qty: 90 TABLET | Refills: 3 | Status: SHIPPED | OUTPATIENT
Start: 2024-03-13 | End: 2025-03-13

## 2024-03-13 RX ORDER — FLUTICASONE FUROATE 100 UG/1
1 POWDER RESPIRATORY (INHALATION) DAILY
Qty: 3 EACH | Refills: 3 | Status: SHIPPED | OUTPATIENT
Start: 2024-03-13

## 2024-03-13 RX ORDER — PRAVASTATIN SODIUM 10 MG
10 TABLET ORAL DAILY
COMMUNITY

## 2024-03-13 RX ORDER — DESVENLAFAXINE SUCCINATE 50 MG/1
50 TABLET, EXTENDED RELEASE ORAL DAILY
COMMUNITY
Start: 2024-02-21

## 2024-03-13 ASSESSMENT — ASTHMA QUESTIONNAIRES
QUESTION_1 LAST FOUR WEEKS HOW MUCH OF THE TIME DID YOUR ASTHMA KEEP YOU FROM GETTING AS MUCH DONE AT WORK, SCHOOL OR AT HOME: 5
QUESTION_5 LAST FOUR WEEKS HOW WOULD YOU RATE YOUR ASTHMA CONTROL: 5
ACT_TOTALSCORE: 25
QUESTION_2 LAST FOUR WEEKS HOW OFTEN HAVE YOU HAD SHORTNESS OF BREATH: 5
QUESTION_4 LAST FOUR WEEKS HOW OFTEN HAVE YOU USED YOUR RESCUE INHALER OR NEBULIZER MEDICATION (SUCH AS ALBUTEROL): 5
QUESTION_3 LAST FOUR WEEKS HOW OFTEN DID YOUR ASTHMA SYMPTOMS (WHEEZING, COUGHING, SHORTNESS OF BREATH, CHEST TIGHTNESS OR PAIN) WAKE YOU UP AT NIGHT OR EARLIER THAN USUAL IN THE MORNING: 5

## 2024-03-13 NOTE — PROGRESS NOTES
Chief complaint  This is a 78 y.o. year old female  who comes to see me with a chief complaint of   Chief Complaint   Patient presents with    Follow-up       HPI  Here with cc on follow up on asthma/RADs.    She is doing well.  Went on vacation and climbed hills etc.  Did not get SOB.  On flovent, singulair, flonase and singulair.  Her other daughter emailed me about stopping singulair regarding memory issues but she never did stop it.  She was diagnosed with early memory issues and there was a list of meds to avoid.  Singulair is not on it.  Flovent will be changing to arnuity per insurance. Doing well         Current Outpatient Medications   Medication Sig Dispense Refill    desvenlafaxine succinate (PRISTIQ) 50 MG TB24 extended release tablet Take 1 tablet by mouth daily      pravastatin (PRAVACHOL) 10 MG tablet Take 1 tablet by mouth daily      montelukast (SINGULAIR) 10 MG tablet Take 1 tablet by mouth nightly 90 tablet 3    fluticasone (ARNUITY ELLIPTA) 100 MCG/ACT AEPB Inhale 1 puff into the lungs daily 3 each 3    fluticasone (FLONASE) 50 MCG/ACT nasal spray USE 2 SPRAYS IN EACH NOSTRIL DAILY 16 g 11    donepezil (ARICEPT) 5 MG tablet TAKE 1 TABLET BY MOUTH WITH BREAKFAST. TAKE FOR 30 DAYS THEN REFILL WITH 10 MG ARICEPT      clindamycin (CLEOCIN T) 1 % external solution APPLY TO THE AFFECTED AREA TWICE DAILY AS NEEDED FOR FLARES 60 mL 3    azelastine (ASTELIN) 0.1 % nasal spray USE 2 SPRAYS IN EACH NOSTRIL TWICE DAILY 3 each 3    triamcinolone (KENALOG) 0.1 % cream APPLY TOPICALLY TO THE AFFECTED AREA TWICE DAILY AS NEEDED FOR FLARES 80 g 1    albuterol sulfate HFA (PROAIR HFA) 108 (90 Base) MCG/ACT inhaler Inhale 2 puffs into the lungs every 4 hours as needed for Wheezing or Shortness of Breath 18 g 5    acetaminophen (TYLENOL) 500 MG tablet Take 1 tablet by mouth every 4 hours as needed for Pain 360 tablet 1    pantoprazole (PROTONIX) 40 MG tablet Take 1 tablet by mouth every morning (before breakfast) 30

## 2024-03-13 NOTE — PATIENT INSTRUCTIONS
Change flovent to arnuity take once a day    Continue with montelukast and nasal sprays    Follow up in 6 months

## 2024-03-19 DIAGNOSIS — J45.40 REACTIVE AIRWAY DISEASE, MODERATE PERSISTENT, UNCOMPLICATED: ICD-10-CM

## 2024-03-19 RX ORDER — FLUTICASONE PROPIONATE 110 UG/1
AEROSOL, METERED RESPIRATORY (INHALATION)
Qty: 36 G | Refills: 3 | OUTPATIENT
Start: 2024-03-19

## 2024-03-19 NOTE — TELEPHONE ENCOUNTER
Called patient and explained that her insurance will no longer cover the Flovent Inhaler and the alternative is Arnuity which is a powder.

## 2024-03-19 NOTE — TELEPHONE ENCOUNTER
Gayle calls in stating that we changed her Flovent to powder. She can't inhale it. It sticks to her throat. She wants to change back to the non powder version that she can inhale. She wants the prescription sent in to [facility] [phone]  Thank you.

## 2024-03-19 NOTE — TELEPHONE ENCOUNTER
Last appt: 3/13/24  Next appt: 10/7/2024  Medication matches medication on Epic list : Flovent will be changing to arnuity per insurance . She want Flovent inhaler and not the powder.

## 2024-03-27 DIAGNOSIS — J45.40 REACTIVE AIRWAY DISEASE, MODERATE PERSISTENT, UNCOMPLICATED: ICD-10-CM

## 2024-03-27 RX ORDER — FLUTICASONE FUROATE 100 UG/1
1 POWDER RESPIRATORY (INHALATION) DAILY
Qty: 3 EACH | Refills: 3 | Status: SHIPPED | OUTPATIENT
Start: 2024-03-27

## 2024-03-27 NOTE — TELEPHONE ENCOUNTER
Medication:   Requested Prescriptions     Pending Prescriptions Disp Refills    fluticasone (ARNUITY ELLIPTA) 100 MCG/ACT AEPB 3 each 3     Sig: Inhale 1 puff into the lungs daily        Last Filled:  03/13/24    Patient Phone Number: [phone] (home)     Last appt: 8/15/2023   Next appt: Visit date not found    Last OARRS:        No data to display

## 2024-05-08 ENCOUNTER — TELEPHONE (OUTPATIENT)
Dept: PULMONOLOGY | Age: 79
End: 2024-05-08

## 2024-05-08 DIAGNOSIS — J45.20 MILD INTERMITTENT ASTHMA WITHOUT COMPLICATION: Primary | ICD-10-CM

## 2024-05-08 RX ORDER — FLUTICASONE PROPIONATE 110 UG/1
2 AEROSOL, METERED RESPIRATORY (INHALATION) 2 TIMES DAILY
Qty: 12 G | Refills: 11 | Status: SHIPPED | OUTPATIENT
Start: 2024-05-08 | End: 2025-05-08

## 2024-05-08 NOTE — TELEPHONE ENCOUNTER
I placed an order for flovent.  Last visit insurance changed it to arnuity. She tried it for 6 weeks. Likely to require a PA but she tried arnuity for >30 days and it did not help.  Needs flovent.  Inform patient that I will try to change it back to flovent    thanks

## 2024-05-09 ENCOUNTER — TELEPHONE (OUTPATIENT)
Dept: PULMONOLOGY | Age: 79
End: 2024-05-09

## 2024-05-09 NOTE — TELEPHONE ENCOUNTER
Received fax from Coverm[employer]eds needed for Fluticasone Propionate  mcg/act     KEY:  [id number]  Patient name: Brook SERRANO Adeline  : 1945       Sent to PA Pool

## 2024-05-09 NOTE — TELEPHONE ENCOUNTER
Submitted PA for FLUTICASONE  [facility] Key: [id number]  STATUS: PENDING.    Follow up done daily; if no decision with in three days we will refax.  If another three days goes by with no decision will call the insurance for status.

## 2024-05-09 NOTE — TELEPHONE ENCOUNTER
Called Gayle and let her know that her PA for flovent was approved for a year. Approval is scanned in chart.

## 2024-06-25 ENCOUNTER — TELEPHONE (OUTPATIENT)
Dept: FAMILY MEDICINE CLINIC | Age: 79
End: 2024-06-25

## 2024-06-25 NOTE — TELEPHONE ENCOUNTER
Left message on v/m to call office to schedule a chronic condition follow up appointment with Dr. SOL

## 2024-07-23 RX ORDER — PRAVASTATIN SODIUM 10 MG
10 TABLET ORAL DAILY
Qty: 90 TABLET | Refills: 3 | Status: SHIPPED | OUTPATIENT
Start: 2024-07-23

## 2024-07-23 RX ORDER — PRAVASTATIN SODIUM 20 MG
20 TABLET ORAL DAILY
Qty: 90 TABLET | Refills: 3 | OUTPATIENT
Start: 2024-07-23

## 2024-08-27 ENCOUNTER — CARE COORDINATION (OUTPATIENT)
Dept: CARE COORDINATION | Age: 79
End: 2024-08-27

## 2024-08-27 NOTE — CARE COORDINATION
Ambulatory Care Coordination Note     2024 9:14 AM     Patient Current Location:  Home: [address] [place] [id number]     ACM contacted the patient by telephone. Verified name and  with patient as identifiers.     Patient closed (patient declined) from the High Risk Care Management program on 2024. States she has strong family support at this time.  No further Ambulatory Care Manager follow up scheduled.      ACM: Helen Card RN     Challenges to be reviewed by the provider   Additional needs identified to be addressed with provider No  none                 PCP/Specialist follow up:   Future Appointments         Provider Specialty Dept Phone    10/7/2024 10:00 AM Sergio Cao DO Pulmonology [phone]            Follow Up:   No further Ambulatory Care Management follow-up scheduled at this time.

## 2024-08-28 RX ORDER — FLUTICASONE PROPIONATE 50 MCG
SPRAY, SUSPENSION (ML) NASAL
Qty: 48 G | Refills: 3 | Status: SHIPPED | OUTPATIENT
Start: 2024-08-28

## 2024-08-28 NOTE — TELEPHONE ENCOUNTER
Medication:   Requested Prescriptions     Pending Prescriptions Disp Refills    fluticasone (FLONASE) 50 MCG/ACT nasal spray [Pharmacy Med Name: FLUTICASONE PROPIONATE 50 MCG/ACT Suspension] 48 g 3     Sig: SHAKE AND USE 2 SPRAYS IN EACH NOSTRIL EVERY DAY        Last Filled:  07/13/2023    Patient Phone Number: [phone] (home)     Last appt: 7/23/2024   Next appt: 9/23/2024    Last OARRS:        No data to display

## 2024-10-07 ENCOUNTER — OFFICE VISIT (OUTPATIENT)
Dept: PULMONOLOGY | Age: 79
End: 2024-10-07
Payer: MEDICARE

## 2024-10-07 VITALS
RESPIRATION RATE: 18 BRPM | WEIGHT: 134 LBS | HEART RATE: 81 BPM | DIASTOLIC BLOOD PRESSURE: 78 MMHG | TEMPERATURE: 97.5 F | HEIGHT: 60 IN | OXYGEN SATURATION: 92 % | BODY MASS INDEX: 26.31 KG/M2 | SYSTOLIC BLOOD PRESSURE: 134 MMHG

## 2024-10-07 DIAGNOSIS — J45.40 REACTIVE AIRWAY DISEASE, MODERATE PERSISTENT, UNCOMPLICATED: Primary | ICD-10-CM

## 2024-10-07 DIAGNOSIS — J30.89 SEASONAL ALLERGIC RHINITIS DUE TO OTHER ALLERGIC TRIGGER: ICD-10-CM

## 2024-10-07 PROCEDURE — G8399 PT W/DXA RESULTS DOCUMENT: HCPCS | Performed by: INTERNAL MEDICINE

## 2024-10-07 PROCEDURE — 99213 OFFICE O/P EST LOW 20 MIN: CPT | Performed by: INTERNAL MEDICINE

## 2024-10-07 PROCEDURE — G8484 FLU IMMUNIZE NO ADMIN: HCPCS | Performed by: INTERNAL MEDICINE

## 2024-10-07 PROCEDURE — 1123F ACP DISCUSS/DSCN MKR DOCD: CPT | Performed by: INTERNAL MEDICINE

## 2024-10-07 PROCEDURE — 1036F TOBACCO NON-USER: CPT | Performed by: INTERNAL MEDICINE

## 2024-10-07 PROCEDURE — G8427 DOCREV CUR MEDS BY ELIG CLIN: HCPCS | Performed by: INTERNAL MEDICINE

## 2024-10-07 PROCEDURE — G8419 CALC BMI OUT NRM PARAM NOF/U: HCPCS | Performed by: INTERNAL MEDICINE

## 2024-10-07 PROCEDURE — 1090F PRES/ABSN URINE INCON ASSESS: CPT | Performed by: INTERNAL MEDICINE

## 2024-10-07 NOTE — PROGRESS NOTES
Chief complaint  This is a 79 y.o. year old female  who comes to see me with a chief complaint of   Chief Complaint   Patient presents with    Follow-up    Asthma       HPI  Here with cc on follow up on asthma/RADs.    Last time her insurance switched her from Flovent to Arnuity.  She could not tolerate the powder and she was approved to go back to Flovent.      She is doing well.  Went on trip to [place] and did not get SOB.  Only heat and humidity affect her        Current Outpatient Medications   Medication Sig Dispense Refill    fluticasone (FLONASE) 50 MCG/ACT nasal spray SHAKE AND USE 2 SPRAYS IN EACH NOSTRIL EVERY DAY 48 g 3    pravastatin (PRAVACHOL) 10 MG tablet Take 1 tablet by mouth daily 90 tablet 3    fluticasone (FLOVENT HFA) 110 MCG/ACT inhaler Inhale 2 puffs into the lungs 2 times daily 12 g 11    desvenlafaxine succinate (PRISTIQ) 50 MG TB24 extended release tablet Take 1 tablet by mouth daily      montelukast (SINGULAIR) 10 MG tablet Take 1 tablet by mouth nightly 90 tablet 3    donepezil (ARICEPT) 5 MG tablet TAKE 1 TABLET BY MOUTH WITH BREAKFAST. TAKE FOR 30 DAYS THEN REFILL WITH 10 MG ARICEPT      clindamycin (CLEOCIN T) 1 % external solution APPLY TO THE AFFECTED AREA TWICE DAILY AS NEEDED FOR FLARES 60 mL 3    fluticasone (FLOVENT HFA) 110 MCG/ACT inhaler INHALE 2 PUFFS BY MOUTH TWICE DAILY 36 g 3    azelastine (ASTELIN) 0.1 % nasal spray USE 2 SPRAYS IN EACH NOSTRIL TWICE DAILY 3 each 3    triamcinolone (KENALOG) 0.1 % cream APPLY TOPICALLY TO THE AFFECTED AREA TWICE DAILY AS NEEDED FOR FLARES 80 g 1    albuterol sulfate HFA (PROAIR HFA) 108 (90 Base) MCG/ACT inhaler Inhale 2 puffs into the lungs every 4 hours as needed for Wheezing or Shortness of Breath 18 g 5    acetaminophen (TYLENOL) 500 MG tablet Take 1 tablet by mouth every 4 hours as needed for Pain 360 tablet 1    pantoprazole (PROTONIX) 40 MG tablet Take 1 tablet by mouth every morning (before breakfast) 30 tablet 5    ibuprofen

## 2024-10-07 NOTE — PATIENT INSTRUCTIONS
Continue with Flovent twice a day.  Use albuterol as needed    Continue with singulair     Use nasal sprays as needed    Follow up in 6 months

## 2024-11-01 ENCOUNTER — OFFICE VISIT (OUTPATIENT)
Dept: FAMILY MEDICINE CLINIC | Age: 79
End: 2024-11-01

## 2024-11-01 VITALS
HEART RATE: 73 BPM | RESPIRATION RATE: 18 BRPM | OXYGEN SATURATION: 96 % | DIASTOLIC BLOOD PRESSURE: 68 MMHG | HEIGHT: 60 IN | BODY MASS INDEX: 26.5 KG/M2 | WEIGHT: 135 LBS | SYSTOLIC BLOOD PRESSURE: 120 MMHG

## 2024-11-01 DIAGNOSIS — D72.819 LEUKOPENIA, UNSPECIFIED TYPE: ICD-10-CM

## 2024-11-01 DIAGNOSIS — E78.5 HYPERLIPIDEMIA LDL GOAL <70: Primary | ICD-10-CM

## 2024-11-01 DIAGNOSIS — G30.1 LATE ONSET ALZHEIMER'S DEMENTIA WITHOUT BEHAVIORAL DISTURBANCE, PSYCHOTIC DISTURBANCE, MOOD DISTURBANCE, OR ANXIETY, UNSPECIFIED DEMENTIA SEVERITY (HCC): ICD-10-CM

## 2024-11-01 DIAGNOSIS — R73.03 PREDIABETES: ICD-10-CM

## 2024-11-01 DIAGNOSIS — F02.80 LATE ONSET ALZHEIMER'S DEMENTIA WITHOUT BEHAVIORAL DISTURBANCE, PSYCHOTIC DISTURBANCE, MOOD DISTURBANCE, OR ANXIETY, UNSPECIFIED DEMENTIA SEVERITY (HCC): ICD-10-CM

## 2024-11-01 RX ORDER — DONEPEZIL HYDROCHLORIDE 10 MG/1
10 TABLET, FILM COATED ORAL NIGHTLY
COMMUNITY
Start: 2024-10-18

## 2024-11-01 ASSESSMENT — PATIENT HEALTH QUESTIONNAIRE - PHQ9
1. LITTLE INTEREST OR PLEASURE IN DOING THINGS: NOT AT ALL
SUM OF ALL RESPONSES TO PHQ QUESTIONS 1-9: 0
SUM OF ALL RESPONSES TO PHQ9 QUESTIONS 1 & 2: 0
2. FEELING DOWN, DEPRESSED OR HOPELESS: NOT AT ALL
SUM OF ALL RESPONSES TO PHQ QUESTIONS 1-9: 0

## 2024-11-01 NOTE — PROGRESS NOTES
Diagnosis Orders   1. Hyperlipidemia LDL goal <70  Comprehensive Metabolic Panel    Lipid Panel      2. Late onset Alzheimer's dementia without behavioral disturbance, psychotic disturbance, mood disturbance, or anxiety, unspecified dementia severity (HCC)        3. Prediabetes  Hemoglobin A1C      4. Leukopenia, unspecified type  CBC with Auto Differential        Hyperlipidemia wise, controlled on last check, check above lab work    She will continue following closely with neurology for her Alzheimer's disease    For her mild prediabetes, check A1c    For her mild leukopenia, check CBC today    She is having some mild diarrhea in the morning, no more than once, she denies any cramping, she will try backing off on her Metamucil a bit more, it has helped by doing this a bit already, if not significantly improved over the next week to 10 days, she is to let me know    She is up-to-date with her vaccinations    Follow-up in 6 months, or sooner if needed        O: /68   Pulse 73   Resp 18   Ht 1.524 m (5')   Wt 61.2 kg (135 lb)   SpO2 96%   BMI 26.37 kg/m²   Gen- NAD, pleasant  Neck-supple, no lymphadenopathy appreciated  HEENT- Eyes without icterus or injection  Lungs- CTAB  Heart- RRR  Abdomen- Soft, non tender  Ext- No edema  Psych- Appropriate, euthymic, no si/hi  Neuro- Oriented x 3, no focal deficits appreciated    S: CC-chronic disease management    HPI-Gayle, with daughter today, presents for follow-up of hyperlipidemia, prediabetes.  She continues to follow with  for her Alzheimer's disease.  She is on donepezil and gets infusions of Leqembi.  She is currently living at [facility].  She stays cognitively and physically stimulated.  She is quite happy with how things are going.    ROS- no fever, chills, night sweats  No chest pain, dyspnea, palpitations    Patient's medications, allergies, and past medical hx were reviewed     Beth Corona DO

## 2025-01-19 DIAGNOSIS — J30.89 SEASONAL ALLERGIC RHINITIS DUE TO OTHER ALLERGIC TRIGGER: ICD-10-CM

## 2025-01-19 DIAGNOSIS — J45.40 REACTIVE AIRWAY DISEASE, MODERATE PERSISTENT, UNCOMPLICATED: ICD-10-CM

## 2025-01-20 ENCOUNTER — OFFICE VISIT (OUTPATIENT)
Age: 80
End: 2025-01-20
Payer: MEDICARE

## 2025-01-20 DIAGNOSIS — D22.9 MULTIPLE NEVI: Primary | ICD-10-CM

## 2025-01-20 DIAGNOSIS — L30.9 DERMATITIS: ICD-10-CM

## 2025-01-20 DIAGNOSIS — L82.1 SEBORRHEIC KERATOSIS: ICD-10-CM

## 2025-01-20 DIAGNOSIS — L57.0 AK (ACTINIC KERATOSIS): ICD-10-CM

## 2025-01-20 DIAGNOSIS — L73.9 FOLLICULITIS: ICD-10-CM

## 2025-01-20 PROCEDURE — G8419 CALC BMI OUT NRM PARAM NOF/U: HCPCS | Performed by: DERMATOLOGY

## 2025-01-20 PROCEDURE — G8399 PT W/DXA RESULTS DOCUMENT: HCPCS | Performed by: DERMATOLOGY

## 2025-01-20 PROCEDURE — 1090F PRES/ABSN URINE INCON ASSESS: CPT | Performed by: DERMATOLOGY

## 2025-01-20 PROCEDURE — 17000 DESTRUCT PREMALG LESION: CPT | Performed by: DERMATOLOGY

## 2025-01-20 PROCEDURE — 1160F RVW MEDS BY RX/DR IN RCRD: CPT | Performed by: DERMATOLOGY

## 2025-01-20 PROCEDURE — 1036F TOBACCO NON-USER: CPT | Performed by: DERMATOLOGY

## 2025-01-20 PROCEDURE — 1159F MED LIST DOCD IN RCRD: CPT | Performed by: DERMATOLOGY

## 2025-01-20 PROCEDURE — 99214 OFFICE O/P EST MOD 30 MIN: CPT | Performed by: DERMATOLOGY

## 2025-01-20 PROCEDURE — 1123F ACP DISCUSS/DSCN MKR DOCD: CPT | Performed by: DERMATOLOGY

## 2025-01-20 PROCEDURE — G8427 DOCREV CUR MEDS BY ELIG CLIN: HCPCS | Performed by: DERMATOLOGY

## 2025-01-20 RX ORDER — CLINDAMYCIN PHOSPHATE 11.9 MG/ML
SOLUTION TOPICAL
Qty: 60 ML | Refills: 3 | Status: SHIPPED | OUTPATIENT
Start: 2025-01-20

## 2025-01-20 RX ORDER — MONTELUKAST SODIUM 10 MG/1
10 TABLET ORAL NIGHTLY
Qty: 90 TABLET | Refills: 3 | Status: SHIPPED | OUTPATIENT
Start: 2025-01-20

## 2025-01-20 RX ORDER — FLUTICASONE PROPIONATE 110 UG/1
2 AEROSOL, METERED RESPIRATORY (INHALATION) 2 TIMES DAILY
Qty: 3 EACH | Refills: 3 | Status: SHIPPED | OUTPATIENT
Start: 2025-01-20 | End: 2026-01-20

## 2025-01-20 RX ORDER — TRIAMCINOLONE ACETONIDE 1 MG/G
CREAM TOPICAL
Qty: 80 G | Refills: 1 | Status: SHIPPED | OUTPATIENT
Start: 2025-01-20

## 2025-01-20 NOTE — PROGRESS NOTES
today except for underwear-cvoered areas    trunk and extremities with scattered brown macules and papules   L FH with erythematous roughly scaled macule    Scalp with few crusted small excoriations  R lower back with erythematous small variably crusted papules below; no vesicles          Assessment and Plan     1. Benign-appearing nevi and few SK's  - Monitor for ABCD's of MM and si/sx of NMSC  Continue sun protection - OTC sunscreen with SPF 30-50+ recommended and reviewed usage  Encouraged skin check yearly (sooner if indicated), self checks    2  AK's - L FH  - 1 lesion(s) treated with liquid nitrogen with cryac or swab.  Treated with 2 cycles for 1-5 seconds each after consent from patient.   Patient educated on risk of blister, hypopigmentation/scar and wound care.  Tolerated well.     3. Scalp Folliculitis and excoriations - chronic, few new lesions, overall stable today  - clinda lotion/solution (likes the vehicle better)  - consider oral abx if worsening    4. *Itchy back - R lower back - flaring - most c/w dermatitis or HS rxn  - ddx arthropod bites but too small and too numerous for typical bites  - ed DSC, TAC cream bid prn flares; ed se/misuse

## 2025-01-20 NOTE — PATIENT INSTRUCTIONS
Protecting Yourself From the Sun    Apply an over-the-counter broad spectrum water resistant sunscreen with an SPF of at least 30 to exposed areas of the skin. Don’t forget the ears and lips! Remember to reapply sunscreen about every 2 hours and after swimming or sweating.     Wear sun protective clothing. Swim shirts (aka. rash guards) are a great idea and negates the need to reapply sunscreen in those areas.     Seek the shade whenever possible especially between the hours of 10 am and 4 pm when the sun’s rays are the strongest.     Avoid tanning beds       Triamcinolone cream for the back as needed topically for itching and flares.  Clindamycin solution for the scalp as needed topically for flares.

## 2025-03-26 RX ORDER — CLINDAMYCIN PHOSPHATE 11.9 MG/ML
SOLUTION TOPICAL
Qty: 60 ML | Refills: 5 | OUTPATIENT
Start: 2025-03-26

## 2025-05-21 RX ORDER — CLINDAMYCIN PHOSPHATE 11.9 MG/ML
SOLUTION TOPICAL
Qty: 60 ML | Refills: 5 | Status: SHIPPED | OUTPATIENT
Start: 2025-05-21

## (undated) DEVICE — GLOVE SURG SZ 65 CRM LTX FREE POLYISOPRENE POLYMER BEAD ANTI

## (undated) DEVICE — BITE BLOCK ENDOSCP AD 60 FR W/ ADJ STRP PLAS GRN BLOX

## (undated) DEVICE — SYRINGE TB 1ML NDL 25GA L0.625IN PLAS SLIP TIP CONVENTIONAL

## (undated) DEVICE — WIPE 2218535 MEROCEL 50PK INSTR 83X83MM: Brand: MEROCEL®

## (undated) DEVICE — Device

## (undated) DEVICE — SOLUTION IRRIG 500ML STRL H2O NONPYROGENIC

## (undated) DEVICE — SYRINGE MED 30ML STD CLR PLAS LUERLOCK TIP N CTRL DISP

## (undated) DEVICE — ENDOSCOPY KIT: Brand: MEDLINE INDUSTRIES, INC.

## (undated) DEVICE — BITE BLK 60FR GRN ENDOSCP AD W STRP SLD DISPOSABLE

## (undated) DEVICE — Device: Brand: MEDEX

## (undated) DEVICE — SYRINGE MED 3ML CLR PLAS STD N CTRL LUERLOCK TIP DISP

## (undated) DEVICE — SURGICAL PROC PACK SHT WEST V4

## (undated) DEVICE — SINGLE-USE BIOPSY FORCEPS: Brand: RADIAL JAW 4

## (undated) DEVICE — SOLUTION IRRIG BSS ST 500ML

## (undated) DEVICE — 3M™ STERI-STRIP™ REINFORCED ADHESIVE SKIN CLOSURES, R1547, 1/2 IN X 4 IN (12 MM X 100 MM), 6 STRIPS/ENVELOPE: Brand: 3M™ STERI-STRIP™